# Patient Record
Sex: MALE | Race: BLACK OR AFRICAN AMERICAN | Employment: OTHER | URBAN - METROPOLITAN AREA
[De-identification: names, ages, dates, MRNs, and addresses within clinical notes are randomized per-mention and may not be internally consistent; named-entity substitution may affect disease eponyms.]

---

## 2018-04-04 ENCOUNTER — APPOINTMENT (OUTPATIENT)
Dept: CT IMAGING | Age: 63
End: 2018-04-04
Attending: EMERGENCY MEDICINE
Payer: COMMERCIAL

## 2018-04-04 ENCOUNTER — HOSPITAL ENCOUNTER (EMERGENCY)
Age: 63
Discharge: HOME | End: 2018-04-04
Attending: EMERGENCY MEDICINE
Payer: COMMERCIAL

## 2018-04-04 ENCOUNTER — APPOINTMENT (OUTPATIENT)
Dept: ULTRASOUND IMAGING | Age: 63
End: 2018-04-04
Attending: EMERGENCY MEDICINE
Payer: COMMERCIAL

## 2018-04-04 VITALS
WEIGHT: 280 LBS | RESPIRATION RATE: 18 BRPM | HEART RATE: 86 BPM | DIASTOLIC BLOOD PRESSURE: 92 MMHG | OXYGEN SATURATION: 97 % | BODY MASS INDEX: 34.1 KG/M2 | HEIGHT: 76 IN | SYSTOLIC BLOOD PRESSURE: 184 MMHG | TEMPERATURE: 99 F

## 2018-04-04 DIAGNOSIS — R10.11 ABDOMINAL PAIN, RIGHT UPPER QUADRANT: Primary | ICD-10-CM

## 2018-04-04 LAB
ALBUMIN SERPL-MCNC: 3.2 G/DL (ref 3.4–5)
ALBUMIN/GLOB SERPL: 0.8 {RATIO} (ref 0.8–1.7)
ALP SERPL-CCNC: 47 U/L (ref 45–117)
ALT SERPL-CCNC: 35 U/L (ref 16–61)
ANION GAP SERPL CALC-SCNC: 8 MMOL/L (ref 3–18)
AST SERPL-CCNC: 22 U/L (ref 15–37)
ATRIAL RATE: 84 BPM
BASOPHILS # BLD: 0 K/UL (ref 0–0.1)
BASOPHILS NFR BLD: 0 % (ref 0–2)
BILIRUB SERPL-MCNC: 0.3 MG/DL (ref 0.2–1)
BUN SERPL-MCNC: 10 MG/DL (ref 7–18)
BUN/CREAT SERPL: 10 (ref 12–20)
CALCIUM SERPL-MCNC: 9.5 MG/DL (ref 8.5–10.1)
CALCULATED P AXIS, ECG09: 47 DEGREES
CALCULATED R AXIS, ECG10: 26 DEGREES
CALCULATED T AXIS, ECG11: 70 DEGREES
CHLORIDE SERPL-SCNC: 101 MMOL/L (ref 100–108)
CK MB CFR SERPL CALC: 1.2 % (ref 0–4)
CK MB SERPL-MCNC: 4.7 NG/ML (ref 5–25)
CK SERPL-CCNC: 382 U/L (ref 39–308)
CO2 SERPL-SCNC: 29 MMOL/L (ref 21–32)
CREAT SERPL-MCNC: 0.97 MG/DL (ref 0.6–1.3)
DIAGNOSIS, 93000: NORMAL
DIFFERENTIAL METHOD BLD: ABNORMAL
EOSINOPHIL # BLD: 0.1 K/UL (ref 0–0.4)
EOSINOPHIL NFR BLD: 2 % (ref 0–5)
ERYTHROCYTE [DISTWIDTH] IN BLOOD BY AUTOMATED COUNT: 12.6 % (ref 11.6–14.5)
GLOBULIN SER CALC-MCNC: 3.8 G/DL (ref 2–4)
GLUCOSE SERPL-MCNC: 134 MG/DL (ref 74–99)
HCT VFR BLD AUTO: 35.1 % (ref 36–48)
HGB BLD-MCNC: 11.3 G/DL (ref 13–16)
LACTATE BLD-SCNC: 1.6 MMOL/L (ref 0.4–2)
LIPASE SERPL-CCNC: 388 U/L (ref 73–393)
LYMPHOCYTES # BLD: 1.8 K/UL (ref 0.9–3.6)
LYMPHOCYTES NFR BLD: 24 % (ref 21–52)
MCH RBC QN AUTO: 24.2 PG (ref 24–34)
MCHC RBC AUTO-ENTMCNC: 32.2 G/DL (ref 31–37)
MCV RBC AUTO: 75.2 FL (ref 74–97)
MONOCYTES # BLD: 0.5 K/UL (ref 0.05–1.2)
MONOCYTES NFR BLD: 6 % (ref 3–10)
NEUTS SEG # BLD: 5.3 K/UL (ref 1.8–8)
NEUTS SEG NFR BLD: 68 % (ref 40–73)
P-R INTERVAL, ECG05: 164 MS
PLATELET # BLD AUTO: 265 K/UL (ref 135–420)
PMV BLD AUTO: 11.2 FL (ref 9.2–11.8)
POTASSIUM SERPL-SCNC: 3.6 MMOL/L (ref 3.5–5.5)
PROT SERPL-MCNC: 7 G/DL (ref 6.4–8.2)
Q-T INTERVAL, ECG07: 390 MS
QRS DURATION, ECG06: 92 MS
QTC CALCULATION (BEZET), ECG08: 460 MS
RBC # BLD AUTO: 4.67 M/UL (ref 4.7–5.5)
SODIUM SERPL-SCNC: 138 MMOL/L (ref 136–145)
TROPONIN I SERPL-MCNC: <0.02 NG/ML (ref 0–0.04)
VENTRICULAR RATE, ECG03: 84 BPM
WBC # BLD AUTO: 7.7 K/UL (ref 4.6–13.2)

## 2018-04-04 PROCEDURE — 83605 ASSAY OF LACTIC ACID: CPT

## 2018-04-04 PROCEDURE — 99283 EMERGENCY DEPT VISIT LOW MDM: CPT

## 2018-04-04 PROCEDURE — 93005 ELECTROCARDIOGRAM TRACING: CPT

## 2018-04-04 PROCEDURE — 83690 ASSAY OF LIPASE: CPT | Performed by: EMERGENCY MEDICINE

## 2018-04-04 PROCEDURE — 74177 CT ABD & PELVIS W/CONTRAST: CPT

## 2018-04-04 PROCEDURE — 96374 THER/PROPH/DIAG INJ IV PUSH: CPT

## 2018-04-04 PROCEDURE — 74011250636 HC RX REV CODE- 250/636: Performed by: EMERGENCY MEDICINE

## 2018-04-04 PROCEDURE — 85025 COMPLETE CBC W/AUTO DIFF WBC: CPT | Performed by: EMERGENCY MEDICINE

## 2018-04-04 PROCEDURE — 82550 ASSAY OF CK (CPK): CPT | Performed by: EMERGENCY MEDICINE

## 2018-04-04 PROCEDURE — 76705 ECHO EXAM OF ABDOMEN: CPT

## 2018-04-04 PROCEDURE — 74011636320 HC RX REV CODE- 636/320: Performed by: EMERGENCY MEDICINE

## 2018-04-04 PROCEDURE — 80053 COMPREHEN METABOLIC PANEL: CPT | Performed by: EMERGENCY MEDICINE

## 2018-04-04 RX ORDER — FENTANYL CITRATE 50 UG/ML
50 INJECTION, SOLUTION INTRAMUSCULAR; INTRAVENOUS
Status: COMPLETED | OUTPATIENT
Start: 2018-04-04 | End: 2018-04-04

## 2018-04-04 RX ORDER — OXYCODONE AND ACETAMINOPHEN 5; 325 MG/1; MG/1
1 TABLET ORAL
Qty: 10 TAB | Refills: 0 | Status: SHIPPED | OUTPATIENT
Start: 2018-04-04 | End: 2018-05-11 | Stop reason: ALTCHOICE

## 2018-04-04 RX ADMIN — FENTANYL CITRATE 50 MCG: 50 INJECTION INTRAMUSCULAR; INTRAVENOUS at 14:54

## 2018-04-04 RX ADMIN — IOPAMIDOL 97 ML: 612 INJECTION, SOLUTION INTRAVENOUS at 16:46

## 2018-04-04 NOTE — ED NOTES
Patient given d/c papers. He had no questions @ this car. He and wife ambulated out of room to lobby.

## 2018-04-04 NOTE — ED PROVIDER NOTES
EMERGENCY DEPARTMENT HISTORY AND PHYSICAL EXAM    2:37 PM      Date: 4/4/2018  Patient Name: Elizabeth Orlando History of Presenting Illness     No chief complaint on file. History Provided By: Patient    Additional History (Context): Elizabeth Orlando is a 65 y.o. male with diabetes, hypertension and obesity who presents to the ED with a chief complaint of acute abdominal pain for the past 12 hours with no other associated symptoms. Patient rates pain 9/10. Patient states he has a history of pancreatitis and has a slight concern for a pancreatitis flare up at this time. Patient reports his abdominal pain is more localized to the RUQ. Patient also reports his abdominal pain is precipitated with intake of food. Patient does not state any other modifying factors. Patient denies fever or any other symptoms or complaitns. PCP: PROVIDER UNKNOWN    Chief Complaint: Abdominal pain  Duration: 12 Hours  Timing:  Acute  Location: Abdomen   Quality: N/A  Severity: 9 out of 10  Modifying Factors: Abdominal pain precipitated with intake of food. Associated Symptoms: denies any other associated signs or symptoms      Current Facility-Administered Medications   Medication Dose Route Frequency Provider Last Rate Last Dose    fentaNYL citrate (PF) injection 50 mcg  50 mcg IntraVENous NOW Reilly Tanner MD         Current Outpatient Prescriptions   Medication Sig Dispense Refill    HYDROcodone-acetaminophen (NORCO) 5-325 mg per tablet Take 1 Tab by mouth every six (6) hours as needed for Pain for up to 12 doses. Max Daily Amount: 4 Tabs. 12 Tab 0    insulin glargine (LANTUS SOLOSTAR) 100 unit/mL (3 mL) pen 42 units BID SQ 3 Package 3    metFORMIN (GLUCOPHAGE) 1,000 mg tablet TAKE ONE TABLET BY MOUTH TWICE DAILY WITH MEALS 180 Tab 3    valsartan (DIOVAN) 160 mg tablet Take 1 Tab by mouth daily. 90 Tab 3    LEVITRA 20 mg tablet TAKE ONE TABLET BY MOUTH AS NEEDED 10 Tab 4    aspirin 81 mg Tab Take  by mouth. Past History     Past Medical History:  Past Medical History:   Diagnosis Date    DM (diabetes mellitus) ([facility] [address] 3/15/2010    Elevated cholesterol with high triglycerides 3/15/2010    Elevated PSA 3/15/2010    HTN (hypertension), benign 3/15/2010    Obesity 3/15/2010    Prostate cancer ([facility] 5/3/2011       Past Surgical History:  No past surgical history on file. Family History:  No family history on file. Social History:  Social History   Substance Use Topics    Smoking status: Former Smoker     Quit date: 1/1/1995    Smokeless tobacco: Never Used    Alcohol use No       Allergies: Allergies   Allergen Reactions    Byetta [Exenatide] Other (comments)     Pancreatits         Review of Systems     Review of Systems   Constitutional: Negative for fever. Gastrointestinal: Positive for abdominal pain. All other systems reviewed and are negative. Physical Exam     Visit Vitals    BP (!) 184/92 (BP 1 Location: Left arm, BP Patient Position: Sitting)    Pulse 86    Temp 99 °F (37.2 °C)    Resp 18    Ht 6' 4\" (1.93 m)    Wt 127 kg (280 lb)    SpO2 97%    BMI 34.08 kg/m2       Physical Exam   Constitutional: He is oriented to person, place, and time. He appears well-developed. HENT:   Head: Normocephalic and atraumatic. Eyes: EOM are normal. Pupils are equal, round, and reactive to light. Neck: Normal range of motion. Neck supple. Cardiovascular: Normal rate, regular rhythm and normal heart sounds. Exam reveals no friction rub. No murmur heard. Pulmonary/Chest: Effort normal and breath sounds normal. No respiratory distress. He has no wheezes. Abdominal: Soft. He exhibits no distension. There is tenderness. There is no rebound and no guarding. TTP ruq     Musculoskeletal: Normal range of motion. Neurological: He is alert and oriented to person, place, and time. Skin: Skin is warm and dry. Psychiatric: He has a normal mood and affect.  His behavior is normal. Thought content normal.       Diagnostic Study Results   ekg nsr at 84. Nl axis. Nl int. No lisseth/d. No hypertrophy ; no prior. Medical Decision Making     1. One day RUQ tenderness. Similar x 1 that was pancreatitis.  + nasuea no vomiting, is worsened by food; check labs; RUQ US ? Stone if lipase up. Labs wnl; ct noted; d/w pt will tx sx. Diagnosis     No diagnosis found. _______________________________    Attestations:  Scribe Attestation     Juliana Loza acting as a scribe for and in the presence of Adrian Ross MD      April 04, 2018 at 2:39 PM       Provider Attestation:      I personally performed the services described in the documentation, reviewed the documentation, as recorded by the scribe in my presence, and it accurately and completely records my words and actions.  April 04, 2018 at 2:39 PM - Adrian Ross MD    _______________________________

## 2018-04-04 NOTE — DISCHARGE INSTRUCTIONS
Abdominal Pain: Care Instructions  Your Care Instructions    Abdominal pain has many possible causes. Some aren't serious and get better on their own in a few days. Others need more testing and treatment. If your pain continues or gets worse, you need to be rechecked and may need more tests to find out what is wrong. You may need surgery to correct the problem. Don't ignore new symptoms, such as fever, nausea and vomiting, urination problems, pain that gets worse, and dizziness. These may be signs of a more serious problem. Your doctor may have recommended a follow-up visit in the next 8 to 12 hours. If you are not getting better, you may need more tests or treatment. The doctor has checked you carefully, but problems can develop later. If you notice any problems or new symptoms, get medical treatment right away. Follow-up care is a key part of your treatment and safety. Be sure to make and go to all appointments, and call your doctor if you are having problems. It's also a good idea to know your test results and keep a list of the medicines you take. How can you care for yourself at home? · Rest until you feel better. · To prevent dehydration, drink plenty of fluids, enough so that your urine is light yellow or clear like water. Choose water and other caffeine-free clear liquids until you feel better. If you have kidney, heart, or liver disease and have to limit fluids, talk with your doctor before you increase the amount of fluids you drink. · If your stomach is upset, eat mild foods, such as rice, dry toast or crackers, bananas, and applesauce. Try eating several small meals instead of two or three large ones. · Wait until 48 hours after all symptoms have gone away before you have spicy foods, alcohol, and drinks that contain caffeine. · Do not eat foods that are high in fat. · Avoid anti-inflammatory medicines such as aspirin, ibuprofen (Advil, Motrin), and naproxen (Aleve).  These can cause stomach upset. Talk to your doctor if you take daily aspirin for another health problem. When should you call for help? Call 911 anytime you think you may need emergency care. For example, call if:  ? · You passed out (lost consciousness). ? · You pass maroon or very bloody stools. ? · You vomit blood or what looks like coffee grounds. ? · You have new, severe belly pain. ?Call your doctor now or seek immediate medical care if:  ? · Your pain gets worse, especially if it becomes focused in one area of your belly. ? · You have a new or higher fever. ? · Your stools are black and look like tar, or they have streaks of blood. ? · You have unexpected vaginal bleeding. ? · You have symptoms of a urinary tract infection. These may include:  ¨ Pain when you urinate. ¨ Urinating more often than usual.  ¨ Blood in your urine. ? · You are dizzy or lightheaded, or you feel like you may faint. ? Watch closely for changes in your health, and be sure to contact your doctor if:  ? · You are not getting better after 1 day (24 hours). Where can you learn more? Go to http://hunter-martita.info/. Enter D433 in the search box to learn more about \"Abdominal Pain: Care Instructions. \"  Current as of: March 20, 2017  Content Version: 11.4  © 9661-7041 [employer]. Care instructions adapted under license by [employer] (which disclaims liability or warranty for this information). If you have questions about a medical condition or this instruction, always ask your healthcare professional. [place] [address] any warranty or liability for your use of this information.

## 2018-04-04 NOTE — ED TRIAGE NOTES
Pt. States \"I'm having severe stomach pains\" reports symptoms started \"3 o'clock this morning\". Denies any N/V/D.

## 2018-04-27 ENCOUNTER — OFFICE VISIT (OUTPATIENT)
Dept: FAMILY MEDICINE CLINIC | Age: 63
End: 2018-04-27

## 2018-04-27 VITALS
BODY MASS INDEX: 35.39 KG/M2 | HEIGHT: 76 IN | DIASTOLIC BLOOD PRESSURE: 88 MMHG | RESPIRATION RATE: 12 BRPM | OXYGEN SATURATION: 98 % | HEART RATE: 85 BPM | SYSTOLIC BLOOD PRESSURE: 148 MMHG | TEMPERATURE: 97.3 F | WEIGHT: 290.6 LBS

## 2018-04-27 DIAGNOSIS — Z53.21 PATIENT LEFT WITHOUT BEING SEEN: Primary | ICD-10-CM

## 2018-04-27 RX ORDER — INSULIN ASPART 100 [IU]/ML
5 INJECTION, SOLUTION INTRAVENOUS; SUBCUTANEOUS 2 TIMES DAILY
COMMUNITY
End: 2018-05-24 | Stop reason: ALTCHOICE

## 2018-04-27 RX ORDER — SILDENAFIL 50 MG/1
50 TABLET, FILM COATED ORAL
COMMUNITY
End: 2022-08-23 | Stop reason: ALTCHOICE

## 2018-04-27 RX ORDER — MELATONIN
2000
COMMUNITY
End: 2018-05-24 | Stop reason: ALTCHOICE

## 2018-04-27 RX ORDER — TRAMADOL HYDROCHLORIDE 50 MG/1
100 TABLET ORAL
COMMUNITY
End: 2018-10-29

## 2018-04-27 NOTE — PROGRESS NOTES
Patient came into the office today to establish care. Patient stated he has been a [facility] patient updated care team and verified pharmacy. Records release signed for the [facility]. 1. Have you been to the ER, urgent care clinic since your last visit? Hospitalized since your last visit? Yes When: 04/04/18 and 04/10/18 and 04/15/18 Where: [facility] [employer]  Reason for visit: Pancreatitis, abdominal pain, Flu virus    2. Have you seen or consulted any other health care providers outside of the [facility] since your last visit? Include any pap smears or colon screening.  No

## 2018-05-11 ENCOUNTER — OFFICE VISIT (OUTPATIENT)
Dept: FAMILY MEDICINE CLINIC | Age: 63
End: 2018-05-11

## 2018-05-11 VITALS
OXYGEN SATURATION: 98 % | DIASTOLIC BLOOD PRESSURE: 86 MMHG | RESPIRATION RATE: 12 BRPM | HEART RATE: 82 BPM | BODY MASS INDEX: 35.22 KG/M2 | HEIGHT: 76 IN | TEMPERATURE: 97.8 F | SYSTOLIC BLOOD PRESSURE: 148 MMHG | WEIGHT: 289.2 LBS

## 2018-05-11 DIAGNOSIS — Z79.4 TYPE 2 DIABETES MELLITUS WITH DIABETIC POLYNEUROPATHY, WITH LONG-TERM CURRENT USE OF INSULIN (HCC): Primary | ICD-10-CM

## 2018-05-11 DIAGNOSIS — E66.9 OBESITY WITH SERIOUS COMORBIDITY, UNSPECIFIED CLASSIFICATION, UNSPECIFIED OBESITY TYPE: ICD-10-CM

## 2018-05-11 DIAGNOSIS — E11.42 TYPE 2 DIABETES MELLITUS WITH DIABETIC POLYNEUROPATHY, WITH LONG-TERM CURRENT USE OF INSULIN (HCC): Primary | ICD-10-CM

## 2018-05-11 DIAGNOSIS — Z51.81 ENCOUNTER FOR MEDICATION MONITORING: ICD-10-CM

## 2018-05-11 DIAGNOSIS — Z87.19 HX OF PANCREATITIS: ICD-10-CM

## 2018-05-11 DIAGNOSIS — I10 ESSENTIAL HYPERTENSION: ICD-10-CM

## 2018-05-11 DIAGNOSIS — E55.9 VITAMIN D DEFICIENCY: ICD-10-CM

## 2018-05-11 PROBLEM — Z99.89 OSA ON CPAP: Status: ACTIVE | Noted: 2018-05-11

## 2018-05-11 PROBLEM — G47.33 OSA ON CPAP: Status: ACTIVE | Noted: 2018-05-11

## 2018-05-11 RX ORDER — VALSARTAN 80 MG/1
80 TABLET ORAL DAILY
Qty: 90 TAB | Refills: 4 | Status: SHIPPED | OUTPATIENT
Start: 2018-05-11 | End: 2018-11-16

## 2018-05-11 RX ORDER — VALSARTAN 160 MG/1
40 TABLET ORAL 2 TIMES DAILY
Status: CANCELLED | OUTPATIENT
Start: 2018-05-11

## 2018-05-11 NOTE — PROGRESS NOTES
Chelsey Kumar is a 65 y.o. male  New to me, fractured care in recent years, largely through the [place]    Admitted [facility] x5 days for pancreatitis. Discharged 4/10/2018. No clear etiology. Feels well    Pancreatitis 3 years ago attributed to Byetta, which had recently been initiated. DM2: with neuropathy  No retinopathy  Foot exam 1 month ago at the [facility] for some sort of hemorrhage post discharge not related to DM2. Short acting insulin had been 10 units twice daily before hospitalization  Lantus had been 48 units daily      Home glucoses:  Fastin-157  PP lunch: 200-210, only checks when sluggish/tired - once q few weeks  PP dinner: NA  Hypoglycemic episodes: perhaps 1.5 months ago with higher short acting insulin    Hypertension: Valsartan halved during hospitalization to 80 mg daily, ran out last week      Had been on atorvastatin at one point \"I didn't like the way it made my body feel. \"    history of vit d def used to be on high dose replacement    Prostate cancer  with seed implant. Lost to follow up. Patient Care Team:  Wali Jefferson MD as PCP - General (Family Practice)  Krystal Vigil NP as Physician (Nurse Practitioner)  Allergies   Allergen Reactions    Byetta [Exenatide] Other (comments)     Pancreatits     Outpatient Prescriptions Marked as Taking for the 18 encounter (Office Visit) with Wali Jefferson MD   Medication Sig Dispense Refill    cholecalciferol (VITAMIN D3) 1,000 unit tablet 2,000 Units.  insulin aspart U-100 (NOVOLOG) 100 unit/mL injection 5 Units by SubCUTAneous route two (2) times a day.  sildenafil citrate (VIAGRA) 50 mg tablet 50 mg.      insulin glargine (LANTUS SOLOSTAR) 100 unit/mL (3 mL) pen 42 units BID SQ (Patient taking differently: 22 Units.  20 units BID SQ) 3 Package 3    metFORMIN (GLUCOPHAGE) 1,000 mg tablet TAKE ONE TABLET BY MOUTH TWICE DAILY WITH MEALS 180 Tab 3    valsartan (DIOVAN) 160 mg tablet Take 1 Tab by mouth daily. (Patient taking differently: Take 40 mg by mouth two (2) times a day.) 90 Tab 3    LEVITRA 20 mg tablet TAKE ONE TABLET BY MOUTH AS NEEDED 10 Tab 4    aspirin 81 mg Tab Take  by mouth. Patient Active Problem List    Diagnosis    Microalbuminuria    Prostate cancer ([facility] [address]    DM (diabetes mellitus) ([facility] [address]    Essential hypertension    Elevated cholesterol with high triglycerides    Obesity    Elevated PSA     Past Medical History:   Diagnosis Date    DM (diabetes mellitus) ([facility] [address] 3/15/2010    Elevated cholesterol with high triglycerides 3/15/2010    Elevated PSA 3/15/2010    HTN (hypertension), benign 3/15/2010    Obesity 3/15/2010    Prostate cancer ([facility] [address] 5/3/2011     No past surgical history on file. No family history on file. Social History     Social History    Marital status:      Spouse name: N/A    Number of children: N/A    Years of education: N/A     Occupational History    [address]     law enforcement [employer]     Social History Main Topics    Smoking status: Former Smoker     Quit date: 1/1/1995    Smokeless tobacco: Never Used    Alcohol use No    Drug use: No    Sexual activity: Yes     Partners: Female     Other Topics Concern    Not on file     Social History Narrative            Review of Systems   Constitutional: Negative for fever and malaise/fatigue. Respiratory: Negative for cough, hemoptysis and shortness of breath. Cardiovascular: Negative for chest pain and leg swelling. Gastrointestinal: Negative for abdominal pain, blood in stool, melena, nausea and vomiting. Genitourinary: Negative for hematuria. Neurological: Negative for sensory change, speech change, focal weakness and headaches.        Visit Vitals    /86 (BP 1 Location: Left arm, BP Patient Position: Sitting)    Pulse 82    Temp 97.8 °F (36.6 °C) (Oral)    Resp 12    Ht 6' 4\" (1.93 m)    Wt 289 lb 3.2 oz (131.2 kg)    SpO2 98%    BMI 35.2 kg/m2      Physical Exam   Constitutional: He is oriented to person, place, and time. He appears well-developed and well-nourished. No distress. Pleasant obese  Male   HENT:   Head: Normocephalic and atraumatic. Right Ear: External ear normal.   Left Ear: External ear normal.   Mouth/Throat: Oropharynx is clear and moist.   Eyes: Conjunctivae are normal. No scleral icterus. Neck: Normal range of motion. Neck supple. No thyromegaly present. Cardiovascular: Normal rate, regular rhythm, normal heart sounds and intact distal pulses. Exam reveals no gallop and no friction rub. No murmur heard. Pulmonary/Chest: Effort normal and breath sounds normal. No respiratory distress. He has no wheezes. He has no rales. Abdominal: Soft. Bowel sounds are normal. He exhibits no distension and no mass. There is no hepatosplenomegaly. There is no tenderness. Musculoskeletal: Normal range of motion. He exhibits no edema. Lymphadenopathy:     He has no cervical adenopathy. Neurological: He is alert and oriented to person, place, and time. Skin: Skin is warm and dry. Psychiatric: He has a normal mood and affect. His behavior is normal. Judgment and thought content normal.   Nursing note and vitals reviewed. Lab Results   Component Value Date/Time    Sodium 138 04/04/2018 02:56 PM    Potassium 3.6 04/04/2018 02:56 PM    Chloride 101 04/04/2018 02:56 PM    CO2 29 04/04/2018 02:56 PM    Anion gap 8 04/04/2018 02:56 PM    Glucose 134 (H) 04/04/2018 02:56 PM    BUN 10 04/04/2018 02:56 PM    Creatinine 0.97 04/04/2018 02:56 PM    BUN/Creatinine ratio 10 (L) 04/04/2018 02:56 PM    GFR est AA >60 04/04/2018 02:56 PM    GFR est non-AA >60 04/04/2018 02:56 PM    Calcium 9.5 04/04/2018 02:56 PM    Bilirubin, total 0.3 04/04/2018 02:56 PM    AST (SGOT) 22 04/04/2018 02:56 PM    Alk.  phosphatase 47 04/04/2018 02:56 PM    Protein, total 7.0 04/04/2018 02:56 PM    Albumin 3.2 (L) 04/04/2018 02:56 PM    Globulin 3.8 04/04/2018 02:56 PM    A-G Ratio 0.8 04/04/2018 02:56 PM    ALT (SGPT) 35 04/04/2018 02:56 PM       Lab Results   Component Value Date/Time    Hemoglobin A1c 11.0 (H) 04/26/2012 11:01 AM    Hemoglobin A1c 7.0 (H) 09/14/2011 08:22 AM    Hemoglobin A1c 8.6 (H) 05/03/2011 03:24 PM    Hemoglobin A1c 11.1 (H) 02/17/2011 08:46 AM    Glucose 134 (H) 04/04/2018 02:56 PM    Glucose (POC) 293 (H) 09/07/2010 08:32 AM    Glucose,  (H) 03/04/2010 10:23 AM    Microalb/Creat ratio (ug/mg creat.) 356.9 (H) 07/02/2013 09:30 AM    LDL, calculated Comment 01/02/2014 09:02 AM    Creatinine 0.97 04/04/2018 02:56 PM           ICD-10-CM ICD-9-CM    1. Type 2 diabetes mellitus with diabetic polyneuropathy, with long-term current use of insulin (HCC) E11.42 250.60 HEMOGLOBIN A1C WITH EAG    Z79.4 357.2 LIPID PANEL W/ REFLX DIRECT LDL     V58.67 MICROALBUMIN, UR, RAND W/ MICROALB/CREAT RATIO      MICROALBUMIN, UR, RAND W/ MICROALB/CREAT RATIO      LIPID PANEL W/ REFLX DIRECT LDL      HEMOGLOBIN A1C WITH EAG   2. Obesity with serious comorbidity, unspecified classification, unspecified obesity type E66.9 278.00    3. Encounter for medication monitoring Z51.81 V58.83 MAGNESIUM      VITAMIN B12 & FOLATE      VITAMIN B12 & FOLATE      MAGNESIUM   4. Hx of pancreatitis Z87.19 V12.79    5. Essential hypertension I10 401.9 valsartan (DIOVAN) 80 mg tablet   6. Vitamin D deficiency E55.9 268.9 VITAMIN D, 25 HYDROXY      VITAMIN D, 25 HYDROXY     DM 2: Obtaining records. Labs today. Continue current medications unchanged  History of pancreatitis x 2: Clinically resolved. Obtaining records from discharge summary specifically looking for GI consultation notes/plan    Reports prior statin intolerance. Anticipating finding of uncontrolled vitamin D deficiency. We will replace and then retrial statin    Hypertension: Uncontrolled with labs medication this past week. Resuming lower dose valsartan.     Unless otherwise stated, conditions above are stable or improved and well controlled, with a plan to continue present management. The patient understands our medical plan. Alternatives have been explained and offered. The risks, benefits and significant side effects of all medications have been reviewed. Anticipated time course and progression of condition reviewed. All questions have been addressed. He is encouraged to employ the information provided in the after visit summary, which was reviewed. Follow-up Disposition:  Return in about 2 weeks (around 5/25/2018) for diabetes follow up, discharge summary review, (30). Dragon medical dictation software was used for portions of this report. Unintended voice recognition errors may occur.

## 2018-05-11 NOTE — PROGRESS NOTES
Chief Complaint   Patient presents with    Establish Care       Pt preferred language for health care discussion is english. Is someone accompanying this pt? no    Is the patient using any DME equipment during OV? no    Depression Screening:  PHQ over the last two weeks 5/11/2018 4/27/2018   Little interest or pleasure in doing things Not at all Not at all   Feeling down, depressed or hopeless Not at all Not at all   Total Score PHQ 2 0 0       Learning Assessment:  Learning Assessment 5/11/2018 1/2/2014   PRIMARY LEARNER Patient Patient   HIGHEST LEVEL OF EDUCATION - PRIMARY LEARNER  > 4 YEARS OF COLLEGE 4 YEARS OF COLLEGE   BARRIERS PRIMARY LEARNER NONE NONE   CO-LEARNER CAREGIVER - No   PRIMARY LANGUAGE ENGLISH ENGLISH   LEARNER PREFERENCE PRIMARY LISTENING OTHER (COMMENT)   ANSWERED BY patient  patient   RELATIONSHIP SELF SELF         Health Maintenance reviewed and discussed per provider. Yes    Pt is due for   Health Maintenance Due   Topic Date Due    Hepatitis C Screening  1955    FOOT EXAM Q1  11/25/1966    Pneumococcal 19-64 Highest Risk (1 of 3 - PCV13) 11/25/1975    DTaP/Tdap/Td series (1 - Tdap) 11/25/1977    FOBT Q 1 YEAR AGE 50-75  11/25/2006    EYE EXAM RETINAL OR DILATED Q1  12/02/2013    HEMOGLOBIN A1C Q6M  07/02/2014    MICROALBUMIN Q1  07/02/2014    LIPID PANEL Q1  01/02/2015    ZOSTER VACCINE AGE 60>  09/25/2016   . Please order/place referral if appropriate. Advance Directive:  1. Do you have an advance directive in place? Patient Reply:no    2. If not, would you like material regarding how to put one in place? Patient Reply: no        Coordination of Care:  1. Have you been to the ER, urgent care clinic since your last visit? Hospitalized since your last visit? Hospitalized Yes When: 04/04/18 and 04/10/18 and 04/15/18 Where: [facility]  Reason for visit: Pancreatitis, abdominal pain, Flu virus    2.  Have you seen or consulted any other health care providers outside of the [address] [place] [address] since your last visit? Include any pap smears or colon screening. no    Pharmacy verified  Care Team verified and updated. Please see Red banners under Allergies, Med rec, Immunizations to remove outside inquires. All correct information has been verified with patient and added to chart.

## 2018-05-11 NOTE — MR AVS SNAPSHOT
[facility] [address] [address] [place] [address] 
[phone] Patient: Jai Garay. MRN: [id number] :1955 Visit Information Date & Time Provider Department Dept. Phone Encounter #  
 2018  2:00 PM Alyson Lee MD [facility] [id number] Follow-up Instructions Return in about 2 weeks (around 2018) for diabetes follow up, discharge summary review, (30). Upcoming Health Maintenance Date Due Hepatitis C Screening 1955 FOOT EXAM Q1 1966 Pneumococcal 19-64 Highest Risk (1 of 3 - PCV13) 1975 DTaP/Tdap/Td series (1 - Tdap) 1977 FOBT Q 1 YEAR AGE 50-75 2006 EYE EXAM RETINAL OR DILATED Q1 2013 HEMOGLOBIN A1C Q6M 2014 MICROALBUMIN Q1 2014 LIPID PANEL Q1 2015 ZOSTER VACCINE AGE 60> 2016 Influenza Age 5 to Adult 2018 Allergies as of 2018  Review Complete On: 2018 By: Alyson Lee MD  
  
 Severity Noted Reaction Type Reactions Byetta [Exenatide] High 2014    Other (comments) Pancreatits Current Immunizations  Never Reviewed No immunizations on file. Not reviewed this visit You Were Diagnosed With   
  
 Codes Comments Type 2 diabetes mellitus with diabetic polyneuropathy, with long-term current use of insulin (HCC)    -  Primary ICD-10-CM: E11.42, Z79.4 ICD-9-CM: 250.60, 357.2, V58.67 Obesity with serious comorbidity, unspecified classification, unspecified obesity type     ICD-10-CM: E66.9 ICD-9-CM: 278.00 Encounter for medication monitoring     ICD-10-CM: Z51.81 
ICD-9-CM: V58.83 Hx of pancreatitis     ICD-10-CM: Z87.19 ICD-9-CM: V12.79 Essential hypertension     ICD-10-CM: I10 
ICD-9-CM: 401.9 Vitamin D deficiency     ICD-10-CM: E55.9 ICD-9-CM: 268.9 Vitals BP Pulse Temp Resp Height(growth percentile) Weight(growth percentile) 148/86 (BP 1 Location: Left arm, BP Patient Position: Sitting) 82 97.8 °F (36.6 °C) (Oral) 12 6' 4\" (1.93 m) 289 lb 3.2 oz (131.2 kg) SpO2 BMI Smoking Status 98% 35.2 kg/m2 Former Smoker BMI and BSA Data Body Mass Index Body Surface Area  
 35.2 kg/m 2 2.65 m 2 Preferred Pharmacy Pharmacy Name Phone [address] [place] [address] [phone] Your Updated Medication List  
  
   
This list is accurate as of 5/11/18  2:58 PM.  Always use your most recent med list.  
  
  
  
  
 aspirin 81 mg tablet Take  by mouth. cholecalciferol 1,000 unit tablet Commonly known as:  VITAMIN D3  
2,000 Units. insulin aspart U-100 100 unit/mL injection Commonly known as:  NOVOLOG  
5 Units by SubCUTAneous route two (2) times a day. insulin glargine 100 unit/mL (3 mL) Inpn Commonly known as:  LANTUS SOLOSTAR U-100 INSULIN  
42 units BID SQ  
  
 LEVITRA 20 mg tablet Generic drug:  vardenafil TAKE ONE TABLET BY MOUTH AS NEEDED  
  
 metFORMIN 1,000 mg tablet Commonly known as:  GLUCOPHAGE  
TAKE ONE TABLET BY MOUTH TWICE DAILY WITH MEALS  
  
 sildenafil citrate 50 mg tablet Commonly known as:  VIAGRA 50 mg.  
  
 traMADol 50 mg tablet Commonly known as:  ULTRAM  
100 mg.  
  
 valsartan 80 mg tablet Commonly known as:  DIOVAN Take 1 Tab by mouth daily. Prescriptions Sent to Pharmacy Refills  
 valsartan (DIOVAN) 80 mg tablet 4 Sig: Take 1 Tab by mouth daily. Class: Normal  
 Pharmacy: [address] [place] [address] Ph #: [phone] Route: Oral  
  
Follow-up Instructions Return in about 2 weeks (around 5/25/2018) for diabetes follow up, discharge summary review, (30). To-Do List   
 05/11/2018 Lab:  HEMOGLOBIN A1C WITH EAG   
  
 05/11/2018 Lab: LIPID PANEL W/ REFLX DIRECT LDL   
  
 05/11/2018 Lab:  MAGNESIUM   
  
 05/11/2018 Lab:  MICROALBUMIN, UR, RAND W/ MICROALB/CREAT RATIO Around 05/11/2018 Lab:  VITAMIN B12 & FOLATE   
  
 05/11/2018 Lab:  VITAMIN D, 25 HYDROXY Patient Instructions Please call the clinic if you haven't received a call with the results of your tests or with an appointment plan for any referrals/studies within one week. No news is not good news; it's no news. Please review the list of your current medications (name, formulation, strength and dosing instructions) and allergies and call us if there is an error. It is good practice to bring all of your prescriptions, over the counter medications and herbal supplements to each visit. Learn what conditions your medications are treating. Know which doctor is responsible for managing which condition. If you are needing of more medication, contact the office of the doctor managing that condition. It is my practice to ensure that any prescription generated has a large enough supply with enough refills to last you through and beyond the time I am next expecting to see you. If your medications are running low, contact your pharmacy for a refill. If there are no refills remaining, that means it is time for you to see me to reassess the status of your condition and whether the medication is still appropriate. Introducing [facility] & HEALTH SERVICES! [facility] introduces [employer] patient portal. Now you can access parts of your medical record, email your doctor's office, and request medication refills online. 1. In your internet browser, go to [email or web address] 2. Click on the First Time User? Click Here link in the Sign In box. You will see the New Member Sign Up page. 3. Enter your [employer] Access Code exactly as it appears below.  You will not need to use this code after youve completed the sign-up process. If you do not sign up before the expiration date, you must request a new code. · [employer] Access Code: [email or web address] Expires: 7/3/2018  1:51 PM 
 
4. Enter the last four digits of your Social Security Number (xxxx) and Date of Birth (mm/dd/yyyy) as indicated and click Submit. You will be taken to the next sign-up page. 5. Create a [employer] ID. This will be your [employer] login ID and cannot be changed, so think of one that is secure and easy to remember. 6. Create a [employer] password. You can change your password at any time. 7. Enter your Password Reset Question and Answer. This can be used at a later time if you forget your password. 8. Enter your e-mail address. You will receive e-mail notification when new information is available in [address] 9. Click Sign Up. You can now view and download portions of your medical record. 10. Click the Download Summary menu link to download a portable copy of your medical information. If you have questions, please visit the Frequently Asked Questions section of the [employer] website. Remember, [employer] is NOT to be used for urgent needs. For medical emergencies, dial 911. Now available from your iPhone and Android! Please provide this summary of care documentation to your next provider. Your primary care clinician is listed as Cait Giles. If you have any questions after today's visit, please call [phone].

## 2018-05-11 NOTE — Clinical Note
Raul Bauer recommend setting a tickler to request [facility] again in a week if you haven't seen them at that point.

## 2018-05-12 LAB
25(OH)D3 SERPL-MCNC: 19.6 NG/ML (ref 32–100)
AVG GLU, 10930: 177 MG/DL (ref 91–123)
CHOLEST SERPL-MCNC: 229 MG/DL (ref 110–200)
CREATININE, URINE: 85 MG/DL
FOLATE,FOL: 7.45 NG/ML
HBA1C MFR BLD HPLC: 7.8 % (ref 4.8–5.9)
HDLC SERPL-MCNC: 26 MG/DL (ref 40–59)
HDLC SERPL-MCNC: 8.8 MG/DL (ref 0–5)
LDLC SERPL CALC-MCNC: 125 MG/DL (ref 50–99)
MAGNESIUM SERPL-MCNC: 1.3 MG/DL (ref 1.6–2.5)
MICROALB/CREAT RATIO, 140286: 1193.2 MCG/MG OF CREATININE (ref 0–30)
MICROALBUMIN,URINE RANDOM 140054: 1014.2 UG/ML (ref 0.1–17)
TRIGL SERPL-MCNC: 388 MG/DL (ref 40–149)
VIT B12 SERPL-MCNC: 502 PG/ML (ref 211–911)
VLDLC SERPL CALC-MCNC: 78 MG/DL (ref 8–30)

## 2018-05-24 ENCOUNTER — OFFICE VISIT (OUTPATIENT)
Dept: FAMILY MEDICINE CLINIC | Age: 63
End: 2018-05-24

## 2018-05-24 VITALS
DIASTOLIC BLOOD PRESSURE: 90 MMHG | HEIGHT: 76 IN | WEIGHT: 288.8 LBS | HEART RATE: 90 BPM | RESPIRATION RATE: 16 BRPM | SYSTOLIC BLOOD PRESSURE: 176 MMHG | BODY MASS INDEX: 35.17 KG/M2 | OXYGEN SATURATION: 94 % | TEMPERATURE: 98.6 F

## 2018-05-24 DIAGNOSIS — E66.01 SEVERE OBESITY (BMI 35.0-39.9) WITH COMORBIDITY (HCC): ICD-10-CM

## 2018-05-24 DIAGNOSIS — E55.9 VITAMIN D DEFICIENCY: ICD-10-CM

## 2018-05-24 DIAGNOSIS — Z79.4 TYPE 2 DIABETES MELLITUS WITH DIABETIC POLYNEUROPATHY, WITH LONG-TERM CURRENT USE OF INSULIN (HCC): Primary | ICD-10-CM

## 2018-05-24 DIAGNOSIS — K76.0 NAFLD (NONALCOHOLIC FATTY LIVER DISEASE): ICD-10-CM

## 2018-05-24 DIAGNOSIS — G47.33 OSA ON CPAP: ICD-10-CM

## 2018-05-24 DIAGNOSIS — E11.21 TYPE 2 DIABETES WITH NEPHROPATHY (HCC): ICD-10-CM

## 2018-05-24 DIAGNOSIS — E11.42 TYPE 2 DIABETES MELLITUS WITH DIABETIC POLYNEUROPATHY, WITH LONG-TERM CURRENT USE OF INSULIN (HCC): Primary | ICD-10-CM

## 2018-05-24 DIAGNOSIS — Z87.19 HISTORY OF PANCREATITIS: ICD-10-CM

## 2018-05-24 DIAGNOSIS — Z99.89 OSA ON CPAP: ICD-10-CM

## 2018-05-24 DIAGNOSIS — D64.9 ANEMIA, UNSPECIFIED TYPE: ICD-10-CM

## 2018-05-24 DIAGNOSIS — I10 ESSENTIAL HYPERTENSION: ICD-10-CM

## 2018-05-24 RX ORDER — AMLODIPINE BESYLATE 2.5 MG/1
2.5 TABLET ORAL DAILY
Qty: 30 TAB | Refills: 2 | Status: SHIPPED | OUTPATIENT
Start: 2018-05-24 | End: 2018-09-28 | Stop reason: SDUPTHER

## 2018-05-24 RX ORDER — INSULIN GLARGINE 100 [IU]/ML
15 INJECTION, SOLUTION SUBCUTANEOUS DAILY
Qty: 5 PEN | Refills: 0
Start: 2018-05-24 | End: 2018-11-16

## 2018-05-24 RX ORDER — ERGOCALCIFEROL 1.25 MG/1
50000 CAPSULE ORAL
Qty: 20 CAP | Refills: 1 | Status: SHIPPED | OUTPATIENT
Start: 2018-05-24 | End: 2018-10-09 | Stop reason: SDUPTHER

## 2018-05-24 NOTE — MR AVS SNAPSHOT
[facility] [address] [place] [address] 
[phone] Patient: Ildefonso Amador MRN: [id number] :1955 Visit Information Date & Time Provider Department Dept. Phone Encounter #  
 2018  2:00 PM Donnell Pérez MD [facility] [phone] [id number] Follow-up Instructions Return in about 1 month (around 2018) for blood pressure follow up, no labs prior. Upcoming Health Maintenance Date Due Hepatitis C Screening 1955 FOOT EXAM Q1 1966 Pneumococcal 19-64 Highest Risk (1 of 3 - PCV13) 1975 DTaP/Tdap/Td series (1 - Tdap) 1977 FOBT Q 1 YEAR AGE 50-75 2006 EYE EXAM RETINAL OR DILATED Q1 2013 ZOSTER VACCINE AGE 60> 2016 Influenza Age 5 to Adult 2018 HEMOGLOBIN A1C Q6M 2018 MICROALBUMIN Q1 2019 LIPID PANEL Q1 2019 Allergies as of 2018  Review Complete On: 2018 By: Donnell Pérez MD  
  
 Severity Noted Reaction Type Reactions Byetta [Exenatide] High 2014    Other (comments) Pancreatits Current Immunizations  Never Reviewed No immunizations on file. Not reviewed this visit You Were Diagnosed With   
  
 Codes Comments NAFLD (nonalcoholic fatty liver disease)    -  Primary ICD-10-CM: K76.0 ICD-9-CM: 571.8 CHELE on CPAP     ICD-10-CM: G47.33, Z99.89 ICD-9-CM: 327.23, V46.8 Essential hypertension     ICD-10-CM: I10 
ICD-9-CM: 401.9 History of pancreatitis     ICD-10-CM: Z87.19 ICD-9-CM: V12.79 Type 2 diabetes mellitus with diabetic polyneuropathy, with long-term current use of insulin (HCC)     ICD-10-CM: E11.42, Z79.4 ICD-9-CM: 250.60, 357.2, V58.67 Type 2 diabetes with nephropathy (HCC)     ICD-10-CM: E11.21 
ICD-9-CM: 250.40, 583.81 Vitamin D deficiency     ICD-10-CM: E55.9 ICD-9-CM: 268.9 Anemia, unspecified type     ICD-10-CM: D64.9 ICD-9-CM: 349. 9 Vitals BP Pulse Temp Resp Height(growth percentile) Weight(growth percentile) 176/90 90 98.6 °F (37 °C) (Oral) 16 6' 4\" (1.93 m) 288 lb 12.8 oz (131 kg) SpO2 BMI Smoking Status 94% 35.15 kg/m2 Former Smoker BMI and BSA Data Body Mass Index Body Surface Area  
 35.15 kg/m 2 2.65 m 2 Preferred Pharmacy Pharmacy Name Phone [address] [place] [address] [phone] Your Updated Medication List  
  
   
This list is accurate as of 18  3:27 PM.  Always use your most recent med list. amLODIPine 2.5 mg tablet Commonly known as:  Oumou Hadley Take 1 Tab by mouth daily. aspirin 81 mg tablet Take  by mouth. dulaglutide 0.75 mg/0.5 mL sub-q pen Commonly known as:  TRULICITY  
0.5 mL by SubCUTAneous route every seven (7) days. ergocalciferol 50,000 unit capsule Commonly known as:  VITAMIN D2 Take 1 Cap by mouth every seven (7) days. Start with 1 cap daily for 3 days  
  
 insulin glargine 100 unit/mL (3 mL) Inpn Commonly known as:  LANTUS SOLOSTAR U-100 INSULIN  
15 Units by SubCUTAneous route daily. metFORMIN 1,000 mg tablet Commonly known as:  GLUCOPHAGE  
TAKE ONE TABLET BY MOUTH TWICE DAILY WITH MEALS  
  
 sildenafil citrate 50 mg tablet Commonly known as:  VIAGRA 50 mg.  
  
 traMADol 50 mg tablet Commonly known as:  ULTRAM  
100 mg.  
  
 valsartan 80 mg tablet Commonly known as:  DIOVAN Take 1 Tab by mouth daily. Prescriptions Sent to Pharmacy Refills  
 dulaglutide (TRULICITY) 4.50 YQ/1.7 mL sub-q pen 6 Si.5 mL by SubCUTAneous route every seven (7) days. Class: Normal  
 Pharmacy: [address] [place] [address] Ph #: [phone] Route: SubCUTAneous  
 ergocalciferol (VITAMIN D2) 50,000 unit capsule 1 Sig: Take 1 Cap by mouth every seven (7) days. Start with 1 cap daily for 3 days Class: Normal  
 Pharmacy: [facility] [address] [place] [address] Ph #: [phone] Route: Oral  
 amLODIPine (NORVASC) 2.5 mg tablet 2 Sig: Take 1 Tab by mouth daily. Class: Normal  
 Pharmacy: [facility] [address] [place] [address] Ph #: [phone] Route: Oral  
  
Follow-up Instructions Return in about 1 month (around 6/24/2018) for blood pressure follow up, no labs prior. To-Do List   
 08/22/2018 Lab:  IRON PROFILE   
  
 08/23/2018 Lab:  FERRITIN   
  
 08/23/2018 Lab:  MICROALBUMIN, UR, RAND W/ MICROALB/CREAT RATIO   
  
 08/23/2018 Lab:  VITAMIN D, 25 HYDROXY   
  
 08/24/2018 Lab:  AMYLASE ISOENZYMES   
  
 08/24/2018 Lab:  CBC WITH AUTOMATED DIFF   
  
 08/24/2018 Lab:  HEMOGLOBIN A1C WITH EAG   
  
 08/24/2018 Lab:  LIPASE Around 08/24/2018 Lab:  METABOLIC PANEL, COMPREHENSIVE Patient Instructions Expect your fasting sugars to increase temporarily as we start the Trulicity. Introducing [facility]! [employer] introduces [employer] patient portal. Now you can access parts of your medical record, email your doctor's office, and request medication refills online. 1. In your internet browser, go to [email or web address] 2. Click on the First Time User? Click Here link in the Sign In box. You will see the New Member Sign Up page. 3. Enter your [employer] Access Code exactly as it appears below. You will not need to use this code after youve completed the sign-up process. If you do not sign up before the expiration date, you must request a new code. · [employer] Access Code: [email or web address] Expires: 7/3/2018  1:51 PM 
 
4. Enter the last four digits of your Social Security Number (xxxx) and Date of Birth (mm/dd/yyyy) as indicated and click Submit.  You will be taken to the next sign-up page. 5. Create a [employer] ID. This will be your [employer] login ID and cannot be changed, so think of one that is secure and easy to remember. 6. Create a [employer] password. You can change your password at any time. 7. Enter your Password Reset Question and Answer. This can be used at a later time if you forget your password. 8. Enter your e-mail address. You will receive e-mail notification when new information is available in [address] 9. Click Sign Up. You can now view and download portions of your medical record. 10. Click the Download Summary menu link to download a portable copy of your medical information. If you have questions, please visit the Frequently Asked Questions section of the [employer] website. Remember, [employer] is NOT to be used for urgent needs. For medical emergencies, dial 911. Now available from your iPhone and Android! Please provide this summary of care documentation to your next provider. Your primary care clinician is listed as Samanta Holloway. If you have any questions after today's visit, please call [phone].

## 2018-05-24 NOTE — PROGRESS NOTES
Vance Dumas. is a 65 y.o. male who was seen in clinic today (5/24/2018). Assessment & Plan:       ICD-10-CM ICD-9-CM    1. Type 2 diabetes mellitus with diabetic polyneuropathy, with long-term current use of insulin (HCC) O77.37 638.11 METABOLIC PANEL, COMPREHENSIVE    Z79.4 357.2 dulaglutide (TRULICITY) 6.82 CO/2.0 mL sub-q pen     V58.67 insulin glargine (LANTUS SOLOSTAR U-100 INSULIN) 100 unit/mL (3 mL) inpn      HEMOGLOBIN A1C WITH EAG   2. Type 2 diabetes with nephropathy ([facility]) D24.17 014.99 METABOLIC PANEL, COMPREHENSIVE     583.81 MICROALBUMIN, UR, RAND W/ MICROALB/CREAT RATIO   3. NAFLD (nonalcoholic fatty liver disease) K76.0 571.8 CBC WITH AUTOMATED DIFF      METABOLIC PANEL, COMPREHENSIVE   4. Essential hypertension P56 394.0 METABOLIC PANEL, COMPREHENSIVE      amLODIPine (NORVASC) 2.5 mg tablet   5. CHELE on CPAP G47.33 327.23     Z99.89 V46.8    6. History of pancreatitis C32.40 M98.36 METABOLIC PANEL, COMPREHENSIVE      LIPASE      AMYLASE ISOENZYMES   7. Vitamin D deficiency W95.9 788.1 METABOLIC PANEL, COMPREHENSIVE      ergocalciferol (VITAMIN D2) 50,000 unit capsule      VITAMIN D, 25 HYDROXY   8. Anemia, unspecified type D64.9 285.9 CBC WITH AUTOMATED DIFF      FERRITIN      IRON PROFILE   9. Severe obesity (BMI 35.0-39. 9) with comorbidity ([facility] E66.01 278.01        DM2: not controlled to target A1c <7. Continue metformin  Add GLP1 agonist. Reviewed prior assumption Byetta had triggered pancreatitis in 2012 - couldn't be full story given recent hx. Emphasized need to seek care should symptoms return, which is possible but not likely. Stop mealtime insulin. Decrease basal insulin to 15 units daily. hypertension uncontrolled. Continue ARB. \"side effect\" of dizziness with CCB actually was efficacy. Take it easy in early days. Use CPAP consistently    Morbid obesity: GLP1 agonist will help comply with heathy diet recommendations. Vit d deficiency: replace.  Retry statin when normal.    NAFLD: Risk calculator suggests high likelihood of fibrosis. There is a possibility that some of the lab data could have been altered by concurrent acute pancreatitis. After discussing the potentially dangerous nature of liver scarring progression toward liver failure, given options of referring to hepatologist today vs reassessing calculation in a couple of months, he chose to wait, which is reasonable. Anemia: labs could reflect acute pancreatitis. Reassess next set of labs. Refer GI if still iron deficient. Follow-up Disposition:  Return in about 1 month (around 2018) for blood pressure follow up, no labs prior. Labs ordered are for 3 month follow up    Subjective:   Yamile Garcia was seen today for Diabetes    FU DM2 with nephropathy/neuropathy on:  Metformin  Basal insulin 22 units  Short acting insulin: 5 units twice daily    Home glucoses:  Fastin-150  PP lunch: NA  PP dinner: NA  Hypoglycemic episodes: none    Struggles to control appetite      Hypertension:   ARB and lasix (none of the latter in the past few days - located in the other room, sick of meds)  Amlodipine - \"dizziness\" in the past    Inconsistent compliance with CPAP. Confirms better BP on days after uses it. FU Recent pancreatitis: reports reviewed and received - no trigger. Remains asymptomatic. Hospital records also noteworthy for:    1. US showed NAFLD:    Risk calculator:  Age (years) 65  BMI (kg/m2) 35.1  IGF/diabetes yes  AST 22  ALT 35  Platelets (C060/A) 142  Albumin (g/l) 3.2  Score   1.982   < -1.455: predictor of absence of significant fibrosis (F0-F2 fibrosis)  ? -1.455 to ? 0.675: indeterminate score  > 0.675: predictor of presence of significant fibrosis (F3-F4 fibrosis)   BMI: body mass index  IGF: impaired fasting glucose       Microcytic Anemia:  Notes previously told iron deficiency. Had a colon polyp removed several years ago. Recalls 10 year recommendation  Lost to follow up.  Not on iron.      Brief Labs:     Lab Results   Component Value Date/Time    Sodium 138 04/04/2018 02:56 PM    Potassium 3.6 04/04/2018 02:56 PM    Creatinine 0.97 04/04/2018 02:56 PM    Cholesterol, total 229 05/11/2018 02:59 PM    HDL Cholesterol 26 05/11/2018 02:59 PM    LDL, calculated 125 05/11/2018 02:59 PM    Triglyceride 388 05/11/2018 02:59 PM    Hemoglobin A1c 7.8 05/11/2018 02:59 PM      Results for orders placed or performed in visit on 05/11/18   VITAMIN B12 & FOLATE   Result Value Ref Range    Vitamin B12 502 211 - 911 pg/mL    Folate 7.45 >=3.10 ng/mL   MAGNESIUM   Result Value Ref Range    Magnesium 1.3 (L) 1.6 - 2.5 mg/dL   MICROALBUMIN, UR, RAND W/ MICROALB/CREAT RATIO   Result Value Ref Range    Creatinine, urine 85 mg/dL    Microalbumin, urine 1014.2 (H) 0.1 - 17.0 ug/mL    Microalb/Creat ratio (ug/mg creat.) 1193.2 (H) 0.0 - 30.0 mcg/mg of Creatinine   VITAMIN D, 25 HYDROXY   Result Value Ref Range    VITAMIN D, 25-HYDROXY 19.6 (L) 32.0 - 100.0 ng/mL   LIPID PANEL   Result Value Ref Range    Triglyceride 388 (H) 40 - 149 mg/dL    HDL Cholesterol 26 (L) 40 - 59 mg/dL    Cholesterol, total 229 (H) 110 - 200 mg/dL    CHOLESTEROL/HDL 8.8 0.0 - 5.0    LDL, calculated 125 (H) 50 - 99 mg/dL    VLDL, calculated 78 (H) 8 - 30 mg/dL   HEMOGLOBIN A1C W/O EAG   Result Value Ref Range    Hemoglobin A1c 7.8 (H) 4.8 - 5.9 %    AVG  (H) 91 - 123 mg/dL     Lab Results   Component Value Date/Time    ALT (SGPT) 35 04/04/2018 02:56 PM    AST (SGOT) 22 04/04/2018 02:56 PM    Alk.  phosphatase 47 04/04/2018 02:56 PM    Bilirubin, total 0.3 04/04/2018 02:56 PM     Lab Results   Component Value Date/Time    WBC 7.7 04/04/2018 02:56 PM    Hemoglobin, POC 14.6 03/04/2010 10:23 AM    HGB 11.3 (L) 04/04/2018 02:56 PM    Hematocrit, POC 43 03/04/2010 10:23 AM    HCT 35.1 (L) 04/04/2018 02:56 PM    PLATELET 778 54/53/5460 02:56 PM    MCV 75.2 04/04/2018 02:56 PM       Lab Results   Component Value Date/Time    Sodium 138 04/04/2018 02:56 PM    Potassium 3.6 04/04/2018 02:56 PM    Chloride 101 04/04/2018 02:56 PM    CO2 29 04/04/2018 02:56 PM    Anion gap 8 04/04/2018 02:56 PM    Glucose 134 (H) 04/04/2018 02:56 PM    BUN 10 04/04/2018 02:56 PM    Creatinine 0.97 04/04/2018 02:56 PM    BUN/Creatinine ratio 10 (L) 04/04/2018 02:56 PM    GFR est AA >60 04/04/2018 02:56 PM    GFR est non-AA >60 04/04/2018 02:56 PM    Calcium 9.5 04/04/2018 02:56 PM    Bilirubin, total 0.3 04/04/2018 02:56 PM    AST (SGOT) 22 04/04/2018 02:56 PM    Alk. phosphatase 47 04/04/2018 02:56 PM    Protein, total 7.0 04/04/2018 02:56 PM    Albumin 3.2 (L) 04/04/2018 02:56 PM    Globulin 3.8 04/04/2018 02:56 PM    A-G Ratio 0.8 04/04/2018 02:56 PM    ALT (SGPT) 35 04/04/2018 02:56 PM         Prior to Admission medications    Medication Sig Start Date End Date Taking? Authorizing Provider   valsartan (DIOVAN) 80 mg tablet Take 1 Tab by mouth daily. 5/11/18  Yes Charis Barrett MD   cholecalciferol (VITAMIN D3) 1,000 unit tablet 2,000 Units. Yes Historical Provider   insulin aspart U-100 (NOVOLOG) 100 unit/mL injection 5 Units by SubCUTAneous route two (2) times a day. Yes Historical Provider   sildenafil citrate (VIAGRA) 50 mg tablet 50 mg. Yes Historical Provider   traMADol (ULTRAM) 50 mg tablet 100 mg. Yes Historical Provider   insulin glargine (LANTUS SOLOSTAR) 100 unit/mL (3 mL) pen 42 units BID SQ  Patient taking differently: 22 Units. 20 units BID SQ 6/16/14  Yes Wayne Odell MD   metFORMIN (GLUCOPHAGE) 1,000 mg tablet TAKE ONE TABLET BY MOUTH TWICE DAILY WITH MEALS 6/16/14  Yes Wayne Odell MD   aspirin 81 mg Tab Take  by mouth. Yes Historical Provider          Allergies   Allergen Reactions    Byetta [Exenatide] Other (comments)     Pancreatits           Review of Systems   Constitutional: Positive for malaise/fatigue (when skips CPAP). Respiratory: Negative for shortness of breath.     Cardiovascular: Negative for chest pain, orthopnea, leg swelling and PND. Gastrointestinal: Negative for abdominal pain, blood in stool, nausea and vomiting. Neurological: Negative for sensory change, speech change, focal weakness and headaches. Objective:   Physical Exam   Constitutional: He is oriented to person, place, and time. He appears well-developed and well-nourished. No distress. HENT:   Head: Normocephalic and atraumatic. Right Ear: External ear normal.   Left Ear: External ear normal.   Mouth/Throat: Oropharynx is clear and moist.   Eyes: Conjunctivae are normal. No scleral icterus. Neck: Neck supple. No thyromegaly present. Cardiovascular: Normal rate, regular rhythm, normal heart sounds and intact distal pulses. Exam reveals no gallop and no friction rub. No murmur heard. Pulmonary/Chest: Effort normal and breath sounds normal. No respiratory distress. He has no wheezes. He has no rales. Musculoskeletal: Normal range of motion. He exhibits no edema. Lymphadenopathy:     He has no cervical adenopathy. Neurological: He is alert and oriented to person, place, and time. Skin: Skin is warm and dry. Psychiatric: He has a normal mood and affect. His behavior is normal. Judgment and thought content normal.   Nursing note and vitals reviewed. Visit Vitals    /90    Pulse 90    Temp 98.6 °F (37 °C) (Oral)    Resp 16    Ht 6' 4\" (1.93 m)    Wt 288 lb 12.8 oz (131 kg)    SpO2 94%    BMI 35.15 kg/m2         Disclaimer:      He expressed understanding with the diagnosis and plan. Alternatives have been explained and offered. Medication risks/benefits/costs/interactions/alternatives discussed with patient. Anticipated time course and progression of condition reviewed. All questions have been addressed. He is encouraged to employ the information provided in the after visit summary, which was reviewed.       He is instructed to call the clinic if he has not been notified either by phone or through [address] with the results of his tests or with an appointment plan for any referrals within 1 week(s). No news is not good news; it's no news. The patient  is to call if his condition worsens or fails to improve or if significant side effects are experienced. Aspects of this note may have been generated using voice recognition software. Despite editing, there may be unrecognized errors. Over 50% of the 64 minutes face to face with Nida Brown. consisted of counseling and/or discussing treatment plans.          Mleanie Veloz MD

## 2018-05-24 NOTE — PROGRESS NOTES
Patient here for f/u on his lab results and DM. No concerns. Medication reconciliation has been completed with patient. Care team discussed/updated as well as pharmacy. Care every where has been ran. 1. Have you been to the ER, urgent care clinic since your last visit? Hospitalized since your last visit? No  2. Have you seen or consulted any other health care providers outside of the [address] [place] [address] since your last visit? Include any pap smears or colon screening. Yes When: 5/24/18 Where: [facility] Reason for visit: routine exam  Health Maintenance reviewed - Pending record receipt.

## 2018-05-25 PROBLEM — E11.311 DIABETIC MACULAR EDEMA (HCC): Status: ACTIVE | Noted: 2018-05-25

## 2018-08-22 DIAGNOSIS — D64.9 ANEMIA, UNSPECIFIED TYPE: ICD-10-CM

## 2018-08-23 DIAGNOSIS — E55.9 VITAMIN D DEFICIENCY: ICD-10-CM

## 2018-08-23 DIAGNOSIS — E11.21 TYPE 2 DIABETES WITH NEPHROPATHY (HCC): ICD-10-CM

## 2018-08-23 DIAGNOSIS — D64.9 ANEMIA, UNSPECIFIED TYPE: ICD-10-CM

## 2018-08-24 DIAGNOSIS — Z79.4 TYPE 2 DIABETES MELLITUS WITH DIABETIC POLYNEUROPATHY, WITH LONG-TERM CURRENT USE OF INSULIN (HCC): ICD-10-CM

## 2018-08-24 DIAGNOSIS — E55.9 VITAMIN D DEFICIENCY: ICD-10-CM

## 2018-08-24 DIAGNOSIS — Z87.19 HISTORY OF PANCREATITIS: ICD-10-CM

## 2018-08-24 DIAGNOSIS — K76.0 NAFLD (NONALCOHOLIC FATTY LIVER DISEASE): ICD-10-CM

## 2018-08-24 DIAGNOSIS — D64.9 ANEMIA, UNSPECIFIED TYPE: ICD-10-CM

## 2018-08-24 DIAGNOSIS — E11.42 TYPE 2 DIABETES MELLITUS WITH DIABETIC POLYNEUROPATHY, WITH LONG-TERM CURRENT USE OF INSULIN (HCC): ICD-10-CM

## 2018-08-24 DIAGNOSIS — E11.21 TYPE 2 DIABETES WITH NEPHROPATHY (HCC): ICD-10-CM

## 2018-08-24 DIAGNOSIS — I10 ESSENTIAL HYPERTENSION: ICD-10-CM

## 2018-09-28 DIAGNOSIS — I10 ESSENTIAL HYPERTENSION: ICD-10-CM

## 2018-09-28 RX ORDER — AMLODIPINE BESYLATE 2.5 MG/1
TABLET ORAL
Qty: 30 TAB | Refills: 0 | Status: SHIPPED | OUTPATIENT
Start: 2018-09-28 | End: 2018-10-09 | Stop reason: ALTCHOICE

## 2018-09-28 NOTE — TELEPHONE ENCOUNTER
Patient has been scheduled to come in on 10/2/2018 for lab draw and has a f/u appointment on 10/9/18. He states he does need his medication sent in to his pharmacy will run completely out over the weekend.

## 2018-09-28 NOTE — TELEPHONE ENCOUNTER
Automated refill request. Last seen in March with a recommendation to follow up 1 month later for his uncontrolled DM2. Does he wish to continue seeing me? If so, please schedule an appointment for reevaluation of hypertension, DM2 with labs prior (27). Interval supply of needed meds will be authorized.  ANA LILIA

## 2018-10-04 LAB
25(OH)D3 SERPL-MCNC: 23.9 NG/ML (ref 32–100)
A-G RATIO,AGRAT: 1.5 RATIO (ref 1.1–2.6)
ABSOLUTE LYMPHOCYTE COUNT, 10803: 1.6 K/UL (ref 1–4.8)
ALBUMIN SERPL-MCNC: 3.9 G/DL (ref 3.5–5)
ALP SERPL-CCNC: 50 U/L (ref 40–125)
ALT SERPL-CCNC: 23 U/L (ref 5–40)
ANION GAP SERPL CALC-SCNC: 15 MMOL/L
AST SERPL W P-5'-P-CCNC: 19 U/L (ref 10–37)
AVG GLU, 10930: 187 MG/DL (ref 91–123)
BASOPHILS # BLD: 0 K/UL (ref 0–0.2)
BASOPHILS NFR BLD: 1 % (ref 0–2)
BILIRUB SERPL-MCNC: 0.3 MG/DL (ref 0.2–1.2)
BUN SERPL-MCNC: 12 MG/DL (ref 6–22)
CALCIUM SERPL-MCNC: 9.3 MG/DL (ref 8.4–10.4)
CHLORIDE SERPL-SCNC: 98 MMOL/L (ref 98–110)
CO2 SERPL-SCNC: 26 MMOL/L (ref 20–32)
CREAT SERPL-MCNC: 0.8 MG/DL (ref 0.8–1.6)
CREATININE, URINE: 146 MG/DL
EOSINOPHIL # BLD: 0.2 K/UL (ref 0–0.5)
EOSINOPHIL NFR BLD: 2 % (ref 0–6)
ERYTHROCYTE [DISTWIDTH] IN BLOOD BY AUTOMATED COUNT: 13.8 % (ref 10–15.5)
FE % SATURATION,PSAT: 27 % (ref 20–50)
FERRITIN SERPL-MCNC: 239 NG/ML (ref 22–322)
GFRAA, 66117: >60
GFRNA, 66118: >60
GLOBULIN,GLOB: 2.6 G/DL (ref 2–4)
GLUCOSE SERPL-MCNC: 188 MG/DL (ref 70–99)
GRANULOCYTES,GRANS: 67 % (ref 40–75)
HBA1C MFR BLD HPLC: 8.2 % (ref 4.8–5.9)
HCT VFR BLD AUTO: 38.4 % (ref 39.3–51.6)
HGB BLD-MCNC: 11.2 G/DL (ref 13.1–17.2)
IRON,IRN: 77 MCG/DL (ref 45–160)
LIPASE SERPL-CCNC: 32 U/L (ref 7–60)
LYMPHOCYTES, LYMLT: 23 % (ref 20–45)
Lab: 28 U/L
Lab: 30 U/L
Lab: 58 U/L
MCH RBC QN AUTO: 24 PG (ref 26–34)
MCHC RBC AUTO-ENTMCNC: 29 G/DL (ref 31–36)
MCV RBC AUTO: 82 FL (ref 80–95)
MICROALB/CREAT RATIO, 140286: 1414.9 MCG/MG OF CREATININE (ref 0–30)
MICROALBUMIN,URINE RANDOM 140054: 2065.7 MCG/ML (ref 0.1–17)
MONOCYTES # BLD: 0.5 K/UL (ref 0.1–1)
MONOCYTES NFR BLD: 7 % (ref 3–12)
NEUTROPHILS # BLD AUTO: 4.5 K/UL (ref 1.8–7.7)
PLATELET # BLD AUTO: 257 K/UL (ref 140–440)
PMV BLD AUTO: 11.2 FL (ref 9–13)
POTASSIUM SERPL-SCNC: 4.5 MMOL/L (ref 3.5–5.5)
PROT SERPL-MCNC: 6.5 G/DL (ref 6.2–8.1)
RBC # BLD AUTO: 4.69 M/UL (ref 3.8–5.8)
SODIUM SERPL-SCNC: 139 MMOL/L (ref 133–145)
TIBC,TIBC: 284 MCG/DL (ref 228–428)
UIBC SERPL-MCNC: 207 MCG/DL (ref 110–370)
WBC # BLD AUTO: 6.8 K/UL (ref 4–11)

## 2018-10-09 ENCOUNTER — OFFICE VISIT (OUTPATIENT)
Dept: FAMILY MEDICINE CLINIC | Age: 63
End: 2018-10-09

## 2018-10-09 VITALS
WEIGHT: 293 LBS | DIASTOLIC BLOOD PRESSURE: 74 MMHG | OXYGEN SATURATION: 98 % | BODY MASS INDEX: 35.68 KG/M2 | HEIGHT: 76 IN | SYSTOLIC BLOOD PRESSURE: 126 MMHG | HEART RATE: 100 BPM | TEMPERATURE: 98 F | RESPIRATION RATE: 14 BRPM

## 2018-10-09 DIAGNOSIS — Z79.4 TYPE 2 DIABETES MELLITUS WITH BOTH EYES AFFECTED BY PROLIFERATIVE RETINOPATHY AND MACULAR EDEMA, WITH LONG-TERM CURRENT USE OF INSULIN (HCC): ICD-10-CM

## 2018-10-09 DIAGNOSIS — E11.42 TYPE 2 DIABETES MELLITUS WITH DIABETIC POLYNEUROPATHY, WITH LONG-TERM CURRENT USE OF INSULIN (HCC): Primary | ICD-10-CM

## 2018-10-09 DIAGNOSIS — E11.21 TYPE 2 DIABETES WITH NEPHROPATHY (HCC): ICD-10-CM

## 2018-10-09 DIAGNOSIS — E11.3513 TYPE 2 DIABETES MELLITUS WITH BOTH EYES AFFECTED BY PROLIFERATIVE RETINOPATHY AND MACULAR EDEMA, WITH LONG-TERM CURRENT USE OF INSULIN (HCC): ICD-10-CM

## 2018-10-09 DIAGNOSIS — Z79.4 TYPE 2 DIABETES MELLITUS WITH DIABETIC POLYNEUROPATHY, WITH LONG-TERM CURRENT USE OF INSULIN (HCC): Primary | ICD-10-CM

## 2018-10-09 DIAGNOSIS — E66.01 SEVERE OBESITY (BMI 35.0-35.9 WITH COMORBIDITY) (HCC): ICD-10-CM

## 2018-10-09 DIAGNOSIS — E55.9 VITAMIN D DEFICIENCY: ICD-10-CM

## 2018-10-09 RX ORDER — ERGOCALCIFEROL 1.25 MG/1
50000 CAPSULE ORAL 2 TIMES WEEKLY
Qty: 40 CAP | Refills: 1 | Status: SHIPPED | OUTPATIENT
Start: 2018-10-12 | End: 2018-11-16 | Stop reason: SDUPTHER

## 2018-10-09 NOTE — PROGRESS NOTES
Savannah Pelaez. is a 65 y.o. male who was seen in clinic today (10/9/2018). Assessment & Plan:       ICD-10-CM ICD-9-CM    1. Type 2 diabetes mellitus with diabetic polyneuropathy, with long-term current use of insulin ([facility]) E11.42 250.60 dulaglutide (TRULICITY) 1.5 DN/8.9 mL sub-q pen    Z79.4 357.2 REFERRAL TO BARIATRIC SURGERY     V58.67    2. Type 2 diabetes mellitus with both eyes affected by proliferative retinopathy and macular edema, with long-term current use of insulin ([facility]) E11.3513 250.50     Z79.4 362.02      V58.67      362.07    3. Type 2 diabetes with nephropathy ([facility]) E11.21 250.40      583.81    4. Severe obesity (BMI 35.0-35.9 with comorbidity) ([facility]) E66.01 278.01 REFERRAL TO BARIATRIC SURGERY    Z68.35 V85.35    5. Vitamin D deficiency E55.9 268.9 ergocalciferol (VITAMIN D2) 50,000 unit capsule       Sounds like symptomatic hypertension without focal neuro changes. Resolved. Admonished against adjusting meds without physician oversight  Admonished for not seeking care in context of symptoms he himself thought might be a stroke. Deferring further discussion of anemia/liver disease while we focus on DM control to avoid further miscommunication. Hypertension: Well controlled, continue present management, ie ARB alone  DM2 with neuropathy and retinopathy: uncontrolled and worsening  Continue metformin unchanged  Double GLP-1 agonist dose  Regain consistency with insulin  We had a jori conversation that he needs to take his condition seriously which will entail full attention toward an overhaul of his diet and exercise to address his severe obesity and that medications alone cannot overcome poor maintenance. Influenza vaccination was recommended in accordance with CDC guidelines. He declined. I encouraged him to reconsider and return should he change his mind.   Also declined pneumovax    Follow-up Disposition:  Return in about 1 month (around 11/9/2018) for diabetes follow up, no labs prior, (30). Subjective:   Tom Delgado was seen today for Hypertension (pt in office for f/u and lab results) and Diabetes    FU uncontrolled DM2: on metformin, added GLP1 agonist last visit. Stopped mealtime insulin. Decreased basal insulin to 15 units daily. Metformin through the [place]    Soc: lots of stress - wife with open heart surgery, interfering with compliance (insulin)    Home glucoses:  Fastings: 170-200      FU uncontrolled hypertension. Continued ARB. Tried CCB again without side effects. Did well until 2 weeks ago:  Sudden onset severe right sided headache associated with photosensitivity x 35-40 minutes resolved with aspirin, followed 4-5 days later by tingling on the left side of his face x 10-15 seconds    Had misunderstood and STOPPED the ARB. Also then missed the CCB the day before this incident  He then stopped the CCB and resumed the ARB instead.       FU Morbid obesity: interval gain 5 lb  Vit d deficiency: initiated replacement to facilitate retrial statin     FU NAFLD: Risk calculator suggests high likelihood of fibrosis. After discussing the potentially dangerous nature of liver scarring progression toward liver failure, given options of referring to hepatologist today vs reassessing calculation in a couple of months, he chose to wait       FU Anemia: labs could reflect acute pancreatitis. Reassess next set of labs.  Refer GI if still iron deficient.       Lab Results   Component Value Date/Time    Hemoglobin A1c 8.2 (H) 10/02/2018 12:00 PM    Hemoglobin A1c 7.8 (H) 05/11/2018 02:59 PM    Hemoglobin A1c 11.0 (H) 04/26/2012 11:01 AM    Hemoglobin A1c 7.0 (H) 09/14/2011 08:22 AM    Glucose 188 (H) 10/02/2018 12:00 PM    Glucose (POC) 293 (H) 09/07/2010 08:32 AM    Glucose,  (H) 03/04/2010 10:23 AM    Microalb/Creat ratio (ug/mg creat.) 1414.9 (H) 10/02/2018 12:00 PM    LDL, calculated 125 (H) 05/11/2018 02:59 PM    Creatinine 0.8 10/02/2018 12:00 PM       Results for orders placed or performed in visit on 64/87/60   METABOLIC PANEL, COMPREHENSIVE   Result Value Ref Range    Glucose 188 (H) 70 - 99 mg/dL    BUN 12 6 - 22 mg/dL    Creatinine 0.8 0.8 - 1.6 mg/dL    Sodium 139 133 - 145 mmol/L    Potassium 4.5 3.5 - 5.5 mmol/L    Chloride 98 98 - 110 mmol/L    CO2 26 20 - 32 mmol/L    AST (SGOT) 19 10 - 37 U/L    ALT (SGPT) 23 5 - 40 U/L    Alk. phosphatase 50 40 - 125 U/L    Bilirubin, total 0.3 0.2 - 1.2 mg/dL    Calcium 9.3 8.4 - 10.4 mg/dL    Protein, total 6.5 6.2 - 8.1 g/dL    Albumin 3.9 3.5 - 5.0 g/dL    A-G Ratio 1.5 1.1 - 2.6 ratio    Globulin 2.6 2.0 - 4.0 g/dL    Anion gap 15.0 mmol/L    GFRAA >60.0 >60.0    GFRNA >60.0 >60.0   FERRITIN   Result Value Ref Range    Ferritin 239 22 - 322 ng/mL   IRON PROFILE   Result Value Ref Range    Iron 77 45 - 160 mcg/dL    UIBC 207 110 - 370 mcg/dL    TIBC 284 228 - 428 mcg/dL    Iron % saturation 27 20 - 50 %   LIPASE   Result Value Ref Range    Lipase 32 7 - 60 U/L   VITAMIN D, 25 HYDROXY   Result Value Ref Range    VITAMIN D, 25-HYDROXY 23.9 (L) 32.0 - 100.0 ng/mL   AMYLASE ISOENZYMES   Result Value Ref Range    AMYLASE PANCREATIC FRACTION 28 10 - 53 U/L    AMYLASE SALIVARY 30 11 - 83 U/L    AMYLASE TOTAL ISOENZYME 58 31 - 124 U/L   CBC WITH AUTOMATED DIFF   Result Value Ref Range    WBC 6.8 4.0 - 11.0 K/uL    RBC 4.69 3.80 - 5.80 M/uL    HGB 11.2 (L) 13.1 - 17.2 g/dL    HCT 38.4 (L) 39.3 - 51.6 %    MCV 82 80 - 95 fL    MCH 24 (L) 26 - 34 pg    MCHC 29 (L) 31 - 36 g/dL    RDW 13.8 10.0 - 15.5 %    PLATELET 740 414 - 603 K/uL    MPV 11.2 9.0 - 13.0 fL    NEUTROPHILS 67 40 - 75 %    Lymphocytes 23 20 - 45 %    MONOCYTES 7 3 - 12 %    EOSINOPHILS 2 0 - 6 %    BASOPHILS 1 0 - 2 %    ABS. NEUTROPHILS 4.5 1.8 - 7.7 K/uL    ABSOLUTE LYMPHOCYTE COUNT 1.6 1.0 - 4.8 K/uL    ABS. MONOCYTES 0.5 0.1 - 1.0 K/uL    ABS. EOSINOPHILS 0.2 0.0 - 0.5 K/uL    ABS.  BASOPHILS 0.0 0.0 - 0.2 K/uL   HEMOGLOBIN A1C W/O EAG   Result Value Ref Range Hemoglobin A1c 8.2 (H) 4.8 - 5.9 %    AVG  (H) 91 - 123 mg/dL   MICROALBUMIN, UR, RAND   Result Value Ref Range    Creatinine, urine 146 mg/dL    Microalbumin, urine 2065.7 (H) 0.1 - 17.0 mcg/mL    Microalb/Creat ratio (ug/mg creat.) 1414.9 (H) 0.0 - 30.0 mcg/mg of Creatinine        Outpatient Prescriptions Marked as Taking for the 10/9/18 encounter (Office Visit) with Savannah Miner MD   Medication Sig Dispense Refill    dulaglutide (TRULICITY) 1.87 YJ/2.8 mL sub-q pen 0.5 mL by SubCUTAneous route every seven (7) days. 4 Pen 6    insulin glargine (LANTUS SOLOSTAR U-100 INSULIN) 100 unit/mL (3 mL) inpn 15 Units by SubCUTAneous route daily. 5 Pen 0    ergocalciferol (VITAMIN D2) 50,000 unit capsule Take 1 Cap by mouth every seven (7) days. Start with 1 cap daily for 3 days 20 Cap 1    sildenafil citrate (VIAGRA) 50 mg tablet 50 mg.      traMADol (ULTRAM) 50 mg tablet 100 mg.      metFORMIN (GLUCOPHAGE) 1,000 mg tablet TAKE ONE TABLET BY MOUTH TWICE DAILY WITH MEALS 180 Tab 3    aspirin 81 mg Tab Take  by mouth. Patient Active Problem List    Diagnosis    Diabetic macular edema ([address]     OD 4/19/2018 Joy Jamil MD      NAFLD (nonalcoholic fatty liver disease)      4/2018 [facility]. Mean liver stiffness value 1.31 (normal to mild stiffness)      History of pancreatitis     2012, 4/2018: no clear trigger. Lipase 682 on admission. biliary sludge without stones      Type 2 diabetes with nephropathy (HCC)    Severe obesity (BMI 35.0-39. 9) with comorbidity ([address]    CHELE on CPAP    Microalbuminuria    Prostate cancer ([address]    Type 2 diabetes mellitus with both eyes affected by proliferative retinopathy and macular edema, with long-term current use of insulin ([address]     4/19/2018 Joy Jamil MD      Essential hypertension    Elevated cholesterol with high triglycerides    Obesity    Elevated PSA         Allergies   Allergen Reactions    Byetta [Exenatide] Other (comments)     Pancreatits         Patient Care Team:  Denisha Vences MD as PCP - General (Family Practice)  Salma Cole NP as Physician (Nurse Practitioner)  Zackery Jones MD (Ophthalmology)    The following sections were reviewed & updated as appropriate: PMH, PSH, FH, and SH. Review of Systems   Constitutional: Negative for malaise/fatigue. Respiratory: Negative for shortness of breath. Cardiovascular: Negative for chest pain. Neurological: Negative for speech change and focal weakness. Objective:     Visit Vitals    /74    Pulse 100    Temp 98 °F (36.7 °C)    Resp 14    Ht 6' 4\" (1.93 m)    Wt 293 lb (132.9 kg)    SpO2 98%    BMI 35.67 kg/m2      Physical Exam   Constitutional: He is oriented to person, place, and time. He appears well-developed. No distress. Pleasant severely obese black male   HENT:   Head: Normocephalic and atraumatic. Pulmonary/Chest: Effort normal.   Neurological: He is alert and oriented to person, place, and time. Skin: Skin is warm and dry. Psychiatric: He has a normal mood and affect. His behavior is normal. Judgment and thought content normal.   Diabetic foot exam:     Left Foot:   Visual Exam: Flaking in moccasin distribution, nails thickened yellowed and crumbly   Pulse DP: 1+ (weak)   Filament test: reduced sensation          Right Foot:   Visual Exam: Flaking in moccasin distribution, nails thickened yellowed and crumbly   Pulse DP: 1+ (weak)   Filament test: reduced sensation              Disclaimer: The patient understands our medical plan. Alternatives have been explained and offered. The risks, benefits and significant side effects of all medications have been reviewed. Anticipated time course and progression of condition reviewed. All questions have been addressed. He is encouraged to employ the information provided in the after visit summary, which was reviewed.       Where appropriate, he is instructed to call the clinic if he has not been notified either by phone or through [address] with the results of his tests or with an appointment plan for any referrals within 1 week(s). No news is not good news; it's no news. The patient  is to call if his condition worsens or fails to improve or if significant side effects are experienced. Aspects of this note may have been generated using voice recognition software. Despite editing, there may be unrecognized errors.        Denisha Vences MD

## 2018-10-09 NOTE — PROGRESS NOTES
Chief Complaint   Patient presents with    Hypertension     pt in office for f/u and lab results    Diabetes     Pt states that he has stopped the amlodipine, because it was causing 'headaches and dizziness'. He is just taking the Diovan. 1. Have you been to the ER, urgent care clinic since your last visit? Hospitalized since your last visit? No    2. Have you seen or consulted any other health care providers outside of the [address] [place] [address] since your last visit? Include any pap smears or colon screening.  No

## 2018-10-09 NOTE — MR AVS SNAPSHOT
[facility] [address] [place] [address] 
[phone] Patient: Taylor Triplett. MRN: [id number] :1955 Visit Information Date & Time Provider Department Dept. Phone Encounter #  
 10/9/2018  2:00 PM Denisha Vences MD [facility] [phone] [id number] Follow-up Instructions Return in about 1 month (around 2018) for diabetes follow up, no labs prior, (30). Upcoming Health Maintenance Date Due Hepatitis C Screening 1955 FOOT EXAM Q1 1966 Pneumococcal 19-64 Highest Risk (1 of 3 - PCV13) 1975 DTaP/Tdap/Td series (1 - Tdap) 1977 Shingrix Vaccine Age 50> (1 of 2) 2006 FOBT Q 1 YEAR AGE 50-75 2006 Influenza Age 5 to Adult 2018 HEMOGLOBIN A1C Q6M 2019 EYE EXAM RETINAL OR DILATED Q1 2019 LIPID PANEL Q1 2019 MICROALBUMIN Q1 10/2/2019 Allergies as of 10/9/2018  Review Complete On: 10/9/2018 By: Denisha Vences MD  
  
 Severity Noted Reaction Type Reactions Byetta [Exenatide] High 2014    Other (comments) Pancreatits Current Immunizations  Never Reviewed No immunizations on file. Not reviewed this visit You Were Diagnosed With   
  
 Codes Comments Severe obesity (BMI 35.0-35.9 with comorbidity) ([facility]    -  Primary ICD-10-CM: E66.01, Z68.35 ICD-9-CM: 278.01, V85.35 Type 2 diabetes mellitus with diabetic polyneuropathy, with long-term current use of insulin (HCC)     ICD-10-CM: E11.42, Z79.4 ICD-9-CM: 250.60, 357.2, V58.67 Vitamin D deficiency     ICD-10-CM: E55.9 ICD-9-CM: 268.9 Vitals BP Pulse Temp Resp Height(growth percentile) Weight(growth percentile) 126/74 100 98 °F (36.7 °C) 14 6' 4\" (1.93 m) 293 lb (132.9 kg) SpO2 BMI Smoking Status 98% 35.67 kg/m2 Former Smoker BMI and BSA Data Body Mass Index Body Surface Area  
 35.67 kg/m 2 2.67 m 2 Preferred Pharmacy Pharmacy Name Phone [address] [place] [address] [phone] Your Updated Medication List  
  
   
This list is accurate as of 10/9/18  2:40 PM.  Always use your most recent med list.  
  
  
  
  
 aspirin 81 mg tablet Take  by mouth. dulaglutide 1.5 mg/0.5 mL sub-q pen Commonly known as:  TRULICITY  
0.5 mL by SubCUTAneous route every seven (7) days. ergocalciferol 50,000 unit capsule Commonly known as:  VITAMIN D2 Take 1 Cap by mouth two (2) times a week. Start with 1 cap daily for 3 days Start taking on:  10/12/2018  
  
 insulin glargine 100 unit/mL (3 mL) Inpn Commonly known as:  LANTUS SOLOSTAR U-100 INSULIN  
15 Units by SubCUTAneous route daily. metFORMIN 1,000 mg tablet Commonly known as:  GLUCOPHAGE  
TAKE ONE TABLET BY MOUTH TWICE DAILY WITH MEALS  
  
 sildenafil citrate 50 mg tablet Commonly known as:  VIAGRA 50 mg.  
  
 traMADol 50 mg tablet Commonly known as:  ULTRAM  
100 mg.  
  
 valsartan 80 mg tablet Commonly known as:  DIOVAN Take 1 Tab by mouth daily. Prescriptions Sent to Pharmacy Refills  
 dulaglutide (TRULICITY) 1.5 GF/0.8 mL sub-q pen 0 Si.5 mL by SubCUTAneous route every seven (7) days. Class: Normal  
 Pharmacy: [facility] DR LISA BARNHART [address] [place] [address] Ph #: [phone] Route: SubCUTAneous  
 ergocalciferol (VITAMIN D2) 50,000 unit capsule 1 Starting on: 10/12/2018 Sig: Take 1 Cap by mouth two (2) times a week. Start with 1 cap daily for 3 days Class: Normal  
 Pharmacy: [facility] DR LISA BARNHART [address] [place] [address] Ph #: [phone] Route: Oral  
  
We Performed the Following REFERRAL TO BARIATRIC SURGERY [[id number] Custom]  Comments:  
 Sheree Alcala is a 65 y.o. male with severe obesity with uncontrolled DM2. Please evaluate and treat as indicated. Thank you. Follow-up Instructions Return in about 1 month (around 11/9/2018) for diabetes follow up, no labs prior, (30). Referral Information Referral ID Referred By Referred To  
  
 [id number] Cristo CRANE MD   
   [address] [address] [facility] Phone: [phone] Fax: [phone] Visits Status Start Date End Date 1 New Request 10/9/18 10/9/19 If your referral has a status of pending review or denied, additional information will be sent to support the outcome of this decision. Patient Instructions Pneumococcal Polysaccharide Vaccine: What You Need to Know Why get vaccinated? Vaccination can protect older adults (and some children and younger adults) from pneumococcal disease. Pneumococcal disease is caused by bacteria that can spread from person to person through close contact. It can cause ear infections, and it can a Influenza (Flu) Vaccine: Care Instructions Your Care Instructions Influenza (flu) is an infection in the lungs and breathing passages. It is caused by the influenza virus. There are different strains, or types, of the flu virus every year. The flu comes on quickly. It can cause a cough, stuffy nose, fever, chills, tiredness, and aches and pains. These symptoms may last up to 10 days. The flu can make you feel very sick, but most of the time it doesn't lead to other problems. But it can cause serious problems in people who are older or who have a long-term illness, such as heart disease or diabetes. You can help prevent the flu by getting a flu vaccine every year, as soon as it is available. You cannot get the flu from the vaccine. The vaccine prevents most cases of the flu.  But even when the vaccine doesn't prevent the flu, it can make symptoms less severe and reduce the chance of problems from the flu. Sometimes, young children and people who have an immune system problem may have a slight fever or muscle aches or pains 6 to 12 hours after getting the shot. These symptoms usually last 1 or 2 days. Follow-up care is a key part of your treatment and safety. Be sure to make and go to all appointments, and call your doctor if you are having problems. It's also a good idea to know your test results and keep a list of the medicines you take. Who should get the flu vaccine? Everyone age 7 months or older should get a flu vaccine each year. It lowers the chance of getting and spreading the flu. The vaccine is very important for people who are at high risk for getting other health problems from the flu. This includes: · Anyone 48years of age or older. · People who live in a long-term care center, such as a nursing home. · All children 6 months through 25years of age. · Adults and children 6 months and older who have long-term heart or lung problems, such as asthma. · Adults and children 6 months and older who needed medical care or were in a hospital during the past year because of diabetes, chronic kidney disease, or a weak immune system (including HIV or AIDS). · Women who will be pregnant during the flu season. · People who have any condition that can make it hard to breathe or swallow (such as a brain injury or muscle disorders). · People who can give the flu to others who are at high risk for problems from the flu. This includes all health care workers and close contacts of people age 72 or older. Who should not get the flu vaccine? The person who gives the vaccine may tell you not to get it if you: 
· Have a severe allergy to eggs or any part of the vaccine. · Have had a severe reaction to a flu vaccine in the past. 
· Have had Guillain-Barré syndrome (GBS). · Are sick with a fever. (Get the vaccine when symptoms are gone.) How can you care for yourself at home? · If you or your child has a sore arm or a slight fever after the shot, take an over-the-counter pain medicine, such as acetaminophen (Tylenol) or ibuprofen (Advil, Motrin). Read and follow all instructions on the label. Do not give aspirin to anyone younger than 20. It has been linked to Reye syndrome, a serious illness. · Do not take two or more pain medicines at the same time unless the doctor told you to. Many pain medicines have acetaminophen, which is Tylenol. Too much acetaminophen (Tylenol) can be harmful. When should you call for help? Call 911 anytime you think you may need emergency care. For example, call if after getting the flu vaccine: 
  · You have symptoms of a severe reaction to the flu vaccine. Symptoms of a severe reaction may include: ¨ Severe difficulty breathing. ¨ Sudden raised, red areas (hives) all over your body. ¨ Severe lightheadedness.  
 Call your doctor now or seek immediate medical care if after getting the flu vaccine: 
  · You think you are having a reaction to the flu vaccine, such as a new fever.  
 Watch closely for changes in your health, and be sure to contact your doctor if you have any problems. Where can you learn more? Go to http://hunter-martita.info/. Enter W125 in the search box to learn more about \"Influenza (Flu) Vaccine: Care Instructions. \" Current as of: June 18, 2018 Content Version: 11.8 © 1957-7783 [employer]. Care instructions adapted under license by [employer] (which disclaims liability or warranty for this information). If you have questions about a medical condition or this instruction, always ask your healthcare professional. [place] [address] any warranty or liability for your use of this information. lso lead to more serious infections of the: 
· Lungs (pneumonia), · Blood (bacteremia), and 
· Covering of the brain and spinal cord (meningitis).  Meningitis can cause deafness and brain damage, and it can be fatal. 
Anyone can get pneumococcal disease, but children under 3years of age, people with certain medical conditions, adults over 72years of age, and cigarette smokers are at the highest risk. About 18,000 older adults die each year from pneumococcal disease in the [place]. Treatment of pneumococcal infections with penicillin and other drugs used to be more effective. But some strains of the disease have become resistant to these drugs. This makes prevention of the disease, through vaccination, even more important. Pneumococcal polysaccharide vaccine (PPSV23) Pneumococcal polysaccharide vaccine (PPSV23) protects against 23 types of pneumococcal bacteria. It will not prevent all pneumococcal disease. PPSV23 is recommended for: · All adults 72years of age and older, · Anyone 2 through 59years of age with certain long-term health problems, 
· Anyone 2 through 59years of age with a weakened immune system, 
· Adults 23 through 59years of age who smoke cigarettes or have asthma. Most people need only one dose of PPSV. A second dose is recommended for certain high-risk groups. People 72 and older should get a dose even if they have gotten one or more doses of the vaccine before they turned 65. Your healthcare provider can give you more information about these recommendations. Most healthy adults develop protection within 2 to 3 weeks of getting the shot. Some people should not get this vaccine · Anyone who has had a life-threatening allergic reaction to PPSV should not get another dose. · Anyone who has a severe allergy to any component of PPSV should not receive it. Tell your provider if you have any severe allergies. · Anyone who is moderately or severely ill when the shot is scheduled may be asked to wait until they recover before getting the vaccine. Someone with a mild illness can usually be vaccinated. · Children less than 3years of age should not receive this vaccine. · There is no evidence that PPSV is harmful to either a pregnant woman or to her fetus. However, as a precaution, women who need the vaccine should be vaccinated before becoming pregnant, if possible. Risks of a vaccine reaction With any medicine, including vaccines, there is a chance of side effects. These are usually mild and go away on their own, but serious reactions are also possible. About half of people who get PPSV have mild side effects, such as redness or pain where the shot is given, which go away within about two days. Less than 1 out of 100 people develop a fever, muscle aches, or more severe local reactions. Problems that could happen after any vaccine: · People sometimes faint after a medical procedure, including vaccination. Sitting or lying down for about 15 minutes can help prevent fainting, and injuries caused by a fall. Tell your doctor if you feel dizzy, or have vision changes or ringing in the ears. · Some people get severe pain in the shoulder and have difficulty moving the arm where a shot was given. This happens very rarely. · Any medication can cause a severe allergic reaction. Such reactions from a vaccine are very rare, estimated at about 1 in a million doses, and would happen within a few minutes to a few hours after the vaccination. As with any medicine, there is a very remote chance of a vaccine causing a serious injury or death. The safety of vaccines is always being monitored. For more information, visit: www.cdc.gov/vaccinesafety/ What if there is a serious reaction? What should I look for? Look for anything that concerns you, such as signs of a severe allergic reaction, very high fever, or unusual behavior.  
Signs of a severe allergic reaction can include hives, swelling of the face and throat, difficulty breathing, a fast heartbeat, dizziness, and weakness. These would usually start a few minutes to a few hours after the vaccination. What should I do? If you think it is a severe allergic reaction or other emergency that can't wait, call 9-1-1 or get to the nearest hospital. Otherwise, call your doctor. Afterward, the reaction should be reported to the Vaccine Adverse Event Reporting System (VAERS). Your doctor might file this report, or you can do it yourself through the VAERS web site at www.vaers. [facility].gov, or by calling 8-835.357.4120. VAERS does not give medical advice. How can I learn more? · Ask your doctor. He or she can give you the vaccine package insert or suggest other sources of information. · Call your local or state health department. · Contact the Centers for Disease Control and Prevention (CDC): 
¨ Call 0-177.304.3378 (1-800-CDC-INFO) or ¨ Visit CDC's website at www.cdc.gov/vaccines Vaccine Information Statement PPSV Vaccine 
(04/24/2015) Department of Health and [place] Centers for Disease Control and Prevention Many Vaccine Information Statements are available in Frisian and other languages. See www.immunize.org/vis. Hojas de información Sobre Vacunas están disponibles en español y en muchos otros idiomas. Visite Sage.si. Care instructions adapted under license by [employer] (which disclaims liability or warranty for this information). If you have questions about a medical condition or this instruction, always ask your healthcare professional. [place] [address] any warranty or liability for your use of this information. Introducing [facility]! Dear Isis Love: Thank you for requesting a [employer] account. Our records indicate that you already have an active [employer] account. You can access your account anytime at [email or web address] Did you know that you can access your hospital and ER discharge instructions at any time in Smart Eye? You can also review all of your test results from your hospital stay or ER visit. Additional Information If you have questions, please visit the Frequently Asked Questions section of the Smart Eye website at [email or web address] Remember, Smart Eye is NOT to be used for urgent needs. For medical emergencies, dial 911. Now available from your iPhone and Android! Please provide this summary of care documentation to your next provider. Your primary care clinician is listed as Suzan Jamil. If you have any questions after today's visit, please call [phone].

## 2018-10-09 NOTE — PATIENT INSTRUCTIONS
Pneumococcal Polysaccharide Vaccine: What You Need to Know  Why get vaccinated? Vaccination can protect older adults (and some children and younger adults) from pneumococcal disease. Pneumococcal disease is caused by bacteria that can spread from person to person through close contact. It can cause ear infections, and it can a     Influenza (Flu) Vaccine: Care Instructions  Your Care Instructions    Influenza (flu) is an infection in the lungs and breathing passages. It is caused by the influenza virus. There are different strains, or types, of the flu virus every year. The flu comes on quickly. It can cause a cough, stuffy nose, fever, chills, tiredness, and aches and pains. These symptoms may last up to 10 days. The flu can make you feel very sick, but most of the time it doesn't lead to other problems. But it can cause serious problems in people who are older or who have a long-term illness, such as heart disease or diabetes. You can help prevent the flu by getting a flu vaccine every year, as soon as it is available. You cannot get the flu from the vaccine. The vaccine prevents most cases of the flu. But even when the vaccine doesn't prevent the flu, it can make symptoms less severe and reduce the chance of problems from the flu. Sometimes, young children and people who have an immune system problem may have a slight fever or muscle aches or pains 6 to 12 hours after getting the shot. These symptoms usually last 1 or 2 days. Follow-up care is a key part of your treatment and safety. Be sure to make and go to all appointments, and call your doctor if you are having problems. It's also a good idea to know your test results and keep a list of the medicines you take. Who should get the flu vaccine? Everyone age 7 months or older should get a flu vaccine each year. It lowers the chance of getting and spreading the flu.  The vaccine is very important for people who are at high risk for getting other health problems from the flu. This includes:  · Anyone 48years of age or older. · People who live in a long-term care center, such as a nursing home. · All children 6 months through 25years of age. · Adults and children 6 months and older who have long-term heart or lung problems, such as asthma. · Adults and children 6 months and older who needed medical care or were in a hospital during the past year because of diabetes, chronic kidney disease, or a weak immune system (including HIV or AIDS). · Women who will be pregnant during the flu season. · People who have any condition that can make it hard to breathe or swallow (such as a brain injury or muscle disorders). · People who can give the flu to others who are at high risk for problems from the flu. This includes all health care workers and close contacts of people age 72 or older. Who should not get the flu vaccine? The person who gives the vaccine may tell you not to get it if you:  · Have a severe allergy to eggs or any part of the vaccine. · Have had a severe reaction to a flu vaccine in the past.  · Have had Guillain-Barré syndrome (GBS). · Are sick with a fever. (Get the vaccine when symptoms are gone.)  How can you care for yourself at home? · If you or your child has a sore arm or a slight fever after the shot, take an over-the-counter pain medicine, such as acetaminophen (Tylenol) or ibuprofen (Advil, Motrin). Read and follow all instructions on the label. Do not give aspirin to anyone younger than 20. It has been linked to Reye syndrome, a serious illness. · Do not take two or more pain medicines at the same time unless the doctor told you to. Many pain medicines have acetaminophen, which is Tylenol. Too much acetaminophen (Tylenol) can be harmful. When should you call for help? Call 911 anytime you think you may need emergency care.  For example, call if after getting the flu vaccine:    · You have symptoms of a severe reaction to the flu vaccine. Symptoms of a severe reaction may include:  ¨ Severe difficulty breathing. ¨ Sudden raised, red areas (hives) all over your body. ¨ Severe lightheadedness.    Call your doctor now or seek immediate medical care if after getting the flu vaccine:    · You think you are having a reaction to the flu vaccine, such as a new fever.    Watch closely for changes in your health, and be sure to contact your doctor if you have any problems. Where can you learn more? Go to http://hunter-martita.info/. Enter Y034 in the search box to learn more about \"Influenza (Flu) Vaccine: Care Instructions. \"  Current as of: June 18, 2018  Content Version: 11.8  © 4210-7520 [employer]. Care instructions adapted under license by [employer] (which disclaims liability or warranty for this information). If you have questions about a medical condition or this instruction, always ask your healthcare professional. [place] [address] any warranty or liability for your use of this information. lso lead to more serious infections of the:  · Lungs (pneumonia),  · Blood (bacteremia), and  · Covering of the brain and spinal cord (meningitis). Meningitis can cause deafness and brain damage, and it can be fatal.  Anyone can get pneumococcal disease, but children under 3years of age, people with certain medical conditions, adults over 72years of age, and cigarette smokers are at the highest risk. About 18,000 older adults die each year from pneumococcal disease in the [place]. Treatment of pneumococcal infections with penicillin and other drugs used to be more effective. But some strains of the disease have become resistant to these drugs. This makes prevention of the disease, through vaccination, even more important. Pneumococcal polysaccharide vaccine (PPSV23)  Pneumococcal polysaccharide vaccine (PPSV23) protects against 23 types of pneumococcal bacteria.  It will not prevent all pneumococcal disease. PPSV23 is recommended for:  · All adults 72years of age and older,  · Anyone 2 through 59years of age with certain long-term health problems,  · Anyone 2 through 59years of age with a weakened immune system,  · Adults 23 through 59years of age who smoke cigarettes or have asthma. Most people need only one dose of PPSV. A second dose is recommended for certain high-risk groups. People 72 and older should get a dose even if they have gotten one or more doses of the vaccine before they turned 65. Your healthcare provider can give you more information about these recommendations. Most healthy adults develop protection within 2 to 3 weeks of getting the shot. Some people should not get this vaccine  · Anyone who has had a life-threatening allergic reaction to PPSV should not get another dose. · Anyone who has a severe allergy to any component of PPSV should not receive it. Tell your provider if you have any severe allergies. · Anyone who is moderately or severely ill when the shot is scheduled may be asked to wait until they recover before getting the vaccine. Someone with a mild illness can usually be vaccinated. · Children less than 3years of age should not receive this vaccine. · There is no evidence that PPSV is harmful to either a pregnant woman or to her fetus. However, as a precaution, women who need the vaccine should be vaccinated before becoming pregnant, if possible. Risks of a vaccine reaction  With any medicine, including vaccines, there is a chance of side effects. These are usually mild and go away on their own, but serious reactions are also possible. About half of people who get PPSV have mild side effects, such as redness or pain where the shot is given, which go away within about two days. Less than 1 out of 100 people develop a fever, muscle aches, or more severe local reactions.   Problems that could happen after any vaccine:  · People sometimes faint after a medical procedure, including vaccination. Sitting or lying down for about 15 minutes can help prevent fainting, and injuries caused by a fall. Tell your doctor if you feel dizzy, or have vision changes or ringing in the ears. · Some people get severe pain in the shoulder and have difficulty moving the arm where a shot was given. This happens very rarely. · Any medication can cause a severe allergic reaction. Such reactions from a vaccine are very rare, estimated at about 1 in a million doses, and would happen within a few minutes to a few hours after the vaccination. As with any medicine, there is a very remote chance of a vaccine causing a serious injury or death. The safety of vaccines is always being monitored. For more information, visit: www.cdc.gov/vaccinesafety/  What if there is a serious reaction? What should I look for? Look for anything that concerns you, such as signs of a severe allergic reaction, very high fever, or unusual behavior. Signs of a severe allergic reaction can include hives, swelling of the face and throat, difficulty breathing, a fast heartbeat, dizziness, and weakness. These would usually start a few minutes to a few hours after the vaccination. What should I do? If you think it is a severe allergic reaction or other emergency that can't wait, call 9-1-1 or get to the nearest hospital. Otherwise, call your doctor. Afterward, the reaction should be reported to the Vaccine Adverse Event Reporting System (VAERS). Your doctor might file this report, or you can do it yourself through the VAERS web site at www.vaers. hhs.gov, or by calling 3-725.825.5616. VAERS does not give medical advice. How can I learn more? · Ask your doctor. He or she can give you the vaccine package insert or suggest other sources of information. · Call your local or state health department.   · Contact the Centers for Disease Control and Prevention (CDC):  ¨ Call 4-785.358.9427 (6-424-BAF-INFO) or  ¨ Visit CDC's website at www.cdc.gov/vaccines  Vaccine Information Statement  PPSV Vaccine  (04/24/2015)  Department of Health and Human Services  Centers for Disease Control and Prevention  Many Vaccine Information Statements are available in Frisian and other languages. See www.immunize.org/vis. Hojas de información Sobre Vacunas están disponibles en español y en muchos otros idiomas. Visite Sage.si. Care instructions adapted under license by [employer] (which disclaims liability or warranty for this information). If you have questions about a medical condition or this instruction, always ask your healthcare professional. [address] any warranty or liability for your use of this information.

## 2018-10-15 ENCOUNTER — TELEPHONE (OUTPATIENT)
Dept: FAMILY MEDICINE CLINIC | Age: 63
End: 2018-10-15

## 2018-10-17 ENCOUNTER — DOCUMENTATION ONLY (OUTPATIENT)
Dept: SURGERY | Age: 63
End: 2018-10-17

## 2018-10-17 NOTE — PROGRESS NOTES
Per Aure TADEO at [facility] does not have the rider. Reference # member I.D and today's date. Gave patient self pay rate but patient decided to cancel appt.

## 2018-10-19 NOTE — TELEPHONE ENCOUNTER
Called patient had verify his  asked if there was another procedure he may be interested in he states his insurance does not cover any bariatric surgeries at all but if he could get into a clinic that will set up a payment plan where he can pay out of pocket her would still consider told I will call around and let him know what I find out.

## 2018-10-22 NOTE — TELEPHONE ENCOUNTER
Thoughts:   1) sometimes insurance will pay for bariatric surgery when there are serious comorbid conditions, which he has. He should verify that with his insurance. 2) He should consider the non surgical weight loss clinic. Insurances have historically paid for the labs and office visits. While insurance does not pay for the meal replacements, my patients have told me that they end up not really paying anything more out of pocket since they then are not buying meals/groceries during the time they are on the meal replacements.     ANA LILIA

## 2018-10-25 ENCOUNTER — DOCUMENTATION ONLY (OUTPATIENT)
Dept: FAMILY MEDICINE CLINIC | Age: 63
End: 2018-10-25

## 2018-10-25 NOTE — PROGRESS NOTES
Pt was on schedule to see Dr. Demond Lewis today. Came into office with his wife. When he came into the office, he said \"I think I had a stroke\". He said that he had been seen in [facility] ER last night, but was given \"valium and atarax\". He reports having left sided weakness and dizziness. After consulting Dr. Demond Lewis, pt was advised to go to ER. He did not want to go back to [facility]. Advised to go to [facility]. Pt expressed clear understanding and had no further questions. Jewels Sanderson and spoke to [employer] to give report. They took down pt's info and reason for coming in.

## 2018-10-29 ENCOUNTER — OFFICE VISIT (OUTPATIENT)
Dept: FAMILY MEDICINE CLINIC | Age: 63
End: 2018-10-29

## 2018-10-29 ENCOUNTER — TELEPHONE (OUTPATIENT)
Dept: FAMILY MEDICINE CLINIC | Age: 63
End: 2018-10-29

## 2018-10-29 VITALS
RESPIRATION RATE: 18 BRPM | HEART RATE: 90 BPM | BODY MASS INDEX: 35.92 KG/M2 | DIASTOLIC BLOOD PRESSURE: 78 MMHG | WEIGHT: 295 LBS | TEMPERATURE: 97.9 F | OXYGEN SATURATION: 94 % | SYSTOLIC BLOOD PRESSURE: 150 MMHG | HEIGHT: 76 IN

## 2018-10-29 DIAGNOSIS — I10 ESSENTIAL HYPERTENSION: ICD-10-CM

## 2018-10-29 DIAGNOSIS — G81.94 LEFT HEMIPARESIS (HCC): ICD-10-CM

## 2018-10-29 DIAGNOSIS — I63.9 CEREBROVASCULAR ACCIDENT (CVA) INVOLVING RIGHT CEREBRAL HEMISPHERE (HCC): ICD-10-CM

## 2018-10-29 DIAGNOSIS — Z09 HOSPITAL DISCHARGE FOLLOW-UP: Primary | ICD-10-CM

## 2018-10-29 RX ORDER — ASPIRIN 325 MG
325 TABLET, DELAYED RELEASE (ENTERIC COATED) ORAL
COMMUNITY
Start: 2018-10-27 | End: 2018-11-16 | Stop reason: ALTCHOICE

## 2018-10-29 RX ORDER — ATORVASTATIN CALCIUM 40 MG/1
40 TABLET, FILM COATED ORAL DAILY
COMMUNITY
Start: 2018-10-27 | End: 2019-03-21 | Stop reason: SDUPTHER

## 2018-10-29 RX ORDER — AMLODIPINE BESYLATE 2.5 MG/1
2.5 TABLET ORAL DAILY
COMMUNITY
Start: 2018-10-27 | End: 2019-02-20 | Stop reason: SDUPTHER

## 2018-10-29 NOTE — PROGRESS NOTES
Tom Self. 65 y.o. male being seen for   Chief Complaint   Patient presents with   [address] Cerebrovascular Accident     f/u   [facility] Follow Up     the patient states that he feels unstable when he walks and stands feels maybe light headed  Tiredness more then normal   No straighth in left leg     1. Have you been to the ER, urgent care clinic since your last visit? Hospitalized since your last visit? Yes When: 10/25/18 Where: [facility]  Reason for visit: CVA . 2. Have you seen or consulted any other health care providers outside of the [facility] since your last visit? Include any pap smears or colon screening.  no    Pharmacy has been verified  Care team has been verified/updated

## 2018-10-29 NOTE — PROGRESS NOTES
Nneka Fowler is a 65 y.o. male who was seen in clinic today (10/29/2018). Assessment & Plan:       ICD-10-CM ICD-9-CM    1. Hospital discharge follow-up Z09 V67.59    2. Cerebrovascular accident (CVA) involving right cerebral hemisphere ([address] I63.9 434.91    3. Left hemiparesis (HCC) G81.94 342.90    4. Essential hypertension I10 401.9      Clinically stable. Continue ASA, statin  Asking my staff to follow up with [facility] for details on neuro group involved with his care to see in follow up. Given numbers to schedule PT already ordered. Large delta in BP vs hospitalization. Keep meds unchanged today. Plan intensify next visit. Congratulated on proceeding with pneumovax, asked to provide documentation. Agreed to flu vaccine this season, but not today. Follow-up Disposition:  Return in about 1 week (around 11/5/2018) for stroke follow up, (30), no labs prior. Subjective:   Nneka Fowler was seen today for Cerebrovascular Accident (f/u) and Hospital Follow Up      Transition Care Management:    Admission: 10/25/2018  Discharge: 10/27/2018  Location: [facility]  Diagnosis: CVA  Nurse Navigator note:NA    We reviewed the discharge summary. He presented with left-sided weakness, originally seen in an [facility] ER 10/24/2018 with dizziness, ataxia, nausea and mild headache,  Discharged assessed as peripheral vertigo based on the negative CT scan, and then sent by me the following day, when he presented to my office with left sided weakness, to [facility] ER for stroke evaluation. Left-sided weakness confirmed, worse in the lower extremity than the upper extremity with no cranial nerve deficit. Initial CT scan was suggestive of possible right MCA stenosis. Subsequent MRI of the brain confirmed an acute infarct in the right side of the anterior choroidal artery distribution. Echocardiogram was unremarkable.   Discharged with recommendations to follow up with me and neurology with outpatient PT    Meds added:  Atorvastatin 40 mg   Amlodipine 2.5 mg    Meds changed:  Aspirin 325 mg     He is taking his medications as directed & without side effects. Neither neuro appointment nor PT have been scheduled. SBP 190s during hospitalization  Discharge /77     Still with left arm and leg weakness    \"Feeling not my normal self. \" Clarifies not lightheaded, but easily fatigued. Reports had pneumovax at the [place] recently. Outpatient Medications Marked as Taking for the 10/29/18 encounter (Office Visit) with Gt Glaser MD   Medication Sig Dispense Refill    aspirin delayed-release 325 mg tablet Take 325 mg by mouth.  amLODIPine (NORVASC) 2.5 mg tablet Take 2.5 mg by mouth daily.  atorvastatin (LIPITOR) 40 mg tablet Take 40 mg by mouth daily.  dulaglutide (TRULICITY) 1.5 ER/2.9 mL sub-q pen 0.5 mL by SubCUTAneous route every seven (7) days. 12 Pen 0    ergocalciferol (VITAMIN D2) 50,000 unit capsule Take 1 Cap by mouth two (2) times a week. Start with 1 cap daily for 3 days 40 Cap 1    insulin glargine (LANTUS SOLOSTAR U-100 INSULIN) 100 unit/mL (3 mL) inpn 15 Units by SubCUTAneous route daily. 5 Pen 0    valsartan (DIOVAN) 80 mg tablet Take 1 Tab by mouth daily.  90 Tab 4    metFORMIN (GLUCOPHAGE) 1,000 mg tablet TAKE ONE TABLET BY MOUTH TWICE DAILY WITH MEALS 180 Tab 3           Patient Active Problem List    Diagnosis    Cerebrovascular accident (CVA) involving right cerebral hemisphere [facility])     [facility] 10/25/2018: right anterior choroidal artery, LLE>LUE weakness, no cranial nerve involvement      Left hemiparesis ([address]    Diabetic macular edema ([address]     OD 4/19/2018 Lucas Hebert MD      NAFLD (nonalcoholic fatty liver disease)      4/2018 [facility]. Mean liver stiffness value 1.31 (normal to mild stiffness)      History of pancreatitis     2012, 4/2018: no clear trigger. Lipase 682 on admission. biliary sludge without stones      Type 2 diabetes with nephropathy (HCC)    Severe obesity (BMI 35.0-39. 9) with comorbidity ([address]    CHELE on CPAP    Microalbuminuria    Prostate cancer [facility]    Type 2 diabetes mellitus with both eyes affected by proliferative retinopathy and macular edema, with long-term current use of insulin ([facility]     4/19/2018 Adriana Conteh MD      Essential hypertension    Elevated cholesterol with high triglycerides    Obesity    Elevated PSA         Allergies   Allergen Reactions    Byetta [Exenatide] Other (comments)     Pancreatits    Lisinopril Cough         Patient Care Team:  Thelma Tai MD as PCP - General (Family Practice)  Rudy Thornton NP as Physician (Nurse Practitioner)  Edenilson Brown MD (Ophthalmology)    The following sections were reviewed & updated as appropriate: PMH, PSH, FH, and SH. Review of Systems   Musculoskeletal: Negative for myalgias. Neurological: Negative for dizziness, sensory change, speech change and headaches. Objective:     Visit Vitals  /78   Pulse 90   Temp 97.9 °F (36.6 °C) (Oral)   Resp 18   Ht 6' 4\" (1.93 m)   Wt 295 lb (133.8 kg)   SpO2 94%   BMI 35.91 kg/m²      Physical Exam   Constitutional: He is oriented to person, place, and time. He appears well-developed and well-nourished. No distress. Pleasant obese black male   HENT:   Head: Normocephalic and atraumatic. Neck: Neck supple. Cardiovascular: Normal rate, regular rhythm and normal heart sounds. Exam reveals no gallop and no friction rub. No murmur heard. Pulmonary/Chest: Effort normal and breath sounds normal. No respiratory distress. He has no wheezes. He has no rhonchi. He has no rales. Musculoskeletal: He exhibits no edema. Right hand: Normal strength noted. He exhibits no finger abduction, no thumb/finger opposition and no wrist extension trouble. Left hand: Decreased strength noted.  He exhibits finger abduction and wrist extension trouble. He exhibits no thumb/finger opposition. Neurological: He is alert and oriented to person, place, and time. No cranial nerve deficit. Decreased strength LLE manifested as mild limp. No foot drop with short distance ambulation. Skin: Skin is warm and dry. Psychiatric: He has a normal mood and affect. His behavior is normal. Judgment and thought content normal.         Disclaimer: The patient understands our medical plan. Alternatives have been explained and offered. The risks, benefits and significant side effects of all medications have been reviewed. Anticipated time course and progression of condition reviewed. All questions have been addressed. He is encouraged to employ the information provided in the after visit summary, which was reviewed. Where appropriate, he is instructed to call the clinic if he has not been notified either by phone or through [address] with the results of his tests or with an appointment plan for any referrals within 1 week(s). No news is not good news; it's no news. The patient  is to call if his condition worsens or fails to improve or if significant side effects are experienced. Aspects of this note may have been generated using voice recognition software. Despite editing, there may be unrecognized errors.        David Moran MD

## 2018-10-29 NOTE — PATIENT INSTRUCTIONS
Pending Review   Physical Therapy Diagnoses    Cerebrovascular accident (CVA) due to occlusion of right middle cerebral artery (CMS/HCC)     Claribel Gonzalez MD    [address], [address]   Phone: [phone]    Fax: [phone]

## 2018-10-29 NOTE — Clinical Note
Told to follow up with neurologist following CVA post discharge. I don't see appointment data. This was news to them. Please call [place] to find out which neuro group consulted on him/cared for him and has access to the studies done.  ANA LILIA

## 2018-10-29 NOTE — TELEPHONE ENCOUNTER
Late entry from this morning:    Patient's wife called the office stating patient has been discharged form [facility] but she feels like he was sent home to soon, she also states she feels patient was not given proper care due to lack his insurance preference, she states patient seem to be getting worse, she states he was holding on to walls more with walking. Asked what had patient been admitted for she states it was found that he had had a mild stroke. She was placed on hold while Dr. Loletta Felty was consulted she was then told he needs to be seen back at the ER if his symptoms are getting worse or are not improving. Mrs. Randi Cisneros states she did not want to take him back to ER she felt as though he had been failed by [facility]zym[facility]ki  as well as [facility], told she should take him to an ER even if it is neither [facility] or [place]. Mrs. Randi Cisneros then went on to say she really did not want to take him to an ER she was told this was advised by Dr. Loletta Felty but I will get a nurse on the phone who can further advise.

## 2018-11-01 ENCOUNTER — TELEPHONE (OUTPATIENT)
Dept: FAMILY MEDICINE CLINIC | Age: 63
End: 2018-11-01

## 2018-11-01 NOTE — TELEPHONE ENCOUNTER
Patients states he was referred to a neurologist, has not heard from anyone yet. Please contact patient at [phone].

## 2018-11-02 NOTE — TELEPHONE ENCOUNTER
Patient call back today 11/02/18 stating that he is not having any luck finding a Neurologist, and would like for the nurse to call him back ASAP. Please contact patient at [phone].

## 2018-11-16 ENCOUNTER — OFFICE VISIT (OUTPATIENT)
Dept: FAMILY MEDICINE CLINIC | Age: 63
End: 2018-11-16

## 2018-11-16 VITALS
SYSTOLIC BLOOD PRESSURE: 142 MMHG | RESPIRATION RATE: 14 BRPM | HEIGHT: 76 IN | OXYGEN SATURATION: 99 % | WEIGHT: 294 LBS | BODY MASS INDEX: 35.8 KG/M2 | HEART RATE: 71 BPM | DIASTOLIC BLOOD PRESSURE: 80 MMHG

## 2018-11-16 DIAGNOSIS — E11.21 DIABETIC NEPHROPATHY ASSOCIATED WITH TYPE 2 DIABETES MELLITUS (HCC): ICD-10-CM

## 2018-11-16 DIAGNOSIS — E55.9 VITAMIN D DEFICIENCY: ICD-10-CM

## 2018-11-16 DIAGNOSIS — I10 ESSENTIAL HYPERTENSION: ICD-10-CM

## 2018-11-16 DIAGNOSIS — E66.01 SEVERE OBESITY (BMI 35.0-39.9) WITH COMORBIDITY (HCC): ICD-10-CM

## 2018-11-16 DIAGNOSIS — Z79.4 TYPE 2 DIABETES MELLITUS WITH BOTH EYES AFFECTED BY PROLIFERATIVE RETINOPATHY AND MACULAR EDEMA, WITH LONG-TERM CURRENT USE OF INSULIN (HCC): Primary | ICD-10-CM

## 2018-11-16 DIAGNOSIS — E11.3513 TYPE 2 DIABETES MELLITUS WITH BOTH EYES AFFECTED BY PROLIFERATIVE RETINOPATHY AND MACULAR EDEMA, WITH LONG-TERM CURRENT USE OF INSULIN (HCC): Primary | ICD-10-CM

## 2018-11-16 DIAGNOSIS — E11.21 TYPE 2 DIABETES WITH NEPHROPATHY (HCC): ICD-10-CM

## 2018-11-16 DIAGNOSIS — I63.9 CEREBROVASCULAR ACCIDENT (CVA) INVOLVING RIGHT CEREBRAL HEMISPHERE (HCC): ICD-10-CM

## 2018-11-16 DIAGNOSIS — N52.9 VASCULOGENIC ERECTILE DYSFUNCTION, UNSPECIFIED VASCULOGENIC ERECTILE DYSFUNCTION TYPE: ICD-10-CM

## 2018-11-16 RX ORDER — FUROSEMIDE 20 MG/1
20 TABLET ORAL
COMMUNITY
End: 2019-09-12 | Stop reason: DRUGHIGH

## 2018-11-16 RX ORDER — CLOPIDOGREL BISULFATE 75 MG/1
75 TABLET ORAL
COMMUNITY
Start: 2018-11-05 | End: 2019-07-11 | Stop reason: SDUPTHER

## 2018-11-16 RX ORDER — VALSARTAN 80 MG/1
80 TABLET ORAL 2 TIMES DAILY
Qty: 30 TAB | Refills: 0
Start: 2018-11-16 | End: 2018-12-10

## 2018-11-16 RX ORDER — INSULIN GLARGINE 100 [IU]/ML
35 INJECTION, SOLUTION SUBCUTANEOUS DAILY
Qty: 5 PEN | Refills: 0
Start: 2018-11-16

## 2018-11-16 RX ORDER — INSULIN ASPART 100 [IU]/ML
12 INJECTION, SOLUTION INTRAVENOUS; SUBCUTANEOUS
COMMUNITY

## 2018-11-16 RX ORDER — ERGOCALCIFEROL 1.25 MG/1
50000 CAPSULE ORAL 2 TIMES WEEKLY
Qty: 40 CAP | Refills: 1 | Status: SHIPPED | OUTPATIENT
Start: 2018-11-16

## 2018-11-16 NOTE — PROGRESS NOTES
Chief Complaint   Patient presents with    Diabetes     1. Follow up diabetes  2. Pt states that he is out of his Vitamin D.     1. Have you been to the ER, urgent care clinic since your last visit? Hospitalized since your last visit? No    2. Have you seen or consulted any other health care providers outside of the [address] [place] [address] since your last visit? Include any pap smears or colon screening.  No

## 2018-11-16 NOTE — PROGRESS NOTES
Katherine Salazar. is a 65 y.o. male who was seen in clinic today (11/16/2018). Assessment & Plan:       ICD-10-CM ICD-9-CM    1. Type 2 diabetes mellitus with both eyes affected by proliferative retinopathy and macular edema, with long-term current use of insulin ([facility] E11.3513 250.50     Z79.4 362.02      V58.67      362.07    2. Diabetic nephropathy associated with type 2 diabetes mellitus (HCC) E11.21 250.40 valsartan (DIOVAN) 80 mg tablet     759.81 METABOLIC PANEL, BASIC      URINALYSIS W/ RFLX MICROSCOPIC      MICROALBUMIN, UR, RAND W/ MICROALB/CREAT RATIO   3. Type 2 diabetes with nephropathy (HCC) E11.21 250.40 insulin aspart U-100 (NOVOLOG FLEXPEN U-100 INSULIN) 100 unit/mL inpn     583.81 insulin glargine (LANTUS SOLOSTAR U-100 INSULIN) 100 unit/mL (3 mL) inpn   4. Essential hypertension I10 401.9 valsartan (DIOVAN) 80 mg tablet      METABOLIC PANEL, BASIC      URINALYSIS W/ RFLX MICROSCOPIC      MICROALBUMIN, UR, RAND W/ MICROALB/CREAT RATIO   5. Vitamin D deficiency E55.9 268.9 ergocalciferol (VITAMIN D2) 50,000 unit capsule   6. Cerebrovascular accident (CVA) involving right cerebral hemisphere (HCC) I63.9 434.91 clopidogrel (PLAVIX) 75 mg tab   7. Severe obesity (BMI 35.0-39. 9) with comorbidity ([facility] E66.01 278.01    8. Vasculogenic erectile dysfunction, unspecified vasculogenic erectile dysfunction type N52.9 607.84      DM2: glucose control improving. Continue meds unchanged. No change in weight yet. Take serious look at diet. Discussed strategies for Thanksgiving. BP uncontrolled. Continue CCB unchanged for now. Double ARB in deference to DM nephropathy. Has plenty. Short interval follow up. Expect temporary orthostatic symptoms. Slowly recovering from his CVA. Wake up call to vascular disease affecting other end organs as well: eyes, kidneys. ..  ED presumed vasculogenic.  Address underlying factors (DM2, hypertension, lipids)      Follow-up Disposition:  Return in about 3 weeks (around 2018) for blood pressure and kidney follow up, non fasting labs 1 week prior, (30). Subjective:   Taylor Triplett. was seen today for Diabetes      Reviewed office notes dated 2018 Rosita Esqueda MD, neurologist with [facility]. Right MCA territory stroke suspected due to large artery intracranial atherosclerosis. Plavix initiated. Aspirin discontinued. Mediterranean diet. Recommended to follow-up 1 month. PT in place. Slowly regaining his strength. Follow up DM2 with retinopathy and nephropathy: On metformin, dulaglutide and insulin. No trouble with doubling GLP1 last visit    Home glucoses:  Fastin-130s  PP lunch: 140  PP dinner: NA  Hypoglycemic episodes: none    Nutrition through [place] 1 year ago    Follow-up vitamin D deficiency on replacement    Notes that his Viagra is less effective for ED. Options? Lab Results   Component Value Date/Time    Hemoglobin A1c 8.2 (H) 10/02/2018 12:00 PM    Hemoglobin A1c 7.8 (H) 2018 02:59 PM    Hemoglobin A1c 11.0 (H) 2012 11:01 AM    Hemoglobin A1c 7.0 (H) 2011 08:22 AM    Glucose 188 (H) 10/02/2018 12:00 PM    Glucose (POC) 293 (H) 2010 08:32 AM    Glucose,  (H) 2010 10:23 AM    Microalb/Creat ratio (ug/mg creat.) 1,414.9 (H) 10/02/2018 12:00 PM    LDL, calculated 125 (H) 2018 02:59 PM    Creatinine 0.8 10/02/2018 12:00 PM     Lab Results   Component Value Date/Time    VITAMIN D, 25-HYDROXY 23.9 (L) 10/02/2018 12:00 PM           Results for orders placed or performed in visit on /60   METABOLIC PANEL, COMPREHENSIVE   Result Value Ref Range    Glucose 188 (H) 70 - 99 mg/dL    BUN 12 6 - 22 mg/dL    Creatinine 0.8 0.8 - 1.6 mg/dL    Sodium 139 133 - 145 mmol/L    Potassium 4.5 3.5 - 5.5 mmol/L    Chloride 98 98 - 110 mmol/L    CO2 26 20 - 32 mmol/L    AST (SGOT) 19 10 - 37 U/L    ALT (SGPT) 23 5 - 40 U/L    Alk.  phosphatase 50 40 - 125 U/L    Bilirubin, total 0.3 0.2 - 1.2 mg/dL    Calcium 9.3 8.4 - 10.4 mg/dL    Protein, total 6.5 6.2 - 8.1 g/dL    Albumin 3.9 3.5 - 5.0 g/dL    A-G Ratio 1.5 1.1 - 2.6 ratio    Globulin 2.6 2.0 - 4.0 g/dL    Anion gap 15.0 mmol/L    GFRAA >60.0 >60.0    GFRNA >60.0 >60.0   FERRITIN   Result Value Ref Range    Ferritin 239 22 - 322 ng/mL   IRON PROFILE   Result Value Ref Range    Iron 77 45 - 160 mcg/dL    UIBC 207 110 - 370 mcg/dL    TIBC 284 228 - 428 mcg/dL    Iron % saturation 27 20 - 50 %   LIPASE   Result Value Ref Range    Lipase 32 7 - 60 U/L   VITAMIN D, 25 HYDROXY   Result Value Ref Range    VITAMIN D, 25-HYDROXY 23.9 (L) 32.0 - 100.0 ng/mL   AMYLASE ISOENZYMES   Result Value Ref Range    AMYLASE PANCREATIC FRACTION 28 10 - 53 U/L    AMYLASE SALIVARY 30 11 - 83 U/L    AMYLASE TOTAL ISOENZYME 58 31 - 124 U/L   CBC WITH AUTOMATED DIFF   Result Value Ref Range    WBC 6.8 4.0 - 11.0 K/uL    RBC 4.69 3.80 - 5.80 M/uL    HGB 11.2 (L) 13.1 - 17.2 g/dL    HCT 38.4 (L) 39.3 - 51.6 %    MCV 82 80 - 95 fL    MCH 24 (L) 26 - 34 pg    MCHC 29 (L) 31 - 36 g/dL    RDW 13.8 10.0 - 15.5 %    PLATELET 983 006 - 624 K/uL    MPV 11.2 9.0 - 13.0 fL    NEUTROPHILS 67 40 - 75 %    Lymphocytes 23 20 - 45 %    MONOCYTES 7 3 - 12 %    EOSINOPHILS 2 0 - 6 %    BASOPHILS 1 0 - 2 %    ABS. NEUTROPHILS 4.5 1.8 - 7.7 K/uL    ABSOLUTE LYMPHOCYTE COUNT 1.6 1.0 - 4.8 K/uL    ABS. MONOCYTES 0.5 0.1 - 1.0 K/uL    ABS. EOSINOPHILS 0.2 0.0 - 0.5 K/uL    ABS.  BASOPHILS 0.0 0.0 - 0.2 K/uL   HEMOGLOBIN A1C W/O EAG   Result Value Ref Range    Hemoglobin A1c 8.2 (H) 4.8 - 5.9 %    AVG  (H) 91 - 123 mg/dL   MICROALBUMIN, UR, RAND   Result Value Ref Range    Creatinine, urine 146 mg/dL    Microalbumin, urine 2,065.7 (H) 0.1 - 17.0 mcg/mL    Microalb/Creat ratio (ug/mg creat.) 1,414.9 (H) 0.0 - 30.0 mcg/mg of Creatinine        Outpatient Medications Marked as Taking for the 11/16/18 encounter (Office Visit) with Rj Jones MD   Medication Sig Dispense Refill    clopidogrel (PLAVIX) 75 mg tab 75 mg.  furosemide (LASIX) 20 mg tablet Take 20 mg by mouth.  insulin aspart U-100 (NOVOLOG FLEXPEN U-100 INSULIN) 100 unit/mL inpn 10 Units by SubCUTAneous route.  amLODIPine (NORVASC) 2.5 mg tablet Take 2.5 mg by mouth daily.  atorvastatin (LIPITOR) 40 mg tablet Take 40 mg by mouth daily.  dulaglutide (TRULICITY) 1.5 GP/6.6 mL sub-q pen 0.5 mL by SubCUTAneous route every seven (7) days. 12 Pen 0    ergocalciferol (VITAMIN D2) 50,000 unit capsule Take 1 Cap by mouth two (2) times a week. Start with 1 cap daily for 3 days 40 Cap 1    insulin glargine (LANTUS SOLOSTAR U-100 INSULIN) 100 unit/mL (3 mL) inpn 15 Units by SubCUTAneous route daily. 5 Pen 0    valsartan (DIOVAN) 80 mg tablet Take 1 Tab by mouth daily. 90 Tab 4    sildenafil citrate (VIAGRA) 50 mg tablet 50 mg.      metFORMIN (GLUCOPHAGE) 1,000 mg tablet TAKE ONE TABLET BY MOUTH TWICE DAILY WITH MEALS 180 Tab 3       Patient Active Problem List    Diagnosis    Vitamin D deficiency    Diabetic nephropathy associated with type 2 diabetes mellitus ([address]    Cerebrovascular accident (CVA) involving right cerebral hemisphere [facility] 10/25/2018: right MCA territory, right anterior choroidal artery, LLE>LUE weakness, no cranial nerve involvement. 11/5/2018: Kleber Zhu MD: stop ASA, add plavix      Left hemiparesis ([address]    Diabetic macular edema ([address]     OD 4/19/2018 Ashlie Reyes MD      NAFLD (nonalcoholic fatty liver disease)     US 4/2018 [facility]. Mean liver stiffness value 1.31 (normal to mild stiffness)      History of pancreatitis     2012, 4/2018: no clear trigger. Lipase 682 on admission. biliary sludge without stones      Type 2 diabetes with nephropathy (HCC)    Severe obesity (BMI 35.0-39. 9) with comorbidity ([address]    CHELE on CPAP    Microalbuminuria    Prostate cancer (HCC)    Type 2 diabetes mellitus with both eyes affected by proliferative retinopathy and macular edema, with long-term current use of insulin ([facility]     4/19/2018 Charles Ballesteros MD      Essential hypertension    Elevated cholesterol with high triglycerides    Obesity    Elevated PSA         Allergies   Allergen Reactions    Byetta [Exenatide] Other (comments)     Pancreatits    Lisinopril Cough         Patient Care Team:  Wendy Salinas MD as PCP - General (Family Practice)  Dayna Puga NP as Physician (Nurse Practitioner)  João Granda MD (Ophthalmology)    The following sections were reviewed & updated as appropriate: PMH, PSH, FH, and SH. Review of Systems   Respiratory: Negative for shortness of breath. Cardiovascular: Negative for chest pain. Gastrointestinal: Negative for abdominal pain, nausea and vomiting. Neurological: Negative for dizziness and headaches. Objective:     Visit Vitals  /80   Pulse 71   Resp 14   Ht 6' 4\" (1.93 m)   Wt 294 lb (133.4 kg)   SpO2 99%   BMI 35.79 kg/m²      Physical Exam   Constitutional: He is oriented to person, place, and time. He appears well-developed. No distress. Pleasant obese black male   HENT:   Head: Normocephalic and atraumatic. Pulmonary/Chest: Effort normal.   Neurological: He is alert and oriented to person, place, and time. Psychiatric: He has a normal mood and affect. His behavior is normal. Judgment and thought content normal.         Disclaimer: The patient understands our medical plan. Alternatives have been explained and offered. The risks, benefits and significant side effects of all medications have been reviewed. Anticipated time course and progression of condition reviewed. All questions have been addressed. He is encouraged to employ the information provided in the after visit summary, which was reviewed.       Where appropriate, he is instructed to call the clinic if he has not been notified either by phone or through [address] with the results of his tests or with an appointment plan for any referrals within 1 week(s). No news is not good news; it's no news. The patient  is to call if his condition worsens or fails to improve or if significant side effects are experienced. Aspects of this note may have been generated using voice recognition software. Despite editing, there may be unrecognized errors.        Mark Delatorre MD

## 2018-12-04 ENCOUNTER — LAB ONLY (OUTPATIENT)
Dept: FAMILY MEDICINE CLINIC | Age: 63
End: 2018-12-04

## 2018-12-04 DIAGNOSIS — I10 ESSENTIAL HYPERTENSION: Primary | ICD-10-CM

## 2018-12-04 DIAGNOSIS — E11.21 TYPE 2 DIABETES WITH NEPHROPATHY (HCC): ICD-10-CM

## 2018-12-04 NOTE — PROGRESS NOTES
Susy Perkins. 59 y.o. male had blood work done in right arm   Questions / concerns answered   No reaction noted, tolerated well

## 2018-12-05 LAB
ANION GAP SERPL CALC-SCNC: 14 MMOL/L
BILIRUB UR QL: NEGATIVE
BUN SERPL-MCNC: 12 MG/DL (ref 6–22)
CALCIUM SERPL-MCNC: 9.1 MG/DL (ref 8.4–10.4)
CHLORIDE SERPL-SCNC: 101 MMOL/L (ref 98–110)
CO2 SERPL-SCNC: 25 MMOL/L (ref 20–32)
CREAT SERPL-MCNC: 0.9 MG/DL (ref 0.8–1.6)
CREATININE, URINE: 241 MG/DL
GFRAA, 66117: >60
GFRNA, 66118: >60
GLUCOSE SERPL-MCNC: 184 MG/DL (ref 70–99)
GLUCOSE UR QL: ABNORMAL MG/DL
HGB UR QL STRIP: NEGATIVE
HYLINE CAST, 6014: ABNORMAL /LPF
KETONES UR QL STRIP.AUTO: NEGATIVE MG/DL
LEUKOCYTE ESTERASE: NEGATIVE
MICROALB/CREAT RATIO, 140286: 1438.7 MCG/MG OF CREATININE (ref 0–30)
MICROALBUMIN,URINE RANDOM 140054: 3467.3 MCG/ML (ref 0.1–17)
NITRITE UR QL STRIP.AUTO: NEGATIVE
PH UR STRIP: 5 PH (ref 5–8)
POTASSIUM SERPL-SCNC: 4.2 MMOL/L (ref 3.5–5.5)
PROT UR QL STRIP: ABNORMAL MG/DL
RBC #/AREA URNS HPF: ABNORMAL /HPF
SODIUM SERPL-SCNC: 140 MMOL/L (ref 133–145)
SP GR UR: 1.02 (ref 1–1.03)
UROBILINOGEN UR STRIP-MCNC: <2 MG/DL
WBC URNS QL MICRO: ABNORMAL /HPF (ref 0–2)

## 2018-12-10 ENCOUNTER — OFFICE VISIT (OUTPATIENT)
Dept: FAMILY MEDICINE CLINIC | Age: 63
End: 2018-12-10

## 2018-12-10 ENCOUNTER — TELEPHONE (OUTPATIENT)
Dept: FAMILY MEDICINE CLINIC | Age: 63
End: 2018-12-10

## 2018-12-10 VITALS
OXYGEN SATURATION: 96 % | TEMPERATURE: 97.8 F | WEIGHT: 297 LBS | RESPIRATION RATE: 14 BRPM | DIASTOLIC BLOOD PRESSURE: 90 MMHG | BODY MASS INDEX: 36.17 KG/M2 | HEIGHT: 76 IN | SYSTOLIC BLOOD PRESSURE: 150 MMHG | HEART RATE: 78 BPM

## 2018-12-10 DIAGNOSIS — E11.21 DIABETIC NEPHROPATHY ASSOCIATED WITH TYPE 2 DIABETES MELLITUS (HCC): Primary | ICD-10-CM

## 2018-12-10 DIAGNOSIS — I10 ESSENTIAL HYPERTENSION: ICD-10-CM

## 2018-12-10 DIAGNOSIS — Z79.4 TYPE 2 DIABETES MELLITUS WITH BOTH EYES AFFECTED BY PROLIFERATIVE RETINOPATHY AND MACULAR EDEMA, WITH LONG-TERM CURRENT USE OF INSULIN (HCC): ICD-10-CM

## 2018-12-10 DIAGNOSIS — C61 PROSTATE CANCER (HCC): ICD-10-CM

## 2018-12-10 DIAGNOSIS — E11.3513 TYPE 2 DIABETES MELLITUS WITH BOTH EYES AFFECTED BY PROLIFERATIVE RETINOPATHY AND MACULAR EDEMA, WITH LONG-TERM CURRENT USE OF INSULIN (HCC): ICD-10-CM

## 2018-12-10 RX ORDER — VALSARTAN 160 MG/1
160 TABLET ORAL 2 TIMES DAILY
Qty: 60 TAB | Refills: 1 | Status: SHIPPED | OUTPATIENT
Start: 2018-12-10 | End: 2018-12-10 | Stop reason: RX

## 2018-12-10 RX ORDER — LOSARTAN POTASSIUM 100 MG/1
100 TABLET ORAL DAILY
Qty: 30 TAB | Refills: 1 | Status: SHIPPED | OUTPATIENT
Start: 2018-12-10 | End: 2019-03-21 | Stop reason: SDUPTHER

## 2018-12-10 NOTE — TELEPHONE ENCOUNTER
Losartan 100 mg ONCE daily (stressed as he has been taking the other ARB twice daily). Thanks Esther Tolliver.  ANA LILIA

## 2018-12-10 NOTE — PROGRESS NOTES
Pablo Bynum. 59 y.o. male being seen for   Chief Complaint   Patient presents with    Hypertension     F/u      No concerns     1. Have you been to the ER, urgent care clinic since your last visit? Hospitalized since your last visit? No    2. Have you seen or consulted any other health care providers outside of the [address] [place] [address] since your last visit? Include any pap smears or colon screening.  No    Pharmacy has been verified  Care team has been verified/updated

## 2018-12-10 NOTE — PROGRESS NOTES
Live Santoyo is a 59 y.o. male who was seen in clinic today (12/10/2018). Assessment & Plan:       ICD-10-CM ICD-9-CM    1. Diabetic nephropathy associated with type 2 diabetes mellitus (HCC) E11.21 250.40 REFERRAL TO NEPHROLOGY     583.81 DISCONTINUED: valsartan (DIOVAN) 160 mg tablet   2. Essential hypertension K99 728.2 METABOLIC PANEL, BASIC      DISCONTINUED: valsartan (DIOVAN) 160 mg tablet   3. Type 2 diabetes mellitus with both eyes affected by proliferative retinopathy and macular edema, with long-term current use of insulin ([facility] E11.3513 250.50 HEMOGLOBIN A1C WITH EAG    Y65.7 096.96 METABOLIC PANEL, BASIC     V58.67      362.07    4. Prostate cancer ([facility].) C61 185      Gross proteinuria: Likely will be attributable to his DM 2 and hypertension. Referring for consideration for other possible etiologies and pertinent management thereof. Increasing ARB. Stressed need to achieve control of hypertension and DM2 to mitigate sequelae    Hypertension: Uncontrolled. Continue furosemide and amlodipine unchanged at present. Switched from valsartan to losartan after patient left office due to shortage. Increased to max dose. Anticipating other agents will also likely need to be adjusted. We will continue visits approximately every 2-3 weeks until control is achieved    Requesting colo report, hep c report    Appears to have been lost to follow up for prostate cancer. Will revisit next appointment. Follow-up Disposition:  Return in about 3 weeks (around 12/31/2018) for blood pressure follow up, non fasting labs 1 week prior. Subjective:   Live Santoyo was seen today for Hypertension (F/u )    Follow-up hypertension post recent right MCA territory CVA 10/24/2018: Previously uncontrolled. Continued CCB unchanged. Doubled ARB in deference to DM nephropathy. Also on furosemide Tolerated without side effects. Office notes from Delia Beach MD (neurology) reviewed.     Pertinent plan: Continue Plavix daily  Continue Lipitor  Goal BP less than 130/85    Follow-up DM2 with nephropathy.  Tolerating insulin, GLP-1 agonist and metformin without side effects      Lab Results   Component Value Date/Time    Hemoglobin A1c 8.2 (H) 10/02/2018 12:00 PM    Hemoglobin A1c 7.8 (H) 05/11/2018 02:59 PM    Hemoglobin A1c 11.0 (H) 04/26/2012 11:01 AM    Hemoglobin A1c 7.0 (H) 09/14/2011 08:22 AM    Glucose 184 (H) 12/04/2018 08:31 AM    Glucose (POC) 293 (H) 09/07/2010 08:32 AM    Glucose,  (H) 03/04/2010 10:23 AM    Microalb/Creat ratio (ug/mg creat.) 1,438.7 (H) 12/04/2018 08:31 AM    LDL, calculated 125 (H) 05/11/2018 02:59 PM    Creatinine 0.9 12/04/2018 08:31 AM         Results for orders placed or performed in visit on 62/39/96   METABOLIC PANEL, BASIC   Result Value Ref Range    Glucose 184 (H) 70 - 99 mg/dL    BUN 12 6 - 22 mg/dL    Creatinine 0.9 0.8 - 1.6 mg/dL    Sodium 140 133 - 145 mmol/L    Potassium 4.2 3.5 - 5.5 mmol/L    Chloride 101 98 - 110 mmol/L    CO2 25 20 - 32 mmol/L    Calcium 9.1 8.4 - 10.4 mg/dL    Anion gap 14.0 mmol/L    GFRAA >60.0 >60.0    GFRNA >60.0 >60.0   URINALYSIS W/ RFLX MICROSCOPIC   Result Value Ref Range    Specific Gravity 1.025 1.005 - 1.03    pH (UA) 5.0 5.0 - 8.0 pH    Protein >=500* (A) Negative, mg/dL    Glucose 50* (A) Negative mg/dL    Ketone Negative Negative mg/dL    Bilirubin Negative Negative    Blood Negative Negative    Nitrites Negative Negative    Leukocyte Esterase Negative Negative    Urobilinogen <2.0 <2.0 mg/dL    WBC 0-2 0 - 2 /hpf    RBC 0-2 Negative, /hpf    HYLINE CAST 0-2 (A) Negative /lpf   MICROALBUMIN, UR, RAND W/ MICROALB/CREAT RATIO   Result Value Ref Range    Creatinine, urine 241 mg/dL    Microalbumin, urine 3,467.3 (H) 0.1 - 17.0 mcg/mL    Microalb/Creat ratio (ug/mg creat.) 1,438.7 (H) 0.0 - 30.0 mcg/mg of Creatinine        Outpatient Medications Marked as Taking for the 12/10/18 encounter (Office Visit) with Fidelina Narayan MD Medication Sig Dispense Refill    clopidogrel (PLAVIX) 75 mg tab 75 mg.  furosemide (LASIX) 20 mg tablet Take 20 mg by mouth.  insulin aspart U-100 (NOVOLOG FLEXPEN U-100 INSULIN) 100 unit/mL inpn 10 Units by SubCUTAneous route.  insulin glargine (LANTUS SOLOSTAR U-100 INSULIN) 100 unit/mL (3 mL) inpn 35 Units by SubCUTAneous route daily. 5 Pen 0    valsartan (DIOVAN) 80 mg tablet Take 1 Tab by mouth two (2) times a day. 30 Tab 0    ergocalciferol (VITAMIN D2) 50,000 unit capsule Take 1 Cap by mouth two (2) times a week. 40 Cap 1    amLODIPine (NORVASC) 2.5 mg tablet Take 2.5 mg by mouth daily.  atorvastatin (LIPITOR) 40 mg tablet Take 40 mg by mouth daily.  dulaglutide (TRULICITY) 1.5 KT/6.5 mL sub-q pen 0.5 mL by SubCUTAneous route every seven (7) days. 12 Pen 0    sildenafil citrate (VIAGRA) 50 mg tablet 50 mg.      metFORMIN (GLUCOPHAGE) 1,000 mg tablet TAKE ONE TABLET BY MOUTH TWICE DAILY WITH MEALS 180 Tab 3       Patient Active Problem List    Diagnosis    Vitamin D deficiency    Diabetic nephropathy associated with type 2 diabetes mellitus ([address]    Vasculogenic erectile dysfunction    Cerebrovascular accident (CVA) involving right cerebral hemisphere [facility] 10/25/2018: right MCA territory, right anterior choroidal artery, LLE>LUE weakness, no cranial nerve involvement. 11/5/2018: Suzan Boyd MD: stop ASA, add plavix      Left hemiparesis ([address]    Diabetic macular edema ([address]     OD 4/19/2018 Keisha Patel MD      NAFLD (nonalcoholic fatty liver disease)     US 4/2018 [facility]. Mean liver stiffness value 1.31 (normal to mild stiffness)      History of pancreatitis     2012, 4/2018: no clear trigger. Lipase 682 on admission. biliary sludge without stones      Type 2 diabetes with nephropathy (HCC)    Severe obesity (BMI 35.0-39. 9) with comorbidity ([address]    CEHLE on CPAP    Microalbuminuria    Prostate cancer [facility])     Chart review: treated approx 2010 by Dr. Chasidy Dean, PSI in place. 8/15/2014 Mark Gorman MD \"Remains JASON s/p PSI. Patient will return to the office in one year for KELSEA with PSA 1-2 weeks prior if PSA today WNL. \"      Type 2 diabetes mellitus with both eyes affected by proliferative retinopathy and macular edema, with long-term current use of insulin ([address]     4/19/2018 Esther Kohler MD      Essential hypertension    Elevated cholesterol with high triglycerides    Obesity    Elevated PSA         Allergies   Allergen Reactions    Byetta [Exenatide] Other (comments)     Pancreatits    Lisinopril Cough         Patient Care Team:  Fidelina Narayan MD as PCP - General (Family Practice)  Rosi Gray NP as Physician (Nurse Practitioner)  Redd Knight MD (Ophthalmology)  Lesly Moran MD (Neurology)    The following sections were reviewed & updated as appropriate: PMH, PSH, FH, and SH. Review of Systems   Constitutional: Negative for malaise/fatigue. Respiratory: Negative for cough and shortness of breath. Cardiovascular: Negative for chest pain. Neurological: Negative for sensory change, speech change, focal weakness and headaches. Objective:     Visit Vitals  /90   Pulse 78   Temp 97.8 °F (36.6 °C)   Resp 14   Ht 6' 4\" (1.93 m)   Wt 297 lb (134.7 kg)   SpO2 96%   BMI 36.15 kg/m²      Physical Exam   Constitutional: He is oriented to person, place, and time. He appears well-developed. No distress. Pleasant obese black male   HENT:   Head: Normocephalic and atraumatic. Pulmonary/Chest: Effort normal.   Neurological: He is alert and oriented to person, place, and time. Psychiatric: He has a normal mood and affect. His behavior is normal. Judgment and thought content normal.         Disclaimer: The patient understands our medical plan. Alternatives have been explained and offered.   The risks, benefits and significant side effects of all medications have been reviewed. Anticipated time course and progression of condition reviewed. All questions have been addressed. He is encouraged to employ the information provided in the after visit summary, which was reviewed. Where appropriate, he is instructed to call the clinic if he has not been notified either by phone or through [address] with the results of his tests or with an appointment plan for any referrals within 1 week(s). No news is not good news; it's no news. The patient  is to call if his condition worsens or fails to improve or if significant side effects are experienced. Aspects of this note may have been generated using voice recognition software. Despite editing, there may be unrecognized errors.        Alma Rosa Escobar MD

## 2018-12-10 NOTE — TELEPHONE ENCOUNTER
Amado from [address] called stating that the Valsartan 160 mg is on 'long term back order' d/t the recall. They do not have any Valsartan in the pharmacy. He would like to know if something else can be called in? Please advise.

## 2018-12-10 NOTE — TELEPHONE ENCOUNTER
Called to speak with patient about med change. Left message for patient at home number requesting return call.

## 2018-12-12 NOTE — TELEPHONE ENCOUNTER
Pt was made aware of the medication switch. He will not longer take Valsartan 160 mg d/t it being on backorder. Losartan 100 mg was sent into the pharmacy. This will only be taken ONCE day. Pt repeated this back to me and expressed understanding.

## 2018-12-31 ENCOUNTER — LAB ONLY (OUTPATIENT)
Dept: FAMILY MEDICINE CLINIC | Age: 63
End: 2018-12-31

## 2018-12-31 DIAGNOSIS — E11.21 TYPE 2 DIABETES WITH NEPHROPATHY (HCC): Primary | ICD-10-CM

## 2018-12-31 NOTE — PROGRESS NOTES
Pt came in to have blood drawn. Name/ verified. Venipuncture performed on left arm. Pt tolerated it well.      Kale Rey LPN

## 2019-01-01 LAB
ANION GAP SERPL CALC-SCNC: 16 MMOL/L
AVG GLU, 10930: 142 MG/DL (ref 91–123)
BUN SERPL-MCNC: 12 MG/DL (ref 6–22)
CALCIUM SERPL-MCNC: 9.2 MG/DL (ref 8.4–10.4)
CHLORIDE SERPL-SCNC: 101 MMOL/L (ref 98–110)
CO2 SERPL-SCNC: 21 MMOL/L (ref 20–32)
CREAT SERPL-MCNC: 0.8 MG/DL (ref 0.8–1.6)
GFRAA, 66117: >60
GFRNA, 66118: >60
GLUCOSE SERPL-MCNC: 238 MG/DL (ref 70–99)
HBA1C MFR BLD HPLC: 6.6 % (ref 4.8–5.9)
POTASSIUM SERPL-SCNC: 4.5 MMOL/L (ref 3.5–5.5)
SODIUM SERPL-SCNC: 138 MMOL/L (ref 133–145)

## 2019-01-03 ENCOUNTER — TELEPHONE (OUTPATIENT)
Dept: FAMILY MEDICINE CLINIC | Age: 64
End: 2019-01-03

## 2019-01-03 NOTE — TELEPHONE ENCOUNTER
Amrita called Stating Patient needs Referral.Please look this Patient up not sure what I did with the Phone #

## 2019-01-10 DIAGNOSIS — I10 ESSENTIAL HYPERTENSION: ICD-10-CM

## 2019-01-10 DIAGNOSIS — E11.3513 TYPE 2 DIABETES MELLITUS WITH BOTH EYES AFFECTED BY PROLIFERATIVE RETINOPATHY AND MACULAR EDEMA, WITH LONG-TERM CURRENT USE OF INSULIN (HCC): ICD-10-CM

## 2019-01-10 DIAGNOSIS — Z79.4 TYPE 2 DIABETES MELLITUS WITH BOTH EYES AFFECTED BY PROLIFERATIVE RETINOPATHY AND MACULAR EDEMA, WITH LONG-TERM CURRENT USE OF INSULIN (HCC): ICD-10-CM

## 2019-02-17 DIAGNOSIS — E11.42 TYPE 2 DIABETES MELLITUS WITH DIABETIC POLYNEUROPATHY, WITH LONG-TERM CURRENT USE OF INSULIN (HCC): ICD-10-CM

## 2019-02-17 DIAGNOSIS — Z79.4 TYPE 2 DIABETES MELLITUS WITH DIABETIC POLYNEUROPATHY, WITH LONG-TERM CURRENT USE OF INSULIN (HCC): ICD-10-CM

## 2019-02-18 RX ORDER — DULAGLUTIDE 1.5 MG/.5ML
INJECTION, SOLUTION SUBCUTANEOUS
Refills: 0 | OUTPATIENT
Start: 2019-02-18

## 2019-02-18 NOTE — TELEPHONE ENCOUNTER
Automated refill request. Please schedule an appointment for reevaluation of DM2 (30). Interval supply will be authorized.  ANA LILIA

## 2019-02-20 NOTE — TELEPHONE ENCOUNTER
Called Vance Dumas.  1955 on 2019  Called to inform the patient that he needs to make an appt before any medication can be sent in. (after making an appt an interval supply can be called in if necessary)    Left message with office number requesting a call back.     SMD

## 2019-02-20 NOTE — TELEPHONE ENCOUNTER
Please review and sign if appropriate.      Medication pended     LOV: 1/10/2019    NOV: 2/26/2019    SMD

## 2019-02-20 NOTE — TELEPHONE ENCOUNTER
Patient called at 11:53 requesting a refill on his amLODIPine (NORVASC) 2.5 mg tablet. Patient was advise that he need to to schedule an appointment, but when I attempted to schedule him he said he had to go and would call the office back.

## 2019-02-22 RX ORDER — AMLODIPINE BESYLATE 2.5 MG/1
2.5 TABLET ORAL DAILY
Qty: 30 TAB | Refills: 0 | Status: SHIPPED | OUTPATIENT
Start: 2019-02-22 | End: 2019-03-21 | Stop reason: SDUPTHER

## 2019-02-27 NOTE — TELEPHONE ENCOUNTER
Patient called today at 10:55 requesting an Appointment, Patient has No showed his last two and cancelled one. Dr Lenny Joseph schedule is book ,and she will be out the week of 03/04/19. Please call patient to let him know of available appointment . [phone]

## 2019-02-28 NOTE — TELEPHONE ENCOUNTER
Called patient back had him verify his  he has been scheduled for DM f/u on 3/21/19 at 2:15 PM. He will need refills on his Trulicity.

## 2019-03-01 NOTE — TELEPHONE ENCOUNTER
Please make sure he has his Hep C, A1c and chemistry drawn prior to the visit (2 separate order dates).  ANA LILIA

## 2019-03-21 ENCOUNTER — OFFICE VISIT (OUTPATIENT)
Dept: FAMILY MEDICINE CLINIC | Age: 64
End: 2019-03-21

## 2019-03-21 VITALS
WEIGHT: 285 LBS | BODY MASS INDEX: 34.7 KG/M2 | SYSTOLIC BLOOD PRESSURE: 146 MMHG | HEART RATE: 87 BPM | RESPIRATION RATE: 14 BRPM | HEIGHT: 76 IN | DIASTOLIC BLOOD PRESSURE: 82 MMHG | OXYGEN SATURATION: 98 %

## 2019-03-21 DIAGNOSIS — Z79.4 TYPE 2 DIABETES MELLITUS WITH BOTH EYES AFFECTED BY PROLIFERATIVE RETINOPATHY AND MACULAR EDEMA, WITH LONG-TERM CURRENT USE OF INSULIN (HCC): Primary | ICD-10-CM

## 2019-03-21 DIAGNOSIS — E55.9 VITAMIN D DEFICIENCY: ICD-10-CM

## 2019-03-21 DIAGNOSIS — E11.311 DIABETIC MACULAR EDEMA (HCC): ICD-10-CM

## 2019-03-21 DIAGNOSIS — I10 ESSENTIAL HYPERTENSION: ICD-10-CM

## 2019-03-21 DIAGNOSIS — E11.21 DIABETIC NEPHROPATHY ASSOCIATED WITH TYPE 2 DIABETES MELLITUS (HCC): ICD-10-CM

## 2019-03-21 DIAGNOSIS — E11.3513 TYPE 2 DIABETES MELLITUS WITH BOTH EYES AFFECTED BY PROLIFERATIVE RETINOPATHY AND MACULAR EDEMA, WITH LONG-TERM CURRENT USE OF INSULIN (HCC): Primary | ICD-10-CM

## 2019-03-21 DIAGNOSIS — G81.94 LEFT HEMIPARESIS (HCC): ICD-10-CM

## 2019-03-21 DIAGNOSIS — I69.30 HISTORY OF CVA WITH RESIDUAL DEFICIT: ICD-10-CM

## 2019-03-21 DIAGNOSIS — E66.9 OBESITY (BMI 30.0-34.9): ICD-10-CM

## 2019-03-21 DIAGNOSIS — E11.21 TYPE 2 DIABETES WITH NEPHROPATHY (HCC): ICD-10-CM

## 2019-03-21 DIAGNOSIS — Z79.4 TYPE 2 DIABETES MELLITUS WITH DIABETIC POLYNEUROPATHY, WITH LONG-TERM CURRENT USE OF INSULIN (HCC): ICD-10-CM

## 2019-03-21 DIAGNOSIS — E11.42 TYPE 2 DIABETES MELLITUS WITH DIABETIC POLYNEUROPATHY, WITH LONG-TERM CURRENT USE OF INSULIN (HCC): ICD-10-CM

## 2019-03-21 DIAGNOSIS — C61 PROSTATE CANCER (HCC): ICD-10-CM

## 2019-03-21 RX ORDER — ATORVASTATIN CALCIUM 40 MG/1
40 TABLET, FILM COATED ORAL DAILY
Qty: 90 TAB | Refills: 4 | Status: SHIPPED | OUTPATIENT
Start: 2019-03-21 | End: 2019-03-29 | Stop reason: SDUPTHER

## 2019-03-21 RX ORDER — LOSARTAN POTASSIUM 100 MG/1
100 TABLET ORAL DAILY
Qty: 60 TAB | Refills: 0 | Status: SHIPPED | OUTPATIENT
Start: 2019-03-21 | End: 2019-03-29 | Stop reason: SDUPTHER

## 2019-03-21 RX ORDER — AMLODIPINE BESYLATE 2.5 MG/1
2.5 TABLET ORAL DAILY
Qty: 60 TAB | Refills: 0 | Status: SHIPPED | OUTPATIENT
Start: 2019-03-21 | End: 2019-03-29 | Stop reason: SDUPTHER

## 2019-03-21 RX ORDER — ERGOCALCIFEROL 1.25 MG/1
50000 CAPSULE ORAL 2 TIMES WEEKLY
Qty: 40 CAP | Refills: 1 | Status: CANCELLED | OUTPATIENT
Start: 2019-03-22

## 2019-03-21 NOTE — PATIENT INSTRUCTIONS
Call Dr. Meaghan Camacho office to find out when you are next expected to be seen either by him or by Dr. Zan Moise    Please send me a MyChart message with the name of your doctor at the [place]. Plan to see Dr. Doris Sánchez before the end of 2019 to revisit the question of long term anticoagulation with Plavix.

## 2019-03-21 NOTE — PROGRESS NOTES
Chief Complaint   Patient presents with    Diabetes     Pt in office for DM follow up and medication refills. 1. Have you been to the ER, urgent care clinic since your last visit? Hospitalized since your last visit? No    2. Have you seen or consulted any other health care providers outside of the [address] [place] [address] since your last visit? Include any pap smears or colon screening.  No

## 2019-03-21 NOTE — PROGRESS NOTES
Jai Bonner is a 59 y.o. male who was seen in clinic today (3/21/2019). Assessment & Plan:       ICD-10-CM ICD-9-CM    1. Type 2 diabetes mellitus with both eyes affected by proliferative retinopathy and macular edema, with long-term current use of insulin ([facility] E11.3513 250.50     Z79.4 362.02      V58.67      362.07    2. Diabetic nephropathy associated with type 2 diabetes mellitus (HCC) E11.21 250.40 losartan (COZAAR) 100 mg tablet     583.81    3. Diabetic macular edema (HCC) E11.311 250.50      362.07      362.01    4. Type 2 diabetes with nephropathy (HCC) E11.21 250.40      583.81    5. Type 2 diabetes mellitus with diabetic polyneuropathy, with long-term current use of insulin (HCC) E11.42 250.60 dulaglutide (TRULICITY) 1.5 OU/1.1 mL sub-q pen    Z79.4 357.2 atorvastatin (LIPITOR) 40 mg tablet     V58.67    6. Essential hypertension I10 401.9 amLODIPine (NORVASC) 2.5 mg tablet      losartan (COZAAR) 100 mg tablet   7. Vitamin D deficiency E55.9 268.9 VITAMIN D, 25 HYDROXY      VITAMIN D, 25 HYDROXY   8. Left hemiparesis ([facility]) G81.94 342.90    9. History of CVA with residual deficit I69.30 438.9    10. Prostate cancer ([facility] C61 185 REFERRAL TO UROLOGY   11. Obesity (BMI 30.0-34. 9) E66.9 278.00      DM2: labs 3 months old, significant improvement vs prior nonetheless and 10 lb weight loss since last visit  Don't slack off  Well controlled, continue present management    I am confident he was not released to annual follow up for his retinopathy. Doing well post CVA. Unclear to me how long he should remain anticoagulated. Patient Instructions   Call Dr. Shawna Stephenson office to find out when you are next expected to be seen either by him or by Dr. Kings Willis    Please send me a MyChart message with the name of your doctor at the [place]. Plan to see Dr. Demetrio Pineda before the end of 2019 to revisit the question of long term anticoagulation with Plavix.     Requested he bring documentation of his Pneumovax, hep c screen purportedly done through the [place]. Will plan to admin next visit if still lacking documentation. Hypertension: uncontrolled, workup in progress through Dr. Becerra Done office. Will leave meds unchanged pending that reassessment. Prostate cancer. Lost to follow up. Vit d: status? Follow-up and Dispositions    · Return in about 3 months (around 2019) for diabetes follow up, non fasting labs 1 week prior, (30). (A1c, hep c, cmp, lipids, b12, mag)        Subjective:   Monique Colunga. was seen today for Diabetes    DM2 with retinopathy, neuropathy and nephropathy    Key Antihyperglycemic Medications             dulaglutide (TRULICITY) 1.5 RI/4.6 mL sub-q pen (Taking) 0.5 mL by SubCUTAneous route every seven (7) days. insulin aspart U-100 (NOVOLOG FLEXPEN U-100 INSULIN) 100 unit/mL inpn (Taking) 10 Units by SubCUTAneous route. insulin glargine (LANTUS SOLOSTAR U-100 INSULIN) 100 unit/mL (3 mL) inpn (Taking) 35 Units by SubCUTAneous route daily. metFORMIN (GLUCOPHAGE) 1,000 mg tablet (Taking) TAKE ONE TABLET BY MOUTH TWICE DAILY WITH MEALS        Lab Results   Component Value Date/Time    Hemoglobin A1c 6.6 (H) 2018 09:00 AM    Hemoglobin A1c 8.2 (H) 10/02/2018 12:00 PM    Hemoglobin A1c 7.8 (H) 2018 02:59 PM    Glucose 238 (H) 2018 09:00 AM    Glucose (POC) 293 (H) 2010 08:32 AM    Glucose,  (H) 2010 10:23 AM    Microalb/Creat ratio (ug/mg creat.) 1,438.7 (H) 2018 08:31 AM    LDL, calculated 125 (H) 2018 02:59 PM    Creatinine 0.8 2018 09:00 AM         Home glucoses:  Fastin  PP lunch: NA  PP dinner: 175-180  Hypoglycemic episodes: rare, only with having missing a meal    No planned ophtho visits other than annual exam at [place]. Since last visit:  Aram Mari Dr. Burnie Eisenmenger (nephro) 2019 ? Hyperfilering. 24 hour urine and serological testing, no change in meds. FU in April.     Results for orders placed or performed in visit on 70/56/47   METABOLIC PANEL, BASIC   Result Value Ref Range    Glucose 238 (H) 70 - 99 mg/dL    BUN 12 6 - 22 mg/dL    Creatinine 0.8 0.8 - 1.6 mg/dL    Sodium 138 133 - 145 mmol/L    Potassium 4.5 3.5 - 5.5 mmol/L    Chloride 101 98 - 110 mmol/L    CO2 21 20 - 32 mmol/L    Calcium 9.2 8.4 - 10.4 mg/dL    Anion gap 16.0 mmol/L    GFRAA >60.0 >60.0    GFRNA >60.0 >60.0   HEMOGLOBIN A1C W/O EAG   Result Value Ref Range    Hemoglobin A1c 6.6 (H) 4.8 - 5.9 %    AVG  (H) 91 - 123 mg/dL          Key CAD CHF Meds             amLODIPine (NORVASC) 2.5 mg tablet (Taking) Take 1 Tab by mouth daily. losartan (COZAAR) 100 mg tablet (Taking) Take 1 Tab by mouth daily. clopidogrel (PLAVIX) 75 mg tab (Taking) 75 mg.    furosemide (LASIX) 20 mg tablet (Taking) Take 20 mg by mouth. atorvastatin (LIPITOR) 40 mg tablet (Taking) Take 40 mg by mouth daily. BP had been 165/84 at that visit with Dr. Lety Luo. On ARB and CCB then as well. Advised to document home pressures without change in meds. Hemiparesis plateau post CVA, mostly \"managing\", some unsteadiness some days, mid day when winding down to head home, LLE weakness. No discernable arm or face weakness    Most recent visit with Dr. Batsheva Carson in Dec:  Plan:   · Continue Plavix 75 mg daily  · Continue Lipitor 40 mg qhs  · Goal BP <130/85. If BP remains above this goal, please follow up closely with your PCP. · Call 911 if you develop recurrent stroke symptoms. · Reiterated importance of weight loss through diet and exercise.    · Follow up prn      Follow up vit d def: compliant with replacement since Oct    Lab Results   Component Value Date/Time    VITAMIN D, 25-HYDROXY 23.9 (L) 10/02/2018 12:00 PM    VITAMIN D, 25-HYDROXY 19.6 (L) 05/11/2018 02:59 PM    VITAMIN D, 25-HYDROXY 18.1 (L) 01/02/2014 09:02 AM           Outpatient Medications Marked as Taking for the 3/21/19 encounter (Office Visit) with Jairo Mcgill MD   Medication Sig Dispense Refill    dulaglutide (TRULICITY) 1.5 RE/5.6 mL sub-q pen 0.5 mL by SubCUTAneous route every seven (7) days. 4 Pen 0    amLODIPine (NORVASC) 2.5 mg tablet Take 1 Tab by mouth daily. 30 Tab 0    losartan (COZAAR) 100 mg tablet Take 1 Tab by mouth daily. 30 Tab 1    clopidogrel (PLAVIX) 75 mg tab 75 mg.  furosemide (LASIX) 20 mg tablet Take 20 mg by mouth.  insulin aspart U-100 (NOVOLOG FLEXPEN U-100 INSULIN) 100 unit/mL inpn 10 Units by SubCUTAneous route.  insulin glargine (LANTUS SOLOSTAR U-100 INSULIN) 100 unit/mL (3 mL) inpn 35 Units by SubCUTAneous route daily. 5 Pen 0    ergocalciferol (VITAMIN D2) 50,000 unit capsule Take 1 Cap by mouth two (2) times a week. 40 Cap 1    atorvastatin (LIPITOR) 40 mg tablet Take 40 mg by mouth daily.  sildenafil citrate (VIAGRA) 50 mg tablet 50 mg.      metFORMIN (GLUCOPHAGE) 1,000 mg tablet TAKE ONE TABLET BY MOUTH TWICE DAILY WITH MEALS 180 Tab 3       Patient Active Problem List    Diagnosis    Vitamin D deficiency    Diabetic nephropathy associated with type 2 diabetes mellitus ([address]    Vasculogenic erectile dysfunction    History of CVA with residual deficit     [facility] 10/25/2018: right MCA territory, right anterior choroidal artery, LLE>LUE weakness, no cranial nerve involvement. 11/5/2018: Ignacio Godoy MD: stop ASA, add plavix      Left hemiparesis ([address]    Diabetic macular edema ([address]     OD 4/19/2018 Ramy Huang MD      NAFLD (nonalcoholic fatty liver disease)      4/2018 [facility]. Mean liver stiffness value 1.31 (normal to mild stiffness)      History of pancreatitis     2012, 4/2018: no clear trigger. Lipase 682 on admission. biliary sludge without stones      Type 2 diabetes with nephropathy (HCC)    Obesity (BMI 30.0-34. 9)    CHELE on CPAP    Prostate cancer [facility])     Chart review: treated approx 2010 by Dr. Tyson Crowley, PSI in place. 8/15/2014 Freida Pedroza MD \"Remains JASON s/p PSI. Patient will return to the office in one year for KELSEA with PSA 1-2 weeks prior if PSA today WNL. \"      Type 2 diabetes mellitus with both eyes affected by proliferative retinopathy and macular edema, with long-term current use of insulin ([facility]     4/19/2018 Jasmyne Webb MD      Essential hypertension    Elevated cholesterol with high triglycerides         Allergies   Allergen Reactions    Byetta [Exenatide] Other (comments)     Pancreatits    Lisinopril Cough         Patient Care Team:  Cami Wilson MD as PCP - General (Family Practice)  Monserrat Barahona MD (Ophthalmology)  Ned Matthew MD (Neurology)  Barry Rios MD (Nephrology)    The following sections were reviewed & updated as appropriate: PMH, PSH, FH, and SH. Review of Systems   Constitutional: Negative for malaise/fatigue. Respiratory: Negative for shortness of breath. Cardiovascular: Negative for chest pain. Gastrointestinal: Negative for abdominal pain, diarrhea, nausea and vomiting. Neurological: Negative for sensory change, speech change, focal weakness and headaches. Objective:     Visit Vitals  /82   Pulse 87   Resp 14   Ht 6' 4\" (1.93 m)   Wt 285 lb (129.3 kg)   SpO2 98%   BMI 34.69 kg/m²      Physical Exam   Constitutional: He is oriented to person, place, and time. He appears well-developed. No distress. Pleasant obese black male   HENT:   Head: Normocephalic and atraumatic. Pulmonary/Chest: Effort normal.   Neurological: He is alert and oriented to person, place, and time. No gross deficits   Psychiatric: He has a normal mood and affect. His behavior is normal. Judgment and thought content normal.         Disclaimer: The patient understands our medical plan. Alternatives have been explained and offered. The risks, benefits and significant side effects of all medications have been reviewed. Anticipated time course and progression of condition reviewed.  All questions have been addressed. He is encouraged to employ the information provided in the after visit summary, which was reviewed. Where applicable, he is instructed to call the clinic if he has not been notified either by phone or through [address] with the results of his tests or with an appointment plan for any referrals within 1 week(s). No news is not good news; it's no news. The patient  is to call if his condition worsens or fails to improve or if significant side effects are experienced. Aspects of this note may have been generated using voice recognition software. Despite editing, there may be unrecognized errors.        Emily Peralta MD

## 2019-03-22 LAB — 25(OH)D3 SERPL-MCNC: 48.8 NG/ML (ref 32–100)

## 2019-03-27 DIAGNOSIS — E11.21 DIABETIC NEPHROPATHY ASSOCIATED WITH TYPE 2 DIABETES MELLITUS (HCC): ICD-10-CM

## 2019-03-27 DIAGNOSIS — Z79.4 TYPE 2 DIABETES MELLITUS WITH DIABETIC POLYNEUROPATHY, WITH LONG-TERM CURRENT USE OF INSULIN (HCC): ICD-10-CM

## 2019-03-27 DIAGNOSIS — E11.42 TYPE 2 DIABETES MELLITUS WITH DIABETIC POLYNEUROPATHY, WITH LONG-TERM CURRENT USE OF INSULIN (HCC): ICD-10-CM

## 2019-03-27 DIAGNOSIS — I10 ESSENTIAL HYPERTENSION: ICD-10-CM

## 2019-03-27 NOTE — TELEPHONE ENCOUNTER
Patient called at 4:24 requesting his prescription that was ordered by Dr. Krissy Brandt on 03/21/19 be sent to the [facility].

## 2019-03-27 NOTE — LETTER
4/23/2019 9:30 AM 
 
Mr. Bolaños McFarland [address] [address] Dear  Rachael Choi, We have been unable to reach you at the number on file. At your convenience, please give our office a call at [phone].  
 
 
 
 
Sincerely, 
 
 
Marcela Gruber MD

## 2019-03-29 RX ORDER — LOSARTAN POTASSIUM 100 MG/1
100 TABLET ORAL DAILY
Qty: 60 TAB | Refills: 0 | Status: SHIPPED | OUTPATIENT
Start: 2019-03-29 | End: 2019-07-11 | Stop reason: SDUPTHER

## 2019-03-29 RX ORDER — ATORVASTATIN CALCIUM 40 MG/1
40 TABLET, FILM COATED ORAL DAILY
Qty: 90 TAB | Refills: 4 | Status: SHIPPED | OUTPATIENT
Start: 2019-03-29

## 2019-03-29 RX ORDER — AMLODIPINE BESYLATE 2.5 MG/1
2.5 TABLET ORAL DAILY
Qty: 60 TAB | Refills: 0 | Status: SHIPPED | OUTPATIENT
Start: 2019-03-29 | End: 2019-07-11

## 2019-03-29 NOTE — TELEPHONE ENCOUNTER
Done. Please reinforce how important it is that follow up with the kidney specialist as his BP is not well controlled, which increases his risk for another stroke. He did not schedule his follow up with me in June for his DM (30). Labs prior.  ANA LILIA

## 2019-03-29 NOTE — TELEPHONE ENCOUNTER
Called to verify which prescription pt needed to be sent to the [facility] [address]. Mr. Ady Carmona said he needs all four medications that were filled on 3/21 to be sent:    Norvasc 2.5 mg  Lipitor 40 mg  Trulicity 1.5 UD/7.4 mL  Losartan 100 mg     I will call [facility] to cancel above scripts. Pt expressed clear understanding and had no further questions.

## 2019-05-01 ENCOUNTER — TELEPHONE (OUTPATIENT)
Dept: FAMILY MEDICINE CLINIC | Age: 64
End: 2019-05-01

## 2019-05-01 NOTE — TELEPHONE ENCOUNTER
Pt sees kidney specialist tomorrow 5/2/19. He has been scheduled for DM and labs. Pt expressed clear understanding and had no further questions. Margarita Jensen MD   Physician   Family Practice   Telephone Encounter   Signed   Encounter Date:  3/27/2019                       Done. Please reinforce how important it is that follow up with the kidney specialist as his BP is not well controlled, which increases his risk for another stroke. He did not schedule his follow up with me in June for his DM (30). Labs prior.  ANA LILIA

## 2019-05-01 NOTE — TELEPHONE ENCOUNTER
Kayla Chinyere Sanchez states he is returning a call to the nurse. Checked Cami, No future appointment currently scheduled. Call back number is [phone].

## 2019-05-28 ENCOUNTER — TELEPHONE (OUTPATIENT)
Dept: FAMILY MEDICINE CLINIC | Age: 64
End: 2019-05-28

## 2019-07-11 ENCOUNTER — OFFICE VISIT (OUTPATIENT)
Dept: FAMILY MEDICINE CLINIC | Age: 64
End: 2019-07-11

## 2019-07-11 VITALS
RESPIRATION RATE: 12 BRPM | WEIGHT: 288 LBS | DIASTOLIC BLOOD PRESSURE: 72 MMHG | OXYGEN SATURATION: 99 % | BODY MASS INDEX: 35.81 KG/M2 | HEART RATE: 89 BPM | TEMPERATURE: 98.3 F | SYSTOLIC BLOOD PRESSURE: 138 MMHG | HEIGHT: 75 IN

## 2019-07-11 DIAGNOSIS — Z23 ENCOUNTER FOR IMMUNIZATION: ICD-10-CM

## 2019-07-11 DIAGNOSIS — E11.3513 TYPE 2 DIABETES MELLITUS WITH BOTH EYES AFFECTED BY PROLIFERATIVE RETINOPATHY AND MACULAR EDEMA, WITH LONG-TERM CURRENT USE OF INSULIN (HCC): ICD-10-CM

## 2019-07-11 DIAGNOSIS — E11.21 DIABETIC NEPHROPATHY ASSOCIATED WITH TYPE 2 DIABETES MELLITUS (HCC): ICD-10-CM

## 2019-07-11 DIAGNOSIS — Z79.4 TYPE 2 DIABETES MELLITUS WITH BOTH EYES AFFECTED BY PROLIFERATIVE RETINOPATHY AND MACULAR EDEMA, WITH LONG-TERM CURRENT USE OF INSULIN (HCC): ICD-10-CM

## 2019-07-11 DIAGNOSIS — Z11.59 NEED FOR HEPATITIS C SCREENING TEST: ICD-10-CM

## 2019-07-11 DIAGNOSIS — I69.30 HISTORY OF CVA WITH RESIDUAL DEFICIT: ICD-10-CM

## 2019-07-11 DIAGNOSIS — I10 ESSENTIAL HYPERTENSION: Primary | ICD-10-CM

## 2019-07-11 RX ORDER — CLOPIDOGREL BISULFATE 75 MG/1
75 TABLET ORAL
Status: CANCELLED | OUTPATIENT
Start: 2019-07-11

## 2019-07-11 RX ORDER — AMLODIPINE BESYLATE 5 MG/1
5 TABLET ORAL DAILY
Qty: 30 TAB | Refills: 1 | Status: SHIPPED | OUTPATIENT
Start: 2019-07-11 | End: 2019-09-12

## 2019-07-11 RX ORDER — LOSARTAN POTASSIUM 100 MG/1
100 TABLET ORAL DAILY
Qty: 90 TAB | Refills: 0 | Status: SHIPPED | OUTPATIENT
Start: 2019-07-11 | End: 2019-09-12 | Stop reason: SDUPTHER

## 2019-07-11 RX ORDER — CLOPIDOGREL BISULFATE 75 MG/1
75 TABLET ORAL DAILY
Qty: 14 TAB | Refills: 0 | Status: SHIPPED | OUTPATIENT
Start: 2019-07-11 | End: 2019-07-25

## 2019-07-11 NOTE — PROGRESS NOTES
Ladi Purcell is a 59 y.o. male who was seen in clinic today (7/11/2019). Assessment & Plan:       ICD-10-CM ICD-9-CM    1. Essential hypertension Y42 239.4 METABOLIC PANEL, COMPREHENSIVE      amLODIPine (NORVASC) 5 mg tablet      losartan (COZAAR) 100 mg tablet      METABOLIC PANEL, COMPREHENSIVE   2. History of CVA with residual deficit I69.30 438.9 clopidogrel (PLAVIX) 75 mg tab   3. Diabetic nephropathy associated with type 2 diabetes mellitus (HCC) E11.21 250.40 losartan (COZAAR) 100 mg tablet     583.81    4. Type 2 diabetes mellitus with both eyes affected by proliferative retinopathy and macular edema, with long-term current use of insulin (HCC) E11.3513 250.50 HEMOGLOBIN A1C WITH EAG    Z79.4 362.02 MICROALBUMIN, UR, RAND W/ MICROALB/CREAT RATIO     V58.67 MAGNESIUM     362.07 LIPID PANEL W/ REFLX DIRECT LDL      VITAMIN B12 & FOLATE      MT IMMUNIZ ADMIN,1 SINGLE/COMB VAC/TOXOID      PNEUMOCOCCAL POLYSACCHARIDE VACCINE, 23-VALENT, ADULT OR IMMUNOSUPPRESSED PT DOSE,      VITAMIN B12 & FOLATE      LIPID PANEL W/ REFLX DIRECT LDL      MAGNESIUM      MICROALBUMIN, UR, RAND W/ MICROALB/CREAT RATIO      HEMOGLOBIN A1C WITH EAG   5. Encounter for immunization Z23 V03.89 MT IMMUNIZ ADMIN,1 SINGLE/COMB VAC/TOXOID      PNEUMOCOCCAL POLYSACCHARIDE VACCINE, 23-VALENT, ADULT OR IMMUNOSUPPRESSED PT DOSE,   6. Need for hepatitis C screening test Z11.59 V73.89 HEPATITIS C AB      HEPATITIS C AB       I'm concerned he is not likely to still require dual antiplatelet therapy. 14 day supply while he contacts Dr. Jordin Lin to confirm anticoag plan    Hypertension: uncontrolled. Continue ARB unchanged. Double CCB. Labs to prep for DM visit    Schedule retinopathy follow up    Follow-up and Dispositions    · Return in about 2 weeks (around 7/25/2019) for diabetes and blood pressure follow up, (30).                Subjective:   Ladi Purcell was seen today for Diabetes    Follow-up DM 2 with retinopathy, neuropathy and nephropathy    Key Antihyperglycemic Medications             dulaglutide (TRULICITY) 1.5 LA/5.7 mL sub-q pen (Taking) 0.5 mL by SubCUTAneous route every seven (7) days. insulin aspart U-100 (NOVOLOG FLEXPEN U-100 INSULIN) 100 unit/mL inpn (Taking) 10 Units by SubCUTAneous route. insulin glargine (LANTUS SOLOSTAR U-100 INSULIN) 100 unit/mL (3 mL) inpn (Taking) 35 Units by SubCUTAneous route daily. metFORMIN (GLUCOPHAGE) 1,000 mg tablet (Taking) TAKE ONE TABLET BY MOUTH TWICE DAILY WITH MEALS            Since last visit he reports: no significant changes. No labs. Hasn't scheduled retinopathy followup          Lab Results   Component Value Date/Time    Hemoglobin A1c 6.6 (H) 12/31/2018 09:00 AM    Hemoglobin A1c 8.2 (H) 10/02/2018 12:00 PM    Hemoglobin A1c 7.8 (H) 05/11/2018 02:59 PM    Glucose 238 (H) 12/31/2018 09:00 AM    Glucose (POC) 293 (H) 09/07/2010 08:32 AM    Glucose,  (H) 03/04/2010 10:23 AM    Microalb/Creat ratio (ug/mg creat.) 1,438.7 (H) 12/04/2018 08:31 AM    LDL, calculated 125 (H) 05/11/2018 02:59 PM    Creatinine 0.8 12/31/2018 09:00 AM       Magnesium   Date Value Ref Range Status   05/11/2018 1.3 (L) 1.6 - 2.5 mg/dL Final     Lab Results   Component Value Date/Time    Vitamin B12 502 05/11/2018 02:59 PM    Folate 7.45 05/11/2018 02:59 PM       Requesting refill of plavix, which he's not taken for a week. Hasn't contacted neurologist since last visit. follow up hypertension: Home pressures 160-170s  Key CAD CHF Meds             amLODIPine (NORVASC) 2.5 mg tablet (Taking) Take 1 Tab by mouth daily. atorvastatin (LIPITOR) 40 mg tablet (Taking) Take 1 Tab by mouth daily. losartan (COZAAR) 100 mg tablet (Taking) Take 1 Tab by mouth daily. clopidogrel (PLAVIX) 75 mg tab (Taking) 75 mg.    furosemide (LASIX) 20 mg tablet (Taking) Take 20 mg by mouth. Soc: [employer] school.  with plans to divorce wife of 14 years.            Outpatient Medications Marked as Taking for the 7/11/19 encounter (Office Visit) with Lou Cash MD   Medication Sig Dispense Refill    amLODIPine (NORVASC) 2.5 mg tablet Take 1 Tab by mouth daily. 60 Tab 0    atorvastatin (LIPITOR) 40 mg tablet Take 1 Tab by mouth daily. 90 Tab 4    dulaglutide (TRULICITY) 1.5 UMANZOR/2.7 mL sub-q pen 0.5 mL by SubCUTAneous route every seven (7) days. 12 Pen 2    losartan (COZAAR) 100 mg tablet Take 1 Tab by mouth daily. 60 Tab 0    clopidogrel (PLAVIX) 75 mg tab 75 mg.  furosemide (LASIX) 20 mg tablet Take 20 mg by mouth.  insulin aspart U-100 (NOVOLOG FLEXPEN U-100 INSULIN) 100 unit/mL inpn 10 Units by SubCUTAneous route.  insulin glargine (LANTUS SOLOSTAR U-100 INSULIN) 100 unit/mL (3 mL) inpn 35 Units by SubCUTAneous route daily. 5 Pen 0    ergocalciferol (VITAMIN D2) 50,000 unit capsule Take 1 Cap by mouth two (2) times a week. 40 Cap 1    sildenafil citrate (VIAGRA) 50 mg tablet 50 mg.      metFORMIN (GLUCOPHAGE) 1,000 mg tablet TAKE ONE TABLET BY MOUTH TWICE DAILY WITH MEALS 180 Tab 3       Patient Active Problem List    Diagnosis    Vitamin D deficiency    Diabetic nephropathy associated with type 2 diabetes mellitus ([address]    Vasculogenic erectile dysfunction    History of CVA with residual deficit     [facility] 10/25/2018: right MCA territory, right anterior choroidal artery, LLE>LUE weakness, no cranial nerve involvement. 11/5/2018: Yuki Valero MD: stop ASA, add plavix      Left hemiparesis ([address]    Diabetic macular edema ([address]     OD 4/19/2018 Jasbir June MD      NAFLD (nonalcoholic fatty liver disease)     [place] 4/2018 [facility]. Mean liver stiffness value 1.31 (normal to mild stiffness)      History of pancreatitis     2012, 4/2018: no clear trigger. Lipase 682 on admission. biliary sludge without stones      Type 2 diabetes with nephropathy (HCC)    Obesity (BMI 30.0-34. 9)    CHELE on CPAP    Prostate cancer [facility])     Chart review: treated approx 2010 by Dr. Gideon Lainez, PSI in place. 8/15/2014 Samanta Roberson MD \"Remains JASON s/p PSI. Patient will return to the office in one year for KELSEA with PSA 1-2 weeks prior if PSA today WNL. \"      Type 2 diabetes mellitus with both eyes affected by proliferative retinopathy and macular edema, with long-term current use of insulin ([address]     4/19/2018 Antoinette Banks MD      Essential hypertension    Elevated cholesterol with high triglycerides         Allergies   Allergen Reactions    Byetta [Exenatide] Other (comments)     Pancreatits    Lisinopril Cough         Patient Care Team:  Srinivasa Jon MD as PCP - General (Family Practice)  Gama Brown MD (Ophthalmology)  Sylwia Hollins MD (Neurology)  Skinny Caba MD (Nephrology)    The following sections were reviewed & updated as appropriate: PMH, PSH, FH, and SH. Review of Systems   Endo/Heme/Allergies: Does not bruise/bleed easily. Psychiatric/Behavioral: Positive for memory loss (mild, since CVA). Objective:     Visit Vitals  /72   Pulse 89   Temp 98.3 °F (36.8 °C)   Resp 12   Ht 6' 3\" (1.905 m)   Wt 288 lb (130.6 kg)   SpO2 99%   BMI 36.00 kg/m²      Physical Exam   Constitutional: He is oriented to person, place, and time. He appears well-developed. No distress. Pleasant obese black male   HENT:   Head: Normocephalic and atraumatic. Pulmonary/Chest: Effort normal.   Neurological: He is alert and oriented to person, place, and time. Psychiatric: He has a normal mood and affect. His behavior is normal. Judgment and thought content normal.         Disclaimer: The patient understands our medical plan. Alternatives have been explained and offered. The risks, benefits and significant side effects of all medications have been reviewed. Anticipated time course and progression of condition reviewed. All questions have been addressed.   He is encouraged to employ the information provided in the after visit summary, which was reviewed. Where applicable, he is instructed to call the clinic if he has not been notified either by phone or through [address] with the results of his tests or with an appointment plan for any referrals within 1 week(s). No news is not good news; it's no news. The patient  is to call if his condition worsens or fails to improve or if significant side effects are experienced. Aspects of this note may have been generated using voice recognition software. Despite editing, there may be unrecognized errors.        Lani Mcnair MD

## 2019-07-11 NOTE — PROGRESS NOTES
Chief Complaint   Patient presents with    Diabetes     Pt in office for dm follow up. He also says he needs refill on his plavix. 1. Have you been to the ER, urgent care clinic since your last visit? Hospitalized since your last visit? No    2. Have you seen or consulted any other health care providers outside of the [address] [place] [address] since your last visit? Include any pap smears or colon screening.  No

## 2019-07-11 NOTE — PROGRESS NOTES
Raphael Preston. 1955 received pneumovax today in office. VIS and consent given. Questions and concerns answered. Pt received vaccine in RD. No reaction noted. Pt tolerated well.

## 2019-07-11 NOTE — PATIENT INSTRUCTIONS
Call Tonny Wade to follow up your eye disease (retinopathy)  Call Dr. Marisol Ronquillo to follow up on your Plavix and memory issues. Home Blood Pressure Test: About This Test  What is it? A home blood pressure test allows you to keep track of your blood pressure at home. Blood pressure is a measure of the force of blood against the walls of your arteries. Blood pressure readings include two numbers, such as 130/80 (say \"130 over 80\"). The first number is the systolic pressure. The second number is the diastolic pressure. Why is this test done? You may do this test at home to:  · Find out if you have high blood pressure. · Track your blood pressure if you have high blood pressure. · Track how well medicine is working to reduce high blood pressure. · Check how lifestyle changes, such as weight loss and exercise, are affecting blood pressure. How can you prepare for the test?  · Do not use caffeine, tobacco, or medicines known to raise blood pressure (such as nasal decongestant sprays) for at least 30 minutes before taking your blood pressure. · Do not exercise for at least 30 minutes before taking your blood pressure. What happens before the test?  Take your blood pressure while you feel comfortable and relaxed. Sit quietly with both feet on the floor for at least 5 minutes before the test.  What happens during the test?  · Sit with your arm slightly bent and resting on a table so that your upper arm is at the same level as your heart. · Roll up your sleeve or take off your shirt to expose your upper arm. · Wrap the blood pressure cuff around your upper arm so that the lower edge of the cuff is about 1 inch above the bend of your elbow. Proceed with the following steps depending on if you are using an automatic or manual pressure monitor.   Automatic blood pressure monitors  · Press the on/off button on the automatic monitor and wait until the ready-to-measure \"heart\" symbol appears next to zero in the display window. · Press the start button. The cuff will inflate and deflate by itself. · Your blood pressure numbers will appear on the screen. · Write your numbers in your log book, along with the date and time. Manual blood pressure monitors  · Place the earpieces of a stethoscope in your ears, and place the bell of the stethoscope over the artery, just below the cuff. · Close the valve on the rubber inflating bulb. · Squeeze the bulb rapidly with your opposite hand to inflate the cuff until the dial or column of mercury reads about 30 mm Hg higher than your usual systolic pressure. If you do not know your usual pressure, inflate the cuff to 210 mm Hg or until the pulse at your wrist disappears. · Open the pressure valve just slightly by twisting or pressing the valve on the bulb. · As you watch the pressure slowly fall, note the level on the dial at which you first start to hear a pulsing or tapping sound through the stethoscope. This is your systolic blood pressure. · Continue letting the air out slowly. The sounds will become muffled and will finally disappear. Note the pressure when the sounds completely disappear. This is your diastolic blood pressure. Let out all the remaining air. · Write your numbers in your log book, along with the date and time. What else should you know about the test?  It is more accurate to take the average of several readings made throughout the day than to rely on a single reading. It's normal for blood pressure to go up and down throughout the day. Follow-up care is a key part of your treatment and safety. Be sure to make and go to all appointments, and call your doctor if you are having problems. It's also a good idea to keep a list of the medicines you take. Where can you learn more? Go to http://hunter-martita.info/.   Enter C427 in the search box to learn more about \"Home Blood Pressure Test: About This Test.\"  Current as of: July 22, 2018  Content Version: 11.9  © 8246-0055 [employer]. Care instructions adapted under license by [employer] (which disclaims liability or warranty for this information). If you have questions about a medical condition or this instruction, always ask your healthcare professional. [address] any warranty or liability for your use of this information.

## 2019-07-12 LAB
A-G RATIO,AGRAT: 1.3 RATIO (ref 1.1–2.6)
ALBUMIN SERPL-MCNC: 3.9 G/DL (ref 3.5–5)
ALP SERPL-CCNC: 52 U/L (ref 40–125)
ALT SERPL-CCNC: 25 U/L (ref 5–40)
ANION GAP SERPL CALC-SCNC: 12 MMOL/L
AST SERPL W P-5'-P-CCNC: 20 U/L (ref 10–37)
AVG GLU, 10930: 130 MG/DL (ref 91–123)
BILIRUB SERPL-MCNC: 0.3 MG/DL (ref 0.2–1.2)
BUN SERPL-MCNC: 16 MG/DL (ref 6–22)
CALCIUM SERPL-MCNC: 9.4 MG/DL (ref 8.4–10.4)
CHLORIDE SERPL-SCNC: 102 MMOL/L (ref 98–110)
CHOLEST SERPL-MCNC: 225 MG/DL (ref 110–200)
CO2 SERPL-SCNC: 27 MMOL/L (ref 20–32)
CREAT SERPL-MCNC: 0.9 MG/DL (ref 0.8–1.6)
FOLATE,FOL: 12.46 NG/ML
GFRAA, 66117: >60
GFRNA, 66118: >60
GLOBULIN,GLOB: 2.9 G/DL (ref 2–4)
GLUCOSE SERPL-MCNC: 153 MG/DL (ref 70–99)
HBA1C MFR BLD HPLC: 6.1 % (ref 4.8–5.9)
HCV AB SER IA-ACNC: NORMAL
HDLC SERPL-MCNC: 34 MG/DL (ref 40–59)
HDLC SERPL-MCNC: 6.6 MG/DL (ref 0–5)
LDLC SERPL CALC-MCNC: 163 MG/DL (ref 50–99)
MAGNESIUM SERPL-MCNC: 1.5 MG/DL (ref 1.6–2.5)
POTASSIUM SERPL-SCNC: 4.5 MMOL/L (ref 3.5–5.5)
PROT SERPL-MCNC: 6.8 G/DL (ref 6.2–8.1)
SODIUM SERPL-SCNC: 141 MMOL/L (ref 133–145)
TRIGL SERPL-MCNC: 142 MG/DL (ref 40–149)
VIT B12 SERPL-MCNC: 422 PG/ML (ref 211–911)
VLDLC SERPL CALC-MCNC: 28 MG/DL (ref 8–30)

## 2019-07-13 LAB
CREATININE, URINE: 193 MG/DL
MICROALB/CREAT RATIO, 140286: 1165.9 MCG/MG OF CREATININE (ref 0–30)
MICROALBUMIN,URINE RANDOM 140054: 2250.1 MCG/ML (ref 0.1–17)

## 2019-08-06 ENCOUNTER — TELEPHONE (OUTPATIENT)
Dept: FAMILY MEDICINE CLINIC | Age: 64
End: 2019-08-06

## 2019-08-06 NOTE — TELEPHONE ENCOUNTER
Called patient back phone went straight to voicemail left message asking patient to call the office back. Due for follow up on his HTN had an appt on 7/25/19 at 3:00 PM, patient no-showed, letter was sent to patient asking him to reschedule. Needs an appointment with Dr. Ritu Ulloa.

## 2019-08-12 NOTE — TELEPHONE ENCOUNTER
Called patient had him verify his  he was told he was supposed to come back in for follow with Dr. Ani Pacheco to go over his lab results he states he has rescheduled this appointment. He states he has another doctor that he sees who is with [facility] and was asking for the results for that doctor, he was told they may be able to access the results via care everywhere.

## 2019-09-12 ENCOUNTER — OFFICE VISIT (OUTPATIENT)
Dept: FAMILY MEDICINE CLINIC | Age: 64
End: 2019-09-12

## 2019-09-12 VITALS
HEART RATE: 81 BPM | OXYGEN SATURATION: 96 % | RESPIRATION RATE: 14 BRPM | SYSTOLIC BLOOD PRESSURE: 150 MMHG | HEIGHT: 75 IN | WEIGHT: 287.6 LBS | DIASTOLIC BLOOD PRESSURE: 86 MMHG | BODY MASS INDEX: 35.76 KG/M2 | TEMPERATURE: 98.7 F

## 2019-09-12 DIAGNOSIS — I10 ESSENTIAL HYPERTENSION: ICD-10-CM

## 2019-09-12 DIAGNOSIS — E11.21 DIABETIC NEPHROPATHY ASSOCIATED WITH TYPE 2 DIABETES MELLITUS (HCC): ICD-10-CM

## 2019-09-12 DIAGNOSIS — E66.01 SEVERE OBESITY (HCC): ICD-10-CM

## 2019-09-12 DIAGNOSIS — E11.311 DIABETIC MACULAR EDEMA (HCC): ICD-10-CM

## 2019-09-12 DIAGNOSIS — E11.21 TYPE 2 DIABETES WITH NEPHROPATHY (HCC): ICD-10-CM

## 2019-09-12 DIAGNOSIS — Z79.4 TYPE 2 DIABETES MELLITUS WITH BOTH EYES AFFECTED BY PROLIFERATIVE RETINOPATHY AND MACULAR EDEMA, WITH LONG-TERM CURRENT USE OF INSULIN (HCC): Primary | ICD-10-CM

## 2019-09-12 DIAGNOSIS — E11.3513 TYPE 2 DIABETES MELLITUS WITH BOTH EYES AFFECTED BY PROLIFERATIVE RETINOPATHY AND MACULAR EDEMA, WITH LONG-TERM CURRENT USE OF INSULIN (HCC): Primary | ICD-10-CM

## 2019-09-12 DIAGNOSIS — E83.42 HYPOMAGNESEMIA: ICD-10-CM

## 2019-09-12 DIAGNOSIS — Z23 ENCOUNTER FOR IMMUNIZATION: ICD-10-CM

## 2019-09-12 RX ORDER — ZINC GLUCONATE 10 MG
1 LOZENGE ORAL DAILY
COMMUNITY
Start: 2019-09-12 | End: 2022-10-18

## 2019-09-12 RX ORDER — AMLODIPINE BESYLATE 2.5 MG/1
2.5 TABLET ORAL DAILY
Qty: 90 TAB | Refills: 4 | Status: SHIPPED | OUTPATIENT
Start: 2019-09-12

## 2019-09-12 RX ORDER — FUROSEMIDE 40 MG/1
40 TABLET ORAL DAILY
Qty: 60 TAB | Refills: 1 | Status: SHIPPED | OUTPATIENT
Start: 2019-09-12

## 2019-09-12 RX ORDER — LOSARTAN POTASSIUM 100 MG/1
100 TABLET ORAL DAILY
Qty: 90 TAB | Refills: 4 | Status: SHIPPED | OUTPATIENT
Start: 2019-09-12

## 2019-09-12 NOTE — PROGRESS NOTES
Faye Blake. is a 59 y.o. male who was seen in clinic today (9/12/2019). Assessment & Plan:       ICD-10-CM ICD-9-CM    1. Type 2 diabetes mellitus with both eyes affected by proliferative retinopathy and macular edema, with long-term current use of insulin ([facility] E11.3513 250.50     Z79.4 362.02      V58.67      362.07    2. Severe obesity ([facility] E66.01 278.01    3. Diabetic macular edema (HCC) E11.311 250.50      362.01      362.07    4. Diabetic nephropathy associated with type 2 diabetes mellitus (HCC) E11.21 250.40 losartan (COZAAR) 100 mg tablet     583.81    5. Type 2 diabetes with nephropathy (HCC) E11.21 250.40      583.81    6. Hypomagnesemia E83.42 275.2 magnesium 250 mg tab      MAGNESIUM   7. Essential hypertension I10 401.9 amLODIPine (NORVASC) 2.5 mg tablet      furosemide (LASIX) 40 mg tablet      METABOLIC PANEL, BASIC      losartan (COZAAR) 100 mg tablet   8. Encounter for immunization Z23 V03.89 INFLUENZA VIRUS VAC QUAD,SPLIT,PRESV FREE SYRINGE IM       DM2: Well controlled, continue present management  Obesity: reminded important to follow diet, aim for normal weight    Retinopathy: strongly encouraged to schedule appointment with Dr. Leydi Edwards to ensure develops vision loss    Nephropathy: strongly encouraged to follow up with Dr. Carlos A Mccullough    Hypertension: uncontrolled. LE edema to higher CCB dose. Lower back to 2.5. Double furosemide. Continue ARB unchanged. Low Mag: new. replace           Follow-up and Dispositions    · Return in about 1 month (around 10/12/2019) for blood pressure follow up, non fasting labs 1 week prior. Subjective:   Faye Maddox was seen today for Hypertension and Diabetes      Follow up DM2 with retinopathy, neuropathy and nephropathy  Key Antihyperglycemic Medications             dulaglutide (TRULICITY) 1.5 YT/0.8 mL sub-q pen (Taking) 0.5 mL by SubCUTAneous route every seven (7) days.     insulin aspart U-100 (NOVOLOG FLEXPEN U-100 INSULIN) 100 unit/mL inpn (Taking) 10 Units by SubCUTAneous route. insulin glargine (LANTUS SOLOSTAR U-100 INSULIN) 100 unit/mL (3 mL) inpn (Taking) 35 Units by SubCUTAneous route daily. metFORMIN (GLUCOPHAGE) 1,000 mg tablet (Taking) TAKE ONE TABLET BY MOUTH TWICE DAILY WITH MEALS        Missed appointments with Dr. Nava Le, didn't schedule visit with Dr. Vero Ribera  Relocating to [facility] (divorce proceedings)    Since last visit he reports: no significant changes. He reports medication compliance: compliant all of the time. Medication side effects: none. Diabetic diet compliance: compliant most of the time   Activity level:moderately active    Home glucoses:  Fastin  PP lunch: NA  PP dinner: 160-170  Hypoglycemic episodes: none      Diabetic Foot and Eye Exam HM Status   Topic Date Due    Eye Exam  2019    Diabetic Foot Care  10/09/2019         Lab Results   Component Value Date/Time    Hemoglobin A1c 6.1 (H) 2019 02:52 PM    Hemoglobin A1c 6.6 (H) 2018 09:00 AM    Hemoglobin A1c 8.2 (H) 10/02/2018 12:00 PM    Glucose 153 (H) 2019 02:52 PM    Glucose (POC) 293 (H) 2010 08:32 AM    Glucose,  (H) 2010 10:23 AM    Microalb/Creat ratio (ug/mg creat.) 1,165.9 (H) 2019 02:52 PM    LDL, calculated 163 (H) 2019 02:52 PM    Creatinine 0.9 2019 02:52 PM       Magnesium   Date Value Ref Range Status   2019 1.5 (L) 1.6 - 2.5 mg/dL Final   2018 1.3 (L) 1.6 - 2.5 mg/dL Final     Lab Results   Component Value Date/Time    Vitamin B12 422 2019 02:52 PM    Folate 12.46 2019 02:52 PM         Follow-up hypertension: CCB doubled last visit  Key CAD CHF Meds             amLODIPine (NORVASC) 5 mg tablet (Taking) Take 1 Tab by mouth daily. losartan (COZAAR) 100 mg tablet (Taking) Take 1 Tab by mouth daily. atorvastatin (LIPITOR) 40 mg tablet (Taking) Take 1 Tab by mouth daily.     furosemide (LASIX) 20 mg tablet (Taking) Take 20 mg by mouth.        bilateral LE edema since change - none on lower dose        Results for orders placed or performed in visit on 07/11/19   HEPATITIS C AB   Result Value Ref Range    Hep C Virus Ab None Detected None Detec   VITAMIN B12 & FOLATE   Result Value Ref Range    Vitamin B12 422 211 - 911 pg/mL    Folate 12.46 >=1.87 ng/mL   METABOLIC PANEL, COMPREHENSIVE   Result Value Ref Range    Glucose 153 (H) 70 - 99 mg/dL    BUN 16 6 - 22 mg/dL    Creatinine 0.9 0.8 - 1.6 mg/dL    Sodium 141 133 - 145 mmol/L    Potassium 4.5 3.5 - 5.5 mmol/L    Chloride 102 98 - 110 mmol/L    CO2 27 20 - 32 mmol/L    AST (SGOT) 20 10 - 37 U/L    ALT (SGPT) 25 5 - 40 U/L    Alk. phosphatase 52 40 - 125 U/L    Bilirubin, total 0.3 0.2 - 1.2 mg/dL    Calcium 9.4 8.4 - 10.4 mg/dL    Protein, total 6.8 6.2 - 8.1 g/dL    Albumin 3.9 3.5 - 5.0 g/dL    A-G Ratio 1.3 1.1 - 2.6 ratio    Globulin 2.9 2.0 - 4.0 g/dL    Anion gap 12.0 mmol/L    GFRAA >60.0 >60.0    GFRNA >60.0 >60.0   MAGNESIUM   Result Value Ref Range    Magnesium 1.5 (L) 1.6 - 2.5 mg/dL   LIPID PANEL   Result Value Ref Range    Triglyceride 142 40 - 149 mg/dL    HDL Cholesterol 34 (L) 40 - 59 mg/dL    Cholesterol, total 225 (H) 110 - 200 mg/dL    CHOLESTEROL/HDL 6.6 0.0 - 5.0    LDL, calculated 163 (H) 50 - 99 mg/dL    VLDL, calculated 28 8 - 30 mg/dL   HEMOGLOBIN A1C W/O EAG   Result Value Ref Range    Hemoglobin A1c 6.1 (H) 4.8 - 5.9 %    AVG  (H) 91 - 123 mg/dL   MICROALBUMIN, UR, RAND   Result Value Ref Range    Creatinine, urine 193 mg/dL    Microalbumin, urine 2,250.1 (H) 0.1 - 17.0 mcg/mL    Microalb/Creat ratio (ug/mg creat.) 1,165.9 (H) 0.0 - 30.0 mcg/mg of Creatinine        Outpatient Medications Marked as Taking for the 9/12/19 encounter (Office Visit) with Jd Layton MD   Medication Sig Dispense Refill    amLODIPine (NORVASC) 5 mg tablet Take 1 Tab by mouth daily. 30 Tab 1    losartan (COZAAR) 100 mg tablet Take 1 Tab by mouth daily.  90 Tab 0    atorvastatin (LIPITOR) 40 mg tablet Take 1 Tab by mouth daily. 90 Tab 4    dulaglutide (TRULICITY) 1.5 OA/0.9 mL sub-q pen 0.5 mL by SubCUTAneous route every seven (7) days. 12 Pen 2    furosemide (LASIX) 20 mg tablet Take 20 mg by mouth.  insulin aspart U-100 (NOVOLOG FLEXPEN U-100 INSULIN) 100 unit/mL inpn 10 Units by SubCUTAneous route.  insulin glargine (LANTUS SOLOSTAR U-100 INSULIN) 100 unit/mL (3 mL) inpn 35 Units by SubCUTAneous route daily. 5 Pen 0    sildenafil citrate (VIAGRA) 50 mg tablet 50 mg.      metFORMIN (GLUCOPHAGE) 1,000 mg tablet TAKE ONE TABLET BY MOUTH TWICE DAILY WITH MEALS 180 Tab 3       Patient Active Problem List    Diagnosis    Severe obesity ([address]    Vitamin D deficiency    Diabetic nephropathy associated with type 2 diabetes mellitus ([address]    Vasculogenic erectile dysfunction    History of CVA with residual deficit     [facility] 10/25/2018: right MCA territory, right anterior choroidal artery, LLE>LUE weakness, no cranial nerve involvement. 11/5/2018: Mirta Stevens MD: stop ASA, add plavix      Left hemiparesis ([address]    Diabetic macular edema ([address]     OD 4/19/2018 Mary Díaz MD      NAFLD (nonalcoholic fatty liver disease)      4/2018 [facility]. Mean liver stiffness value 1.31 (normal to mild stiffness)      History of pancreatitis     2012, 4/2018: no clear trigger. Lipase 682 on admission. biliary sludge without stones      Type 2 diabetes with nephropathy (HCC)    Obesity (BMI 30.0-34. 9)    CHELE on CPAP    Prostate cancer [facility])     Chart review: treated approx 2010 by Dr. Sudheer Grimm, PSI in place. 8/15/2014 Yong Weinstein MD \"Remains JASON s/p PSI. Patient will return to the office in one year for KELSEA with PSA 1-2 weeks prior if PSA today WNL. \"      Type 2 diabetes mellitus with both eyes affected by proliferative retinopathy and macular edema, with long-term current use of insulin ([address]     4/19/2018 Dotty Salas MD      Essential hypertension    Elevated cholesterol with high triglycerides         Allergies   Allergen Reactions    Byetta [Exenatide] Other (comments)     Pancreatits    Lisinopril Cough         Patient Care Team:  Anjali Rosas MD as PCP - General (Family Practice)  Butch Avelar MD (Ophthalmology)  Chato Aldrich MD (Neurology)  Madelaine Abdul MD (Nephrology)    The following sections were reviewed & updated as appropriate: PMH, PSH, FH, and SH. Review of Systems   Constitutional: Negative for malaise/fatigue. Respiratory: Negative for shortness of breath. Cardiovascular: Negative for chest pain. Neurological: Negative for sensory change, speech change, focal weakness and headaches. Objective:     Visit Vitals  /86   Pulse 81   Temp 98.7 °F (37.1 °C) (Oral)   Resp 14   Ht 6' 3\" (1.905 m)   Wt 287 lb 9.6 oz (130.5 kg)   SpO2 96%   BMI 35.95 kg/m²      Physical Exam   Constitutional: He is oriented to person, place, and time. He appears well-developed. No distress. Pleasant obese black male   HENT:   Head: Normocephalic and atraumatic. Pulmonary/Chest: Effort normal.   Neurological: He is alert and oriented to person, place, and time. Psychiatric: He has a normal mood and affect. His behavior is normal. Judgment and thought content normal.         Disclaimer: The patient understands our medical plan. Alternatives have been explained and offered. The risks, benefits and significant side effects of all medications have been reviewed. Anticipated time course and progression of condition reviewed. All questions have been addressed. He is encouraged to employ the information provided in the after visit summary, which was reviewed.       Where applicable, he is instructed to call the clinic if he has not been notified either by phone or through [address] with the results of his tests or with an appointment plan for any referrals within 1 week(s). No news is not good news; it's no news. The patient  is to call if his condition worsens or fails to improve or if significant side effects are experienced. Aspects of this note may have been generated using voice recognition software. Despite editing, there may be unrecognized errors.        Zoya Wlaton MD

## 2019-09-12 NOTE — PROGRESS NOTES
Patient here for f/u on his HTN and DM. 1. Have you been to the ER, urgent care clinic since your last visit? Hospitalized since your last visit? Yes When: 8/17/19 Where: [facility] ER Reason for visit: SOB  2. Have you seen or consulted any other health care providers outside of the [address] [place] [address] since your last visit? Include any pap smears or colon screening. No    Medication reconciliation has been completed with patient. Care team discussed/updated as well as pharmacy. Health Maintenance Due   Topic Date Due    DTaP/Tdap/Td series (1 - Tdap) 11/25/1977    Shingrix Vaccine Age 50> (1 of 2) 11/25/2006    COLONOSCOPY  05/03/2016    EYE EXAM RETINAL OR DILATED  04/19/2019    Influenza Age 9 to Adult  08/01/2019    FOOT EXAM Q1  10/09/2019       Health Maintenance reviewed - Declined flu vavcine.

## 2019-09-24 PROBLEM — N18.2 CKD (CHRONIC KIDNEY DISEASE) STAGE 2, GFR 60-89 ML/MIN: Status: ACTIVE | Noted: 2019-09-24

## 2019-10-11 ENCOUNTER — TELEPHONE (OUTPATIENT)
Dept: FAMILY MEDICINE CLINIC | Age: 64
End: 2019-10-11

## 2019-10-11 NOTE — TELEPHONE ENCOUNTER
Patient called the office spoke with front office staff cancelled his appointment due to insurance issues.

## 2019-10-12 DIAGNOSIS — I10 ESSENTIAL HYPERTENSION: ICD-10-CM

## 2019-10-12 DIAGNOSIS — E83.42 HYPOMAGNESEMIA: ICD-10-CM

## 2019-11-08 ENCOUNTER — HOSPITAL ENCOUNTER (EMERGENCY)
Age: 64
Discharge: HOME | End: 2019-11-08
Attending: EMERGENCY MEDICINE | Admitting: EMERGENCY MEDICINE
Payer: SELF-PAY

## 2019-11-08 ENCOUNTER — APPOINTMENT (OUTPATIENT)
Dept: GENERAL RADIOLOGY | Age: 64
End: 2019-11-08
Attending: PHYSICIAN ASSISTANT
Payer: SELF-PAY

## 2019-11-08 VITALS
DIASTOLIC BLOOD PRESSURE: 85 MMHG | HEART RATE: 92 BPM | TEMPERATURE: 97.9 F | WEIGHT: 286 LBS | RESPIRATION RATE: 16 BRPM | SYSTOLIC BLOOD PRESSURE: 175 MMHG | OXYGEN SATURATION: 98 % | BODY MASS INDEX: 35.75 KG/M2
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
## 2019-11-08 DIAGNOSIS — M54.2 CERVICALGIA: Primary | ICD-10-CM

## 2019-11-08 PROCEDURE — 99283 EMERGENCY DEPT VISIT LOW MDM: CPT

## 2019-11-08 RX ORDER — METHOCARBAMOL 750 MG/1
750 TABLET, FILM COATED ORAL
Qty: 21 TAB | Refills: 0 | Status: SHIPPED | OUTPATIENT
Start: 2019-11-08 | End: 2022-10-18
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
## 2020-12-15 ENCOUNTER — HOSPITAL ENCOUNTER (OUTPATIENT)
Dept: PHYSICAL THERAPY | Age: 65
Discharge: HOME | End: 2020-12-15

## 2020-12-15 NOTE — PROGRESS NOTES
OT DAILY TREATMENT NOTE  Patient Name: Inessa Colunga Date:12/15/2020 : 1955 [x]  Patient  Verified Payor: [address] / Plan: [address] / Product Type: Federal Funded Programs / In time:***  Out time:*** Total Treatment Time (min): *** Visit #: *** of *** Medicare/BCBS Only Total Timed Codes (min):  *** 1:1 Treatment Time:  *** Treatment Area: Joint pain [M25.50] SUBJECTIVE Pain Level (0-10 scale): *** Any medication changes, allergies to medications, adverse drug reactions, diagnosis change, or new procedure performed?: [x] No    [] Yes (see summary sheet for update) Subjective functional status/changes:   [] No changes reported *** OBJECTIVE Modality rationale: {BSI INMOTION MODALITIES:79881} to improve the patients ability to *** Min Type Additional Details  
 [] Estim:  []Unatt       []IFC  []Premod []Other:  []w/ice   []w/heat Position: Location:  
 [] Estim: []Att    []TENS instruct  []NMES []Other:  []w/US   []w/ice   []w/heat Position: Location:  
 []  Traction: [] Cervical       []Lumbar 
                     [] Prone          []Supine []Intermittent   []Continuous Lbs: 
[] before manual 
[] after manual  
 []  Ultrasound: []Continuous   [] Pulsed []1MHz   []3MHz W/cm2: 
Location:  
 []  Iontophoresis with dexamethasone Location: [] Take home patch  
[] In clinic  
 []  Ice     []  heat 
[]  Ice massage 
[]  Laser  
[]  Paraffin Position: Location:  
 []  Laser with stim 
[]  Other:  Position: Location:  
 []  Vasopneumatic Device Pressure:       [] lo [] med [] hi  
Temperature: [] lo [] med [] hi  
 
 
[] Skin assessment post-treatment:  []intact []redness- no adverse reaction 
  []redness  adverse reaction:  
 
*** min []Eval                  []Re-Eval  
 
 
 *** min Therapeutic Exercise:  [] See flow sheet :  
Rationale: {BSHSI IMMOTION THER EX:89393} to improve the patients ability to *** 
 
*** min Therapeutic Activity:  []  See flow sheet :  
Rationale: {BSHSI IMMOTION THER EX:47646}  to improve the patients ability to *** 
  
*** min Neuromuscular Re-education:  []  See flow sheet :  
Rationale: {BSHSI IMMOTION THER EX:14664}  to improve the patients ability to *** 
 
*** min Manual Therapy:  *** The manual therapy interventions were performed at a separate and distinct time from the therapeutic activities interventions. Rationale: {BSHSI IMMOTION MANUAL THERAPY:01918} to *** 
 
 min Orthotic/Splinting: *** Rationale: {BSHSI IMMOTION THER EX:28175}  to improve the patients ability to *** 
 
*** min Self Care/Home Management: *** Rationale: {BSHSI IMMOTION THER EX:98808}  to improve the patients ability to *** With 
 [] TE 
 [] TA 
 [] neuro 
 [] other: Patient Education: [x] Review HEP [] Progressed/Changed HEP based on:  
[] positioning   [] body mechanics   [] transfers   [] heat/ice application  
[] Splint wear/care   [] Sensory re-education   [] scar management     
[] other:   
 
     
Other Objective/Functional Measures: *** Pain Level (0-10 scale) post treatment: *** ASSESSMENT/Changes in Function:   
[]  See Plan of Care 
[]  See progress note/recertification 
[]  See Discharge Summary PLAN 
[]  Upgrade activities as tolerated     []  Continue plan of care 
[]  Update interventions per flow sheet      
[]  Discharge due to:_ 
[]  Other:_ ESEQUIEL Denney/L 12/15/2020  11:17 AM 
 
No future appointments.

## 2022-03-18 PROBLEM — N52.9 VASCULOGENIC ERECTILE DYSFUNCTION: Status: ACTIVE | Noted: 2018-11-16

## 2022-03-18 PROBLEM — K76.0 NAFLD (NONALCOHOLIC FATTY LIVER DISEASE): Status: ACTIVE | Noted: 2018-05-24

## 2022-03-18 PROBLEM — E66.811 OBESITY (BMI 30.0-34.9): Status: ACTIVE | Noted: 2018-05-24

## 2022-03-18 PROBLEM — E55.9 VITAMIN D DEFICIENCY: Status: ACTIVE | Noted: 2018-11-16

## 2022-03-18 PROBLEM — E66.9 OBESITY (BMI 30.0-34.9): Status: ACTIVE | Noted: 2018-05-24

## 2022-03-19 PROBLEM — E11.311 DIABETIC MACULAR EDEMA (HCC): Status: ACTIVE | Noted: 2018-05-25

## 2022-03-19 PROBLEM — G47.33 OSA ON CPAP: Status: ACTIVE | Noted: 2018-05-11

## 2022-03-19 PROBLEM — Z99.89 OSA ON CPAP: Status: ACTIVE | Noted: 2018-05-11

## 2022-03-19 PROBLEM — I69.30 HISTORY OF CVA WITH RESIDUAL DEFICIT: Status: ACTIVE | Noted: 2018-10-29

## 2022-03-19 PROBLEM — G81.94 LEFT HEMIPARESIS (HCC): Status: ACTIVE | Noted: 2018-10-29

## 2022-03-19 PROBLEM — E66.01 SEVERE OBESITY (HCC): Status: ACTIVE | Noted: 2019-09-12

## 2022-03-19 PROBLEM — E11.21 DIABETIC NEPHROPATHY ASSOCIATED WITH TYPE 2 DIABETES MELLITUS (HCC): Status: ACTIVE | Noted: 2018-11-16

## 2022-03-19 PROBLEM — E83.42 HYPOMAGNESEMIA: Status: ACTIVE | Noted: 2019-09-12

## 2022-03-19 PROBLEM — N18.2 CKD (CHRONIC KIDNEY DISEASE) STAGE 2, GFR 60-89 ML/MIN: Status: ACTIVE | Noted: 2019-09-24

## 2022-03-19 PROBLEM — Z87.19 HISTORY OF PANCREATITIS: Status: ACTIVE | Noted: 2018-05-24

## 2022-03-19 PROBLEM — E11.21 TYPE 2 DIABETES WITH NEPHROPATHY (HCC): Status: ACTIVE | Noted: 2018-05-24

## 2022-08-23 ENCOUNTER — OFFICE VISIT (OUTPATIENT)
Dept: FAMILY MEDICINE CLINIC | Age: 67
End: 2022-08-23
Payer: OTHER GOVERNMENT

## 2022-08-23 VITALS
HEART RATE: 89 BPM | WEIGHT: 279 LBS | SYSTOLIC BLOOD PRESSURE: 134 MMHG | TEMPERATURE: 98.5 F | DIASTOLIC BLOOD PRESSURE: 60 MMHG | HEIGHT: 75 IN | BODY MASS INDEX: 34.69 KG/M2 | OXYGEN SATURATION: 98 % | RESPIRATION RATE: 14 BRPM

## 2022-08-23 DIAGNOSIS — N18.2 CKD (CHRONIC KIDNEY DISEASE) STAGE 2, GFR 60-89 ML/MIN: ICD-10-CM

## 2022-08-23 DIAGNOSIS — E83.42 HYPOMAGNESEMIA: ICD-10-CM

## 2022-08-23 DIAGNOSIS — N18.9 CHRONIC KIDNEY DISEASE, UNSPECIFIED CKD STAGE: ICD-10-CM

## 2022-08-23 DIAGNOSIS — E11.3513 TYPE 2 DIABETES MELLITUS WITH BOTH EYES AFFECTED BY PROLIFERATIVE RETINOPATHY AND MACULAR EDEMA, WITH LONG-TERM CURRENT USE OF INSULIN (HCC): ICD-10-CM

## 2022-08-23 DIAGNOSIS — R09.89 BRUIT OF LEFT CAROTID ARTERY: Primary | ICD-10-CM

## 2022-08-23 DIAGNOSIS — I69.30 HISTORY OF CVA WITH RESIDUAL DEFICIT: ICD-10-CM

## 2022-08-23 DIAGNOSIS — I10 ESSENTIAL HYPERTENSION: ICD-10-CM

## 2022-08-23 DIAGNOSIS — E55.9 VITAMIN D DEFICIENCY: ICD-10-CM

## 2022-08-23 DIAGNOSIS — E11.21 TYPE 2 DIABETES WITH NEPHROPATHY (HCC): ICD-10-CM

## 2022-08-23 DIAGNOSIS — E11.21 DIABETIC NEPHROPATHY ASSOCIATED WITH TYPE 2 DIABETES MELLITUS (HCC): ICD-10-CM

## 2022-08-23 DIAGNOSIS — Z79.4 TYPE 2 DIABETES MELLITUS WITH BOTH EYES AFFECTED BY PROLIFERATIVE RETINOPATHY AND MACULAR EDEMA, WITH LONG-TERM CURRENT USE OF INSULIN (HCC): ICD-10-CM

## 2022-08-23 PROCEDURE — 1123F ACP DISCUSS/DSCN MKR DOCD: CPT | Performed by: FAMILY MEDICINE

## 2022-08-23 PROCEDURE — 99214 OFFICE O/P EST MOD 30 MIN: CPT | Performed by: FAMILY MEDICINE

## 2022-08-23 RX ORDER — TADALAFIL 20 MG/1
20 TABLET ORAL
COMMUNITY

## 2022-08-23 NOTE — PROGRESS NOTES
Olya Nogueira (: 1955) is a 73 y.o. male, established patient, here for:    ASSESSMENT/PLAN:  1. Bruit of left carotid artery  Assessment & Plan:  Currently asymptomatic but history of CVA. Dopplers for better stratification to surg vs med management. Continue asa   Orders:  -     DUPLEX CAROTID BILATERAL; Future  2. History of CVA with residual deficit  3. Type 2 diabetes with nephropathy [facility])  Assessment & Plan:  Status? Continue present management pending review of labs    4. Type 2 diabetes mellitus with both eyes affected by proliferative retinopathy and macular edema, with long-term current use of insulin ([facility])  5. Diabetic nephropathy associated with type 2 diabetes mellitus ([facility] [address]  -     HEMOGLOBIN A1C WITH EAG; Future  -     LIPID PANEL; Future  6. CKD (chronic kidney disease) stage 2, GFR 60-89 ml/min  Assessment & Plan: With excessive proteinuria. Lost to follow up with Dr. Leanora Cockayne whose notes had indicated need for further workup if proteinuria progressed. I believe he has left the area. Referring. Orders:  -     PTH INTACT; Future  -     PHOSPHORUS; Future  -     METABOLIC PANEL, COMPREHENSIVE; Future  -     VITAMIN D, 25 HYDROXY; Future  -     CBC WITH AUTOMATED DIFF; Future  -     FERRITIN; Future  -     IRON PROFILE; Future  -     MICROALBUMIN, UR, RAND W/ MICROALB/CREAT RATIO; Future  -     REFERRAL TO NEPHROLOGY  7. Hypomagnesemia  Assessment & Plan:  Status? Orders:  -     MAGNESIUM; Future  8. Essential hypertension  Assessment & Plan:  Systolic in 042M not at target given comorbidities. Reassess next visit with lab data to inform decision making. 9. Chronic kidney disease, unspecified CKD stage  -     VITAMIN D, 25 HYDROXY; Future  -     FERRITIN; Future  -     IRON PROFILE; Future  10.  Vitamin D deficiency  -     VITAMIN D, 25 HYDROXY; Future      Return in about 3 weeks (around 2022) for lab and Doppler review, (30), separate visit for Medicare [employer] (30).      SUBJECTIVE/OBJECTIVE:  HPI    First visit since Sept 2019  DM2/obesity with nephropathy, retinopathy and macular edema  CKD with excessive proteinuria  hypertension     Interim moved to [facility] and back  Had been seen through the [place] there  Has seen eye doctor - most recently February 2022. He was talking about surgery sounds like for cataracts   Recalls A1c 5.7 - most recent labs Feb 2022  Missed about a month of aspirin due to logistics  Physician friend recently heard carotid bruits - prompted by hx CVA, not symptoms    Did not see nephrologist in [facility] [address]    Had colo in Feb    Eating less meat    Review of Systems   Constitutional:  Negative for malaise/fatigue. Respiratory:  Negative for shortness of breath. Cardiovascular:  Negative for chest pain. Neurological:  Positive for headaches (occassional). Negative for sensory change, speech change and focal weakness. Physical Exam  Constitutional:       General: He is not in acute distress. Appearance: He is well-developed. He is obese. HENT:      Head: Normocephalic and atraumatic. Neck:      Vascular: Carotid bruit (left) present. Cardiovascular:      Rate and Rhythm: Normal rate and regular rhythm. Heart sounds: Normal heart sounds. No murmur heard. No friction rub. No gallop. Pulmonary:      Effort: Pulmonary effort is normal. No respiratory distress. Breath sounds: Normal breath sounds. No wheezing, rhonchi or rales. Musculoskeletal:      Cervical back: Neck supple. Skin:     General: Skin is warm and dry. Neurological:      Mental Status: He is alert and oriented to person, place, and time. Psychiatric:         Behavior: Behavior normal.         Thought Content:  Thought content normal.         Judgment: Judgment normal.             -- Avinash Rivera MD

## 2022-08-23 NOTE — ASSESSMENT & PLAN NOTE
With excessive proteinuria. Lost to follow up with Dr. David Donovan whose notes had indicated need for further workup if proteinuria progressed. I believe he has left the area. Referring.

## 2022-08-23 NOTE — PROGRESS NOTES
Patient here today for follow up he says he just wants a check up. He says he has been having pressure behind his ears and he feels this is being caused by his arteries. 1. \"Have you been to the ER, urgent care clinic since your last visit? Hospitalized since your last visit? \" No    2. \"Have you seen or consulted any other health care providers outside of the [address] [place] [address] since your last visit? \"  [facility]      3. For patients aged 39-70: Has the patient had a colonoscopy / FIT/ Cologuard? Yes - Care Gap present. Rooming MA/LPN to request most recent results      If the patient is female:    4. For patients aged 41-77: Has the patient had a mammogram within the past 2 years? NA - based on age or sex      11. For patients aged 21-65: Has the patient had a pap smear?  NA - based on age or sex

## 2022-08-23 NOTE — ASSESSMENT & PLAN NOTE
Systolic in 293N not at target given comorbidities. Reassess next visit with lab data to inform decision making.

## 2022-08-23 NOTE — ASSESSMENT & PLAN NOTE
Currently asymptomatic but history of CVA. Dopplers for better stratification to surg vs med management.  Continue asa

## 2022-09-12 ENCOUNTER — TELEPHONE (OUTPATIENT)
Dept: FAMILY MEDICINE CLINIC | Age: 67
End: 2022-09-12

## 2022-09-12 NOTE — TELEPHONE ENCOUNTER
Called patient to let remind him of his appt with Dr. Gary Jauregui on 9/16 and need to have labs drawn. Patient asked to reschedule his appt due his work schedule. He has been rescheduled for 9/27 and will have his labs done at [facility].

## 2022-09-23 ENCOUNTER — TELEPHONE (OUTPATIENT)
Dept: FAMILY MEDICINE CLINIC | Age: 67
End: 2022-09-23

## 2022-09-23 NOTE — TELEPHONE ENCOUNTER
Called patient  verified he was made aware he has an appt with Dr. Zena Stanford on  for lab review and f/u on testing which I did not see any results on file for. Patient says he has not had his labs/testing done as of yet and has opted to reschedule his appt. He says he recently started working and did not have a lot of hours where he can take off. He has been rescheduled on . Told patient I will fax order for his doppler study to central scheduling so they can reach back out to him. Patient has expressed understanding and agrees with this plan.

## 2022-09-27 ENCOUNTER — TRANSCRIBE ORDER (OUTPATIENT)
Dept: SCHEDULING | Age: 67
End: 2022-09-27

## 2022-09-27 NOTE — TELEPHONE ENCOUNTER
Order for carotid study has been faxed to [facility] central scheduling, fax confirmation has been received.

## 2022-09-30 ENCOUNTER — TRANSCRIBE ORDER (OUTPATIENT)
Dept: SCHEDULING | Age: 67
End: 2022-09-30

## 2022-09-30 DIAGNOSIS — R09.89 ABNORMAL CHEST SOUNDS: Primary | ICD-10-CM

## 2022-10-18 ENCOUNTER — OFFICE VISIT (OUTPATIENT)
Dept: FAMILY MEDICINE CLINIC | Age: 67
End: 2022-10-18
Payer: MEDICARE

## 2022-10-18 ENCOUNTER — HOSPITAL ENCOUNTER (OUTPATIENT)
Dept: LAB | Age: 67
Discharge: HOME | End: 2022-10-18
Payer: MEDICARE

## 2022-10-18 VITALS
SYSTOLIC BLOOD PRESSURE: 150 MMHG | BODY MASS INDEX: 34.44 KG/M2 | TEMPERATURE: 98.9 F | RESPIRATION RATE: 14 BRPM | HEIGHT: 75 IN | DIASTOLIC BLOOD PRESSURE: 72 MMHG | HEART RATE: 81 BPM | OXYGEN SATURATION: 97 % | WEIGHT: 277 LBS

## 2022-10-18 DIAGNOSIS — I69.30 HISTORY OF CVA WITH RESIDUAL DEFICIT: ICD-10-CM

## 2022-10-18 DIAGNOSIS — G81.94 LEFT HEMIPARESIS (HCC): ICD-10-CM

## 2022-10-18 DIAGNOSIS — E11.21 DIABETIC NEPHROPATHY ASSOCIATED WITH TYPE 2 DIABETES MELLITUS (HCC): ICD-10-CM

## 2022-10-18 DIAGNOSIS — Z85.46 HISTORY OF PROSTATE CANCER: ICD-10-CM

## 2022-10-18 DIAGNOSIS — E11.3513 TYPE 2 DIABETES MELLITUS WITH BOTH EYES AFFECTED BY PROLIFERATIVE RETINOPATHY AND MACULAR EDEMA, WITH LONG-TERM CURRENT USE OF INSULIN (HCC): ICD-10-CM

## 2022-10-18 DIAGNOSIS — Z79.4 TYPE 2 DIABETES MELLITUS WITH BOTH EYES AFFECTED BY PROLIFERATIVE RETINOPATHY AND MACULAR EDEMA, WITH LONG-TERM CURRENT USE OF INSULIN (HCC): ICD-10-CM

## 2022-10-18 DIAGNOSIS — E11.311 DIABETIC MACULAR EDEMA (HCC): ICD-10-CM

## 2022-10-18 DIAGNOSIS — E55.9 VITAMIN D DEFICIENCY: ICD-10-CM

## 2022-10-18 DIAGNOSIS — N18.2 CKD (CHRONIC KIDNEY DISEASE) STAGE 2, GFR 60-89 ML/MIN: ICD-10-CM

## 2022-10-18 DIAGNOSIS — Z00.00 WELCOME TO MEDICARE PREVENTIVE VISIT: Primary | ICD-10-CM

## 2022-10-18 DIAGNOSIS — E83.42 HYPOMAGNESEMIA: ICD-10-CM

## 2022-10-18 DIAGNOSIS — N18.9 CHRONIC KIDNEY DISEASE, UNSPECIFIED CKD STAGE: ICD-10-CM

## 2022-10-18 LAB
25(OH)D3 SERPL-MCNC: 42.7 NG/ML (ref 30–100)
ALBUMIN SERPL-MCNC: 3.6 G/DL (ref 3.4–5)
ALBUMIN/GLOB SERPL: 1 {RATIO} (ref 0.8–1.7)
ALP SERPL-CCNC: 55 U/L (ref 45–117)
ALT SERPL-CCNC: 30 U/L (ref 16–61)
ANION GAP SERPL CALC-SCNC: 5 MMOL/L (ref 3–18)
AST SERPL-CCNC: 17 U/L (ref 10–38)
BASOPHILS # BLD: 0.1 K/UL (ref 0–0.1)
BASOPHILS NFR BLD: 1 % (ref 0–2)
BILIRUB SERPL-MCNC: 0.3 MG/DL (ref 0.2–1)
BUN SERPL-MCNC: 15 MG/DL (ref 7–18)
BUN/CREAT SERPL: 15 (ref 12–20)
CALCIUM SERPL-MCNC: 9.6 MG/DL (ref 8.5–10.1)
CALCIUM SERPL-MCNC: 9.6 MG/DL (ref 8.5–10.1)
CHLORIDE SERPL-SCNC: 104 MMOL/L (ref 100–111)
CHOLEST SERPL-MCNC: 219 MG/DL
CO2 SERPL-SCNC: 30 MMOL/L (ref 21–32)
CREAT SERPL-MCNC: 0.98 MG/DL (ref 0.6–1.3)
DIFFERENTIAL METHOD BLD: ABNORMAL
EOSINOPHIL # BLD: 0.1 K/UL (ref 0–0.4)
EOSINOPHIL NFR BLD: 3 % (ref 0–5)
ERYTHROCYTE [DISTWIDTH] IN BLOOD BY AUTOMATED COUNT: 13.6 % (ref 11.6–14.5)
EST. AVERAGE GLUCOSE BLD GHB EST-MCNC: 148 MG/DL
GLOBULIN SER CALC-MCNC: 3.7 G/DL (ref 2–4)
GLUCOSE SERPL-MCNC: 189 MG/DL (ref 74–99)
HBA1C MFR BLD: 6.8 % (ref 4.2–5.6)
HCT VFR BLD AUTO: 40.9 % (ref 36–48)
HDLC SERPL-MCNC: 34 MG/DL (ref 40–60)
HDLC SERPL: 6.4 {RATIO} (ref 0–5)
HGB BLD-MCNC: 12.2 G/DL (ref 13–16)
IMM GRANULOCYTES # BLD AUTO: 0 K/UL (ref 0–0.04)
IMM GRANULOCYTES NFR BLD AUTO: 0 % (ref 0–0.5)
LDLC SERPL CALC-MCNC: 153.6 MG/DL (ref 0–100)
LIPID PROFILE,FLP: ABNORMAL
LYMPHOCYTES # BLD: 1.5 K/UL (ref 0.9–3.6)
LYMPHOCYTES NFR BLD: 26 % (ref 21–52)
MAGNESIUM SERPL-MCNC: 1.6 MG/DL (ref 1.6–2.6)
MCH RBC QN AUTO: 23.7 PG (ref 24–34)
MCHC RBC AUTO-ENTMCNC: 29.8 G/DL (ref 31–37)
MCV RBC AUTO: 79.4 FL (ref 78–100)
MONOCYTES # BLD: 0.4 K/UL (ref 0.05–1.2)
MONOCYTES NFR BLD: 7 % (ref 3–10)
NEUTS SEG # BLD: 3.5 K/UL (ref 1.8–8)
NEUTS SEG NFR BLD: 63 % (ref 40–73)
NRBC # BLD: 0 K/UL (ref 0–0.01)
NRBC BLD-RTO: 0 PER 100 WBC
PHOSPHATE SERPL-MCNC: 3.5 MG/DL (ref 2.5–4.9)
PLATELET # BLD AUTO: 308 K/UL (ref 135–420)
PMV BLD AUTO: 11.2 FL (ref 9.2–11.8)
POTASSIUM SERPL-SCNC: 4.6 MMOL/L (ref 3.5–5.5)
PROT SERPL-MCNC: 7.3 G/DL (ref 6.4–8.2)
PTH-INTACT SERPL-MCNC: 33.6 PG/ML (ref 18.4–88)
RBC # BLD AUTO: 5.15 M/UL (ref 4.35–5.65)
SODIUM SERPL-SCNC: 139 MMOL/L (ref 136–145)
TRIGL SERPL-MCNC: 157 MG/DL (ref ?–150)
VLDLC SERPL CALC-MCNC: 31.4 MG/DL
WBC # BLD AUTO: 5.5 K/UL (ref 4.6–13.2)

## 2022-10-18 PROCEDURE — 83970 ASSAY OF PARATHORMONE: CPT

## 2022-10-18 PROCEDURE — G0402 INITIAL PREVENTIVE EXAM: HCPCS | Performed by: FAMILY MEDICINE

## 2022-10-18 PROCEDURE — G8510 SCR DEP NEG, NO PLAN REQD: HCPCS | Performed by: FAMILY MEDICINE

## 2022-10-18 PROCEDURE — 36415 COLL VENOUS BLD VENIPUNCTURE: CPT

## 2022-10-18 PROCEDURE — 82306 VITAMIN D 25 HYDROXY: CPT

## 2022-10-18 PROCEDURE — 85025 COMPLETE CBC W/AUTO DIFF WBC: CPT

## 2022-10-18 PROCEDURE — 80061 LIPID PANEL: CPT

## 2022-10-18 PROCEDURE — 83735 ASSAY OF MAGNESIUM: CPT

## 2022-10-18 PROCEDURE — G8536 NO DOC ELDER MAL SCRN: HCPCS | Performed by: FAMILY MEDICINE

## 2022-10-18 PROCEDURE — 84100 ASSAY OF PHOSPHORUS: CPT

## 2022-10-18 PROCEDURE — 80053 COMPREHEN METABOLIC PANEL: CPT

## 2022-10-18 PROCEDURE — 83036 HEMOGLOBIN GLYCOSYLATED A1C: CPT

## 2022-10-18 PROCEDURE — 1123F ACP DISCUSS/DSCN MKR DOCD: CPT | Performed by: FAMILY MEDICINE

## 2022-10-18 PROCEDURE — G8417 CALC BMI ABV UP PARAM F/U: HCPCS | Performed by: FAMILY MEDICINE

## 2022-10-18 PROCEDURE — G8427 DOCREV CUR MEDS BY ELIG CLIN: HCPCS | Performed by: FAMILY MEDICINE

## 2022-10-18 RX ORDER — GUAIFENESIN 100 MG/5ML
162 LIQUID (ML) ORAL DAILY
COMMUNITY

## 2022-10-18 NOTE — ACP (ADVANCE CARE PLANNING)
Advance Care Planning     General Advance Care Planning (ACP) Conversation      Date of Conversation: 10/18/2022  Conducted with: Patient with Decision Making Capacity    Healthcare Decision Maker:     Primary Decision Maker: Luisana Healy - [phone]  Click here to complete [address] including selection of the Healthcare Decision Maker Relationship (ie \"Primary\")      Older sister had a DNR \"I don't know how I feel about that. \"  \"I wouldn't want to go through a whole bunch of other stuff. \"    May consider being an organ donor      Content/Action Overview:   Has NO ACP documents/care preferences - information provided, considering goals and options  Reviewed DNR/DNI and patient elects Full Code (Attempt Resuscitation)         Length of Voluntary ACP Conversation in minutes:  <16 minutes (Non-Billable)    Katelyn Vanegas MD

## 2022-10-18 NOTE — PATIENT INSTRUCTIONS
Medicare Wellness Visit, Male    The best way to live healthy is to have a lifestyle where you eat a well-balanced diet, exercise regularly, limit alcohol use, and quit all forms of tobacco/nicotine, if applicable. Regular preventive services are another way to keep healthy. Preventive services (vaccines, screening tests, monitoring & exams) can help personalize your care plan, which helps you manage your own care. Screening tests can find health problems at the earliest stages, when they are easiest to treat. [facility] follows the current, evidence-based guidelines published by the [facility] ([facility]) when recommending preventive services for our patients. Because we follow these guidelines, sometimes recommendations change over time as research supports it. (For example, a prostate screening blood test is no longer routinely recommended for men with no symptoms). Of course, you and your doctor may decide to screen more often for some diseases, based on your risk and co-morbidities (chronic disease you are already diagnosed with). Preventive services for you include:  - Medicare offers their members a free annual wellness visit, which is time for you and your primary care provider to discuss and plan for your preventive service needs. Take advantage of this benefit every year!  -All adults over age 72 should receive the recommended pneumonia vaccines. Current USPSTF guidelines recommend a series of two vaccines for the best pneumonia protection.   -All adults should have a flu vaccine yearly and tetanus vaccine every 10 years.  -All adults age 48 and older should receive the shingles vaccines (series of two vaccines).        -All adults age 38-68 who are overweight should have a diabetes screening test once every three years.   -Other screening tests & preventive services for persons with diabetes include: an eye exam to screen for diabetic retinopathy, a kidney function test, a foot exam, and stricter control over your cholesterol.   -Cardiovascular screening for adults with routine risk involves an electrocardiogram (ECG) at intervals determined by the provider.   -Colorectal cancer screening should be done for adults age 54-65 with no increased risk factors for colorectal cancer. There are a number of acceptable methods of screening for this type of cancer. Each test has its own benefits and drawbacks. Discuss with your provider what is most appropriate for you during your annual wellness visit. The different tests include: colonoscopy (considered the best screening method), a fecal occult blood test, a fecal DNA test, and sigmoidoscopy.  -All adults born between [facility] should be screened once for Hepatitis C.  -An Abdominal Aortic Aneurysm (AAA) Screening is recommended for men age 73-68 who has ever smoked in their lifetime.      Here is a list of your current Health Maintenance items (your personalized list of preventive services) with a due date:  Health Maintenance Due   Topic Date Due    DTaP/Tdap/Td  (1 - Tdap) Never done    Shingles Vaccine (1 of 2) Never done    Colorectal Screening  05/03/2016    Diabetic Foot Care  10/09/2019    Eye Exam  04/19/2020    Hemoglobin A1C    07/11/2020    Cholesterol Test   07/11/2020    Albumin Urine Test  07/12/2020    COVID-19 Vaccine (3 - Booster for Christina series) 03/04/2022    Yearly Flu Vaccine (1) 08/01/2022

## 2022-10-18 NOTE — ASSESSMENT & PLAN NOTE
CVA 2018 with residual LLE>LUE weakness, stable. Endorses some impact on gait.  Declining PT/PM&R referral

## 2022-10-18 NOTE — PROGRESS NOTES
Chief Complaint   Patient presents with    Annual Wellness Visit       Patient here today to be seen for his annual [address] 1. \"Have you been to the ER, urgent care clinic since your last visit? Hospitalized since your last visit? \" No    2. \"Have you seen or consulted any other health care providers outside of the [address] [place] [address] since your last visit? \" No     3. For patients aged 39-70: Has the patient had a colonoscopy / FIT/ Cologuard? Yes - Care Gap present. Rooming MA/LPN to request most recent results Results have been received and are in 's office for review. If the patient is female:    4. For patients aged 41-77: Has the patient had a mammogram within the past 2 years? NA - based on age or sex      11. For patients aged 21-65: Has the patient had a pap smear?  NA - based on age or sex

## 2022-10-18 NOTE — PROGRESS NOTES
This is a \"Welcome to [employer]"  Initial Preventive Physical Examination (IPPE) providing Personalized Prevention Plan Services (Performed in the first 12 months of enrollment)    I have reviewed the patient's medical history in detail and updated the computerized patient record. Assessment/Plan   Education and counseling provided:  Are appropriate based on today's review and evaluation    1. Welcome to Medicare preventive visit  2. History of CVA with residual deficit  3. Left hemiparesis [facility])  Assessment & Plan:  CVA 2018 with residual LLE>LUE weakness, stable. Endorses some impact on gait. Declining PT/PM&R referral  4. History of prostate cancer  Assessment & Plan:  Has continued annual surveillance with PSA. 5. Diabetic macular edema (HCC)  -     REFERRAL TO OPHTHALMOLOGY  6.  Type 2 diabetes mellitus with both eyes affected by proliferative retinopathy and macular edema, with long-term current use of insulin (HCC)  -     REFERRAL TO OPHTHALMOLOGY     Depression Risk Screen     3 most recent PHQ Screens 10/18/2022   Little interest or pleasure in doing things Not at all   Feeling down, depressed, irritable, or hopeless Not at all   Total Score PHQ 2 0   Trouble falling or staying asleep, or sleeping too much Several days   Feeling tired or having little energy Not at all   Poor appetite, weight loss, or overeating Not at all   Feeling bad about yourself - or that you are a failure or have let yourself or your family down Not at all   Trouble concentrating on things such as school, work, reading, or watching TV Not at all   Moving or speaking so slowly that other people could have noticed; or the opposite being so fidgety that others notice Not at all   Thoughts of being better off dead, or hurting yourself in some way Not at all   PHQ 9 Score 1   How difficult have these problems made it for you to do your work, take care of your home and get along with others Not difficult at all       Alcohol & Drug Abuse Risk Screen    Do you average more than 1 drink per night or more than 7 drinks a week: No    In the past three months have you have had more than 4 drinks containing alcohol on one occasion: No          Functional Ability and Level of Safety    Diet: No special diet      Hearing: Hearing is good. Vision Screening:  Vision is The patient needs further evaluation. Vision Screening    Right eye Left eye Both eyes   Without correction 20/25 20/40 20/25   With correction            Activities of Daily Living: The home contains: grab bars  Patient does total self care      Ambulation: with no difficulty      Exercise level: sedentary     Fall Risk Screen:  Fall Risk Assessment, last 12 mths 10/18/2022   Able to walk? Yes   Fall in past 12 months? 0   Do you feel unsteady? 1   Are you worried about falling 0      Abuse Screen:  Patient is not abused       Screening EKG   EKG order placed: No    End of Life Planning   Advanced care planning directives were discussed with the patient and /or family/caregiver.      Health Maintenance Due     Health Maintenance Due   Topic Date Due    DTaP/Tdap/Td series (1 - Tdap) Never done    Shingrix Vaccine Age 50> (1 of 2) Never done    Colorectal Cancer Screening Combo  05/03/2016    Foot Exam Q1  10/09/2019    Eye Exam Retinal or Dilated  04/19/2020    A1C test (Diabetic or Prediabetic)  07/11/2020    Lipid Screen  07/11/2020    MICROALBUMIN Q1  07/12/2020    COVID-19 Vaccine (3 - Booster for Christina series) 03/04/2022    Flu Vaccine (1) 08/01/2022       Patient Care Team   Patient Care Team:  Ermelinda Downs MD as PCP - General (Family Medicine)  Ermelinda Downs MD as PCP - [facility] Empaneled Provider  Jearl Rubinstein, MD (Ophthalmology)    History     Past Medical History:   Diagnosis Date    Acute pancreatitis 04/10/2018    [facility], no trigger identified, gallbladder \"sludge\"    DM (diabetes mellitus) ([facility] 3/15/2010    Elevated cholesterol with high triglycerides 3/15/2010    History of CVA with residual deficit 10/29/2018    [facility] 10/25/2018: right MCA territory, right anterior choroidal artery, LLE>LUE weakness, no cranial nerve involvement. 2018: Kristopher Fonseca MD: stop ASA, add plavix    HTN (hypertension), benign 3/15/2010    Obesity 3/15/2010    Personal history of prostate cancer     Prostate cancer ([address] 5/3/2011      Past Surgical History:   Procedure Laterality Date    HX UROLOGICAL  2010    Prostate seed implant - Dr. Rin Fuentes & Dr. Nahed Gama at [facility]. Current Outpatient Medications   Medication Sig Dispense Refill    aspirin (Tre Chewable Aspirin) 81 mg chewable tablet Take 162 mg by mouth daily. tadalafiL (CIALIS) 20 mg tablet Take 20 mg by mouth daily as needed for Erectile Dysfunction. amLODIPine (NORVASC) 2.5 mg tablet Take 1 Tab by mouth daily. (Patient taking differently: Take 5 mg by mouth daily.) 90 Tab 4    furosemide (LASIX) 40 mg tablet Take 1 Tab by mouth daily. 60 Tab 1    losartan (COZAAR) 100 mg tablet Take 1 Tab by mouth daily. 90 Tab 4    atorvastatin (LIPITOR) 40 mg tablet Take 1 Tab by mouth daily. 90 Tab 4    insulin aspart U-100 (NOVOLOG) 100 unit/mL (3 mL) inpn 12 Units by SubCUTAneous route. insulin glargine (LANTUS SOLOSTAR U-100 INSULIN) 100 unit/mL (3 mL) inpn 35 Units by SubCUTAneous route daily. (Patient taking differently: 52 Units by SubCUTAneous route daily.) 5 Pen 0    ergocalciferol (VITAMIN D2) 50,000 unit capsule Take 1 Cap by mouth two (2) times a week. 40 Cap 1    metFORMIN (GLUCOPHAGE) 1,000 mg tablet TAKE ONE TABLET BY MOUTH TWICE DAILY WITH MEALS 180 Tab 3     Allergies   Allergen Reactions    Byetta [Exenatide] Other (comments)     Pancreatits    Lisinopril Cough       History reviewed. No pertinent family history.   Social History     Tobacco Use    Smoking status: Former     Types: Cigarettes     Quit date: 1995     Years since quittin.8 Smokeless tobacco: Never   Substance Use Topics    Alcohol use:  No

## 2022-10-26 ENCOUNTER — OFFICE VISIT (OUTPATIENT)
Dept: NEPHROLOGY | Age: 67
End: 2022-10-26
Payer: MEDICARE

## 2022-10-26 VITALS
BODY MASS INDEX: 35.32 KG/M2 | DIASTOLIC BLOOD PRESSURE: 66 MMHG | SYSTOLIC BLOOD PRESSURE: 152 MMHG | HEART RATE: 89 BPM | WEIGHT: 282.6 LBS

## 2022-10-26 DIAGNOSIS — E66.01 SEVERE OBESITY (BMI 35.0-39.9) WITH COMORBIDITY (HCC): ICD-10-CM

## 2022-10-26 DIAGNOSIS — R80.1 PERSISTENT PROTEINURIA: Primary | ICD-10-CM

## 2022-10-26 DIAGNOSIS — R80.1 PERSISTENT PROTEINURIA: ICD-10-CM

## 2022-10-26 PROCEDURE — G8432 DEP SCR NOT DOC, RNG: HCPCS | Performed by: INTERNAL MEDICINE

## 2022-10-26 PROCEDURE — 99204 OFFICE O/P NEW MOD 45 MIN: CPT | Performed by: INTERNAL MEDICINE

## 2022-10-26 PROCEDURE — G8417 CALC BMI ABV UP PARAM F/U: HCPCS | Performed by: INTERNAL MEDICINE

## 2022-10-26 PROCEDURE — 3074F SYST BP LT 130 MM HG: CPT | Performed by: INTERNAL MEDICINE

## 2022-10-26 PROCEDURE — G8754 DIAS BP LESS 90: HCPCS | Performed by: INTERNAL MEDICINE

## 2022-10-26 PROCEDURE — G8536 NO DOC ELDER MAL SCRN: HCPCS | Performed by: INTERNAL MEDICINE

## 2022-10-26 PROCEDURE — 1101F PT FALLS ASSESS-DOCD LE1/YR: CPT | Performed by: INTERNAL MEDICINE

## 2022-10-26 PROCEDURE — G8753 SYS BP > OR = 140: HCPCS | Performed by: INTERNAL MEDICINE

## 2022-10-26 PROCEDURE — G8427 DOCREV CUR MEDS BY ELIG CLIN: HCPCS | Performed by: INTERNAL MEDICINE

## 2022-10-26 PROCEDURE — 3017F COLORECTAL CA SCREEN DOC REV: CPT | Performed by: INTERNAL MEDICINE

## 2022-10-26 PROCEDURE — 1123F ACP DISCUSS/DSCN MKR DOCD: CPT | Performed by: INTERNAL MEDICINE

## 2022-10-26 PROCEDURE — 3078F DIAST BP <80 MM HG: CPT | Performed by: INTERNAL MEDICINE

## 2022-10-26 NOTE — PROGRESS NOTES
Nephrology Consult    Patient: Rosenda Knight MRN: [id number]  SSN: [id number]    YOB: 1955  Age: 73 y.o. Sex: male      Subjective:   Reason for the consultation. Proteinuria  History of present illness. The patient is 46-year-old man with underlying history of diabetes mellitus type 2, hypertension was referred for the evaluation of proteinuria. He has normal kidney function with creatinine 0.98 on 10/18/2022. He is noticed to have minimal bilateral lower extremity swelling and takes Lasix 40 mg daily. No abdominal pain nausea vomiting or loose stools. No voiding issues gross hematuria. No chest complaints. Past Medical History:   Diagnosis Date    Acute pancreatitis 04/10/2018    [facility], no trigger identified, gallbladder \"sludge\"    DM (diabetes mellitus) ([facility] 3/15/2010    Elevated cholesterol with high triglycerides 3/15/2010    History of CVA with residual deficit 10/29/2018    [facility] 10/25/2018: right MCA territory, right anterior choroidal artery, LLE>LUE weakness, no cranial nerve involvement. 2018: Norma Garcia MD: stop ASA, add plavix    HTN (hypertension), benign 3/15/2010    Obesity 3/15/2010    Personal history of prostate cancer     Prostate cancer ([facility] 5/3/2011     Past Surgical History:   Procedure Laterality Date    HX UROLOGICAL  2010    Prostate seed implant - Dr. Hattie Velasco & Dr. Carly Palacios at [facility]. History reviewed. No pertinent family history. Social History     Tobacco Use    Smoking status: Former     Types: Cigarettes     Quit date: 1995     Years since quittin.8    Smokeless tobacco: Never   Substance Use Topics    Alcohol use: No      Current Outpatient Medications   Medication Sig Dispense Refill    aspirin 81 mg chewable tablet Take 162 mg by mouth daily. tadalafiL (CIALIS) 20 mg tablet Take 20 mg by mouth daily as needed for Erectile Dysfunction.       amLODIPine (NORVASC) 2.5 mg tablet Take 1 Tab by mouth daily. (Patient taking differently: Take 5 mg by mouth daily.) 90 Tab 4    furosemide (LASIX) 40 mg tablet Take 1 Tab by mouth daily. 60 Tab 1    losartan (COZAAR) 100 mg tablet Take 1 Tab by mouth daily. 90 Tab 4    atorvastatin (LIPITOR) 40 mg tablet Take 1 Tab by mouth daily. 90 Tab 4    insulin aspart U-100 (NOVOLOG) 100 unit/mL (3 mL) inpn 12 Units by SubCUTAneous route. insulin glargine (LANTUS SOLOSTAR U-100 INSULIN) 100 unit/mL (3 mL) inpn 35 Units by SubCUTAneous route daily. (Patient taking differently: 52 Units by SubCUTAneous route daily.) 5 Pen 0    ergocalciferol (VITAMIN D2) 50,000 unit capsule Take 1 Cap by mouth two (2) times a week. 40 Cap 1    metFORMIN (GLUCOPHAGE) 1,000 mg tablet TAKE ONE TABLET BY MOUTH TWICE DAILY WITH MEALS 180 Tab 3        Allergies   Allergen Reactions    Byetta [Exenatide] Other (comments)     Pancreatits    Lisinopril Cough       Review of Systems:  A comprehensive review of systems was negative except for that written in the History of Present Illness. Objective:     Vitals:    10/26/22 1014   BP: (!) 152/66   Pulse: 89   Weight: 128.2 kg (282 lb 9.6 oz)        Physical Exam:  General: NAD  Eyes: sclera anicteric  Oral Cavity: No thrush or ulcers  Neck: no JVD  Chest: Fair bilateral air entry  Heart: normal sounds  Abdomen: soft and non tender   : no huerta  Lower Extremities: no edema  Skin: no rash  Neuro: intact  Psychiatric: non-depressed            Assessment:     Hospital Problems  Date Reviewed: 10/18/2022   None      Plan:   1 proteinuria. -Etiology can be diabetic nephropathy.    -He was referred for the evaluation of proteinuria. -I will quantify for proteinuria.     -I will order urinalysis to look at the urine sediment.    -I will check serum albumin level.    -He is noticed to have minimal bilateral lower extremity swelling.    -He takes Lasix 40 mg daily.    -His kidney functions are normal with creatinine 0.98 on 10/18/2022.    -I will see him back in 6 weeks. 2.  Hypertension.    -Blood pressure is okay. He takes losartan 100 mg daily which I will continue. 3.  Edema. Bilateral lower extremity.    -Etiology can be fluid overload/CHF/nephrotic syndrome. He takes Lasix 40 mg daily. 4.  Diabetes mellitus type 2. He takes insulin.     Thank you for the consultation    Signed By: Adri Aponte MD     October 26, 2022

## 2022-10-26 NOTE — PROGRESS NOTES
Austen Mcgraw presents today for   Chief Complaint   Patient presents with    New Patient       Is someone accompanying this pt? no    Is the patient using any DME equipment during OV? no    Depression Screening:  3 most recent PHQ Screens 10/26/2022   Little interest or pleasure in doing things Not at all   Feeling down, depressed, irritable, or hopeless Not at all   Total Score PHQ 2 0   Trouble falling or staying asleep, or sleeping too much -   Feeling tired or having little energy -   Poor appetite, weight loss, or overeating -   Feeling bad about yourself - or that you are a failure or have let yourself or your family down -   Trouble concentrating on things such as school, work, reading, or watching TV -   Moving or speaking so slowly that other people could have noticed; or the opposite being so fidgety that others notice -   Thoughts of being better off dead, or hurting yourself in some way -   PHQ 9 Score -   How difficult have these problems made it for you to do your work, take care of your home and get along with others -       Learning Assessment:  Learning Assessment 5/11/2018   PRIMARY LEARNER Patient   HIGHEST LEVEL OF EDUCATION - PRIMARY LEARNER  > 4 YEARS OF COLLEGE   BARRIERS PRIMARY LEARNER NONE   CO-LEARNER CAREGIVER -   PRIMARY LANGUAGE ENGLISH   LEARNER PREFERENCE PRIMARY LISTENING   ANSWERED BY patient    RELATIONSHIP SELF       Fall Risk  Fall Risk Assessment, last 12 mths 10/26/2022   Able to walk? Yes   Fall in past 12 months? 0   Do you feel unsteady? 0   Are you worried about falling 0       Coordination of Care:  1. Have you been to the ER, urgent care clinic since your last visit? Hospitalized since your last visit? no    2. Have you seen or consulted any other health care providers outside of the [address] [place] [address] since your last visit? Include any pap smears or colon screening.  Yes, PCP Trudy Nunez

## 2022-10-27 ENCOUNTER — TELEPHONE (OUTPATIENT)
Dept: FAMILY MEDICINE CLINIC | Age: 67
End: 2022-10-27

## 2022-10-27 NOTE — TELEPHONE ENCOUNTER
Called patient to see if he had been contacted/scheduled for his study with the [facility] no answer left message asking that he call the office back.

## 2022-10-27 NOTE — TELEPHONE ENCOUNTER
Pt called to speak to "[employer] after discovering that she was in patient care at the moment pt hung up before I could inform him. Please review and advise as soon as possible.

## 2022-10-28 NOTE — TELEPHONE ENCOUNTER
Called patient no answer left message letting him know I had been trying to get in contact with him to see if had been [facility] to [facility] carotid [facility] he has appt with Dr. Viviane Ruby on 11/1 to go over those results as well as labs. Asked that he call the office back, office number left on his VM.

## 2022-10-31 ENCOUNTER — TELEPHONE (OUTPATIENT)
Dept: FAMILY MEDICINE CLINIC | Age: 67
End: 2022-10-31

## 2022-10-31 NOTE — TELEPHONE ENCOUNTER
Called patient  verified he says he has not been contacted by the [place] in reference to his study yet. He has cancelled his appt for tomorrow and told I will f/u on status with the [place] and will call him back.

## 2022-10-31 NOTE — TELEPHONE ENCOUNTER
----- Message from Maria Doloresubaldo Lynch sent at 10/31/2022  9:50 AM EDT -----  Subject: Message to Provider    QUESTIONS  Information for Provider? Pt would like Bert Allen to call him regarding his   appt he has scheduled for tomorrow. ---------------------------------------------------------------------------  --------------  Gen HAMILTON  [phone]; OK to leave message on voicemail  ---------------------------------------------------------------------------  --------------  SCRIPT ANSWERS  Relationship to Patient?  Self

## 2022-11-11 NOTE — TELEPHONE ENCOUNTER
Called patient to see if he has been contacted  by [facility] to [facility] his Coratid study no answer left message asking him to call the office back, office number has been left.

## 2022-11-14 NOTE — TELEPHONE ENCOUNTER
Patient called the office back stating he has not been contacted by [facility] For neither his referral or his study. Told patient I will f/u with the [facility].

## 2022-11-14 NOTE — TELEPHONE ENCOUNTER
Called patient no answer left message letting him know I was just calling to see if he had been contacted bu the [facility] to set up appts for referral as well as the Carotid study. Asked that he call me back at the office.

## 2022-11-15 NOTE — TELEPHONE ENCOUNTER
----- Message from Chikis Bynum sent at 11/14/2022  4:13 PM EST -----  Subject: Message to Provider    QUESTIONS  Information for Provider? pt was calling to speak with misael no info was   given.   ---------------------------------------------------------------------------  --------------  Gen Pool INFO  [phone]; OK to leave message on voicemail  ---------------------------------------------------------------------------  --------------  SCRIPT ANSWERS  Relationship to Patient?  Self

## 2022-11-29 ENCOUNTER — TELEPHONE (OUTPATIENT)
Dept: FAMILY MEDICINE CLINIC | Age: 67
End: 2022-11-29

## 2022-11-29 NOTE — TELEPHONE ENCOUNTER
Patient says he was seen by a vascular specialists at the [address] [place] [address] did an exam on him and determined that he did have some blood clots. The Vascular specialists would like to schedule imaging for patient in January however patient says he feels that is too long of a wait. He is asking if Dr. Rodriguez Campbell can refer him to a civilian vascular specialists instead. He was told we would still need to get the referral approved through the [facility], for him to be seen by a civilian specialists. Patient has expressed understanding, told patient I will work on getting the visit notes from the specialists he saw today at the [facility]. And will keep him posted on the progress.

## 2022-11-29 NOTE — TELEPHONE ENCOUNTER
Patient called to make Sawyer Conn aware that he has an appt with a VAS doctor today at the [address] [facility]. He can be reached at [phone].

## 2022-12-02 ENCOUNTER — OFFICE VISIT (OUTPATIENT)
Dept: FAMILY MEDICINE CLINIC | Age: 67
End: 2022-12-02
Payer: MEDICARE

## 2022-12-02 ENCOUNTER — TELEPHONE (OUTPATIENT)
Dept: FAMILY MEDICINE CLINIC | Age: 67
End: 2022-12-02

## 2022-12-02 VITALS
OXYGEN SATURATION: 99 % | HEIGHT: 75 IN | DIASTOLIC BLOOD PRESSURE: 62 MMHG | TEMPERATURE: 98 F | SYSTOLIC BLOOD PRESSURE: 128 MMHG | HEART RATE: 78 BPM | WEIGHT: 283 LBS | RESPIRATION RATE: 10 BRPM | BODY MASS INDEX: 35.19 KG/M2

## 2022-12-02 DIAGNOSIS — E55.9 VITAMIN D DEFICIENCY: ICD-10-CM

## 2022-12-02 DIAGNOSIS — E11.42 TYPE 2 DIABETES MELLITUS WITH DIABETIC POLYNEUROPATHY, WITH LONG-TERM CURRENT USE OF INSULIN (HCC): ICD-10-CM

## 2022-12-02 DIAGNOSIS — I65.22 STENOSIS OF LEFT CAROTID ARTERY: ICD-10-CM

## 2022-12-02 DIAGNOSIS — E11.3513 TYPE 2 DIABETES MELLITUS WITH BOTH EYES AFFECTED BY PROLIFERATIVE RETINOPATHY AND MACULAR EDEMA, WITH LONG-TERM CURRENT USE OF INSULIN (HCC): ICD-10-CM

## 2022-12-02 DIAGNOSIS — Z79.4 TYPE 2 DIABETES MELLITUS WITH DIABETIC POLYNEUROPATHY, WITH LONG-TERM CURRENT USE OF INSULIN (HCC): ICD-10-CM

## 2022-12-02 DIAGNOSIS — I10 ESSENTIAL HYPERTENSION: ICD-10-CM

## 2022-12-02 DIAGNOSIS — E11.21 TYPE 2 DIABETES WITH NEPHROPATHY (HCC): Primary | ICD-10-CM

## 2022-12-02 DIAGNOSIS — E11.21 DIABETIC NEPHROPATHY ASSOCIATED WITH TYPE 2 DIABETES MELLITUS (HCC): ICD-10-CM

## 2022-12-02 DIAGNOSIS — Z79.4 TYPE 2 DIABETES MELLITUS WITH BOTH EYES AFFECTED BY PROLIFERATIVE RETINOPATHY AND MACULAR EDEMA, WITH LONG-TERM CURRENT USE OF INSULIN (HCC): ICD-10-CM

## 2022-12-02 DIAGNOSIS — E78.5 HYPERLIPIDEMIA, UNSPECIFIED HYPERLIPIDEMIA TYPE: ICD-10-CM

## 2022-12-02 DIAGNOSIS — E11.311 DIABETIC MACULAR EDEMA (HCC): ICD-10-CM

## 2022-12-02 DIAGNOSIS — I69.30 HISTORY OF CVA WITH RESIDUAL DEFICIT: ICD-10-CM

## 2022-12-02 PROCEDURE — 1123F ACP DISCUSS/DSCN MKR DOCD: CPT | Performed by: FAMILY MEDICINE

## 2022-12-02 PROCEDURE — 2022F DILAT RTA XM EVC RTNOPTHY: CPT | Performed by: FAMILY MEDICINE

## 2022-12-02 PROCEDURE — 3017F COLORECTAL CA SCREEN DOC REV: CPT | Performed by: FAMILY MEDICINE

## 2022-12-02 PROCEDURE — 3044F HG A1C LEVEL LT 7.0%: CPT | Performed by: FAMILY MEDICINE

## 2022-12-02 PROCEDURE — G8536 NO DOC ELDER MAL SCRN: HCPCS | Performed by: FAMILY MEDICINE

## 2022-12-02 PROCEDURE — 1101F PT FALLS ASSESS-DOCD LE1/YR: CPT | Performed by: FAMILY MEDICINE

## 2022-12-02 PROCEDURE — G8417 CALC BMI ABV UP PARAM F/U: HCPCS | Performed by: FAMILY MEDICINE

## 2022-12-02 PROCEDURE — 99214 OFFICE O/P EST MOD 30 MIN: CPT | Performed by: FAMILY MEDICINE

## 2022-12-02 PROCEDURE — 3074F SYST BP LT 130 MM HG: CPT | Performed by: FAMILY MEDICINE

## 2022-12-02 PROCEDURE — G8510 SCR DEP NEG, NO PLAN REQD: HCPCS | Performed by: FAMILY MEDICINE

## 2022-12-02 PROCEDURE — G8427 DOCREV CUR MEDS BY ELIG CLIN: HCPCS | Performed by: FAMILY MEDICINE

## 2022-12-02 PROCEDURE — G8754 DIAS BP LESS 90: HCPCS | Performed by: FAMILY MEDICINE

## 2022-12-02 PROCEDURE — 3078F DIAST BP <80 MM HG: CPT | Performed by: FAMILY MEDICINE

## 2022-12-02 PROCEDURE — G8752 SYS BP LESS 140: HCPCS | Performed by: FAMILY MEDICINE

## 2022-12-02 RX ORDER — ATORVASTATIN CALCIUM 80 MG/1
80 TABLET, FILM COATED ORAL DAILY
Qty: 90 TABLET | Refills: 4 | Status: SHIPPED | OUTPATIENT
Start: 2022-12-02

## 2022-12-02 RX ORDER — INSULIN ASPART 100 [IU]/ML
14 INJECTION, SOLUTION INTRAVENOUS; SUBCUTANEOUS 2 TIMES DAILY WITH MEALS
Qty: 5 PEN | Refills: 11 | Status: SHIPPED | OUTPATIENT
Start: 2022-12-02

## 2022-12-02 RX ORDER — AMLODIPINE BESYLATE 5 MG/1
5 TABLET ORAL DAILY
Qty: 90 TABLET | Refills: 4 | Status: SHIPPED | OUTPATIENT
Start: 2022-12-02

## 2022-12-02 RX ORDER — BLOOD SUGAR DIAGNOSTIC
1 STRIP MISCELLANEOUS DAILY
Qty: 100 PEN NEEDLE | Refills: 11 | Status: SHIPPED | OUTPATIENT
Start: 2022-12-02

## 2022-12-02 RX ORDER — INSULIN GLARGINE 100 [IU]/ML
52 INJECTION, SOLUTION SUBCUTANEOUS DAILY
Qty: 25 PEN | Refills: 11 | Status: SHIPPED | OUTPATIENT
Start: 2022-12-02

## 2022-12-02 NOTE — TELEPHONE ENCOUNTER
Called Vascular Clinic at [facility] [place] was on hold for 8 mins will call back later.

## 2022-12-02 NOTE — TELEPHONE ENCOUNTER
Message  Received: Today  Krissy Villavicencio MD  Kenly Screen, I am increasing his lipitor (atorvastatin) from 40 to 80 based on carotid artery issues and his diabetes. Please inform. I'm happy to answer any questions. Thanks, KJS           Called patient no answer left message letting him know Dr. Gretchen Burr asked that I reach out to him as she has decided to make some changes to his medications due to the carotid issue he is having, asked that he call me back as soon as possible, office number left.

## 2022-12-02 NOTE — PROGRESS NOTES
Chief Complaint   Patient presents with    Diabetes    Chronic Kidney Disease    Hypertension    Vitamin D Deficiency     Pt in office for chronic condition follow up            1. \"Have you been to the ER, urgent care clinic since your last visit? Hospitalized since your last visit? \" No    2. \"Have you seen or consulted any other health care providers outside of the [address] [place] [address] since your last visit? \" No     3. For patients aged 39-70: Has the patient had a colonoscopy / FIT/ Cologuard?  Yes - no Care Gap present

## 2022-12-02 NOTE — Clinical Note
Emely Vazquez, I am increasing his lipitor (atorvastatin) from 40 to 80 based on carotid artery issues and his diabetes. Please inform. I'm happy to answer any questions.  Thanks, ANA LILIA

## 2022-12-02 NOTE — PROGRESS NOTES
Dakota Barry (: 1955) is a 78 y.o. male, established patient, here for:    ASSESSMENT/PLAN:  1. Type 2 diabetes with nephropathy ([facility] [address]  Assessment & Plan:  Well controlled with A1c 6.8 but with very high insulin requirement (80 units daily). Had previously done well with dulaglutide, without adverse effects, even with prior history of pancreatitis on Byetta. Dulaglutide currently on back order. Continue present management with insulin and metformin. Hopefully will be available at follow up in 3 months    Orders:  -     insulin aspart U-100 (NOVOLOG) 100 unit/mL (3 mL) inpn; 14 Units by SubCUTAneous route two (2) times daily (with meals). , Normal, Disp-5 Pen, R-11  -     insulin glargine (Lantus Solostar U-100 Insulin) 100 unit/mL (3 mL) inpn; 52 Units by SubCUTAneous route daily. , Normal, Disp-25 Pen, R-11  -     Insulin Needles, Disposable, (Novofine 32) 32 gauge x 1/4\" ndle; 1 Device by Does Not Apply route daily. , Normal, Disp-100 Pen Needle, R-11  -     HEMOGLOBIN A1C WITH EAG; Future  -     METABOLIC PANEL, BASIC; Future  2. Type 2 diabetes mellitus with both eyes affected by proliferative retinopathy and macular edema, with long-term current use of insulin (HCC)  3. Diabetic macular edema ([facility] [address]  Assessment & Plan:  Strongly encouraged to schedule eye exam. He will feel fine right up until he loses his sight. Understood   4. Stenosis of left carotid artery  Assessment & Plan:  Presumptive based on description. CT pending for further characterization. Referring to Dr. Juana Black for second opinion. Bring dopplers and CT. Orders:  -     REFERRAL TO VASCULAR SURGERY  5. History of CVA with residual deficit  -     REFERRAL TO VASCULAR SURGERY  6. Essential hypertension  Assessment & Plan:  Well controlled, continue present management amlodipine 5, losartan 100 mg   Orders:  -     amLODIPine (NORVASC) 5 mg tablet; Take 1 Tablet by mouth daily. , Normal, Disp-90 Tablet, R-4  7.  Type 2 diabetes mellitus with diabetic polyneuropathy, with long-term current use of insulin (HCC)  -     atorvastatin (LIPITOR) 80 mg tablet; Take 1 Tablet by mouth daily. , Normal, Disp-90 Tablet, R-4  8. Hyperlipidemia, unspecified hyperlipidemia type  Assessment & Plan:  LDL uncontrolled in context DM and CV disease. Increasing atorva to 80 mg   9. Vitamin D deficiency  Assessment & Plan:  Well controlled, continue present management    10. Diabetic nephropathy associated with type 2 diabetes mellitus [facility])  Assessment & Plan:  workup in progress by Dr. Don Chew       COVID/flu vaccines recommended    Return in about 3 months (around 3/2/2023) for chronic medical conditions, labs 1 week prior, (30). SUBJECTIVE/OBJECTIVE:  HPI  [facility] \"issues\" with left carotid artery identified. CT recommended.    follow up DM2/obesity with nephropathy, retinopathy and macular edema  follow up CKD with excessive proteinuria - nephropathy   follow up hypertension context history of CVA with residual left sided weakness  follow up hypomag -   follow up vit d def -    Results for orders placed or performed during the hospital encounter of 10/18/22   HEMOGLOBIN A1C WITH EAG   Result Value Ref Range    Hemoglobin A1c 6.8 (H) 4.2 - 5.6 %    Est. average glucose 148 mg/dL   LIPID PANEL   Result Value Ref Range    LIPID PROFILE          Cholesterol, total 219 (H) <200 MG/DL    Triglyceride 157 (H) <150 MG/DL    HDL Cholesterol 34 (L) 40 - 60 MG/DL    LDL, calculated 153.6 (H) 0 - 100 MG/DL    VLDL, calculated 31.4 MG/DL    CHOL/HDL Ratio 6.4 (H) 0 - 5.0     MAGNESIUM   Result Value Ref Range    Magnesium 1.6 1.6 - 2.6 mg/dL   PTH INTACT   Result Value Ref Range    Calcium 9.6 8.5 - 10.1 MG/DL    PTH, Intact 33.6 18.4 - 88.0 pg/mL   PHOSPHORUS   Result Value Ref Range    Phosphorus 3.5 2.5 - 4.9 MG/DL   METABOLIC PANEL, COMPREHENSIVE   Result Value Ref Range    Sodium 139 136 - 145 mmol/L    Potassium 4.6 3.5 - 5.5 mmol/L    Chloride 104 100 - 111 mmol/L CO2 30 21 - 32 mmol/L    Anion gap 5 3.0 - 18 mmol/L    Glucose 189 (H) 74 - 99 mg/dL    BUN 15 7.0 - 18 MG/DL    Creatinine 0.98 0.6 - 1.3 MG/DL    BUN/Creatinine ratio 15 12 - 20      eGFR >60 >60 ml/min/1.73m2    Calcium 9.6 8.5 - 10.1 MG/DL    Bilirubin, total 0.3 0.2 - 1.0 MG/DL    ALT (SGPT) 30 16 - 61 U/L    AST (SGOT) 17 10 - 38 U/L    Alk. phosphatase 55 45 - 117 U/L    Protein, total 7.3 6.4 - 8.2 g/dL    Albumin 3.6 3.4 - 5.0 g/dL    Globulin 3.7 2.0 - 4.0 g/dL    A-G Ratio 1.0 0.8 - 1.7     VITAMIN D, 25 HYDROXY   Result Value Ref Range    Vitamin D 25-Hydroxy 42.7 30 - 100 ng/mL   CBC WITH AUTOMATED DIFF   Result Value Ref Range    WBC 5.5 4.6 - 13.2 K/uL    RBC 5.15 4.35 - 5.65 M/uL    HGB 12.2 (L) 13.0 - 16.0 g/dL    HCT 40.9 36.0 - 48.0 %    MCV 79.4 78.0 - 100.0 FL    MCH 23.7 (L) 24.0 - 34.0 PG    MCHC 29.8 (L) 31.0 - 37.0 g/dL    RDW 13.6 11.6 - 14.5 %    PLATELET 044 830 - 438 K/uL    MPV 11.2 9.2 - 11.8 FL    NRBC 0.0 0  WBC    ABSOLUTE NRBC 0.00 0.00 - 0.01 K/uL    NEUTROPHILS 63 40 - 73 %    LYMPHOCYTES 26 21 - 52 %    MONOCYTES 7 3 - 10 %    EOSINOPHILS 3 0 - 5 %    BASOPHILS 1 0 - 2 %    IMMATURE GRANULOCYTES 0 0.0 - 0.5 %    ABS. NEUTROPHILS 3.5 1.8 - 8.0 K/UL    ABS. LYMPHOCYTES 1.5 0.9 - 3.6 K/UL    ABS. MONOCYTES 0.4 0.05 - 1.2 K/UL    ABS. EOSINOPHILS 0.1 0.0 - 0.4 K/UL    ABS. BASOPHILS 0.1 0.0 - 0.1 K/UL    ABS. IMM. GRANS. 0.0 0.00 - 0.04 K/UL    DF AUTOMATED       Lab Results   Component Value Date/Time    Iron 77 10/02/2018 12:00 PM    TIBC 284 10/02/2018 12:00 PM    Iron % saturation 27 10/02/2018 12:00 PM    Ferritin 239 10/02/2018 12:00 PM       Prior to Admission medications    Medication Sig Start Date End Date Taking? Authorizing Provider   insulin aspart U-100 (NOVOLOG) 100 unit/mL (3 mL) inpn 14 Units by SubCUTAneous route two (2) times daily (with meals).  12/2/22  Yes Krissy Villavicencio MD   insulin glargine (Lantus Solostar U-100 Insulin) 100 unit/mL (3 mL) inpn 52 Units by SubCUTAneous route daily. 12/2/22  Yes Annabell Severe, MD   Insulin Needles, Disposable, (Novofine 32) 32 gauge x 1/4\" ndle 1 Device by Does Not Apply route daily. 12/2/22  Yes Annabell Severe, MD   amLODIPine (NORVASC) 5 mg tablet Take 1 Tablet by mouth daily. 12/2/22  Yes Annabell Severe, MD   atorvastatin (LIPITOR) 80 mg tablet Take 1 Tablet by mouth daily. 12/2/22  Yes Annabell Severe, MD   aspirin 81 mg chewable tablet Take 162 mg by mouth daily. Yes Provider, Historical   tadalafiL (CIALIS) 20 mg tablet Take 20 mg by mouth daily as needed for Erectile Dysfunction. Yes Provider, Historical   furosemide (LASIX) 40 mg tablet Take 1 Tab by mouth daily. 9/12/19  Yes Annabell Severe, MD   losartan (COZAAR) 100 mg tablet Take 1 Tab by mouth daily. 9/12/19  Yes Annabell Severe, MD   ergocalciferol (VITAMIN D2) 50,000 unit capsule Take 1 Cap by mouth two (2) times a week. 11/16/18  Yes Annabell Severe, MD   metFORMIN (GLUCOPHAGE) 1,000 mg tablet TAKE ONE TABLET BY MOUTH TWICE DAILY WITH MEALS 6/16/14  Yes Sherrell Martinez MD   amLODIPine (NORVASC) 2.5 mg tablet Take 1 Tab by mouth daily. Patient taking differently: Take 5 mg by mouth daily. 9/12/19 12/2/22  Annabell Severe, MD   atorvastatin (LIPITOR) 40 mg tablet Take 1 Tab by mouth daily. 3/29/19 12/2/22  Annabell Severe, MD   insulin aspart U-100 (NOVOLOG) 100 unit/mL (3 mL) inpn 14 Units by SubCUTAneous route. 12/2/22  Provider, Historical   insulin glargine (LANTUS SOLOSTAR U-100 INSULIN) 100 unit/mL (3 mL) inpn 35 Units by SubCUTAneous route daily. Patient taking differently: 52 Units by SubCUTAneous route daily.  11/16/18 12/2/22  Annabell Severe, MD       Physical Exam          -- Taras Dancer, MD

## 2022-12-02 NOTE — ASSESSMENT & PLAN NOTE
Well controlled with A1c 6.8 but with very high insulin requirement (80 units daily). Had previously done well with dulaglutide, without adverse effects, even with prior history of pancreatitis on Byetta. Dulaglutide currently on back order. Continue present management with insulin and metformin.  Hopefully will be available at follow up in 3 months

## 2022-12-02 NOTE — ASSESSMENT & PLAN NOTE
Presumptive based on description. CT pending for further characterization. Referring to Dr. Lam Venegas for second opinion. Bring dopplers and CT.

## 2022-12-02 NOTE — TELEPHONE ENCOUNTER
Patient called the office back and has been made aware of change to his medication. He says he was able to  the 80 mg of Atorvastatin and will start taking this. He did not have any further questions or concerns at this time.

## 2022-12-15 ENCOUNTER — TELEPHONE (OUTPATIENT)
Dept: FAMILY MEDICINE CLINIC | Age: 67
End: 2022-12-15

## 2022-12-15 NOTE — TELEPHONE ENCOUNTER
Lucas Vivas from Dr. Josué León office called requesting patient's Ct and Ultrasound and CT results. Please review and advise. She can be reached at [phone].

## 2022-12-19 NOTE — TELEPHONE ENCOUNTER
Valarie Bañuelos she has been made aware we do not currently have access to the resutls it has been sent out to our central scanning. She says she will contact patient to get him scheduled and will ask him to bring copies with him.

## 2023-01-18 ENCOUNTER — OFFICE VISIT (OUTPATIENT)
Dept: NEPHROLOGY | Age: 68
End: 2023-01-18
Payer: MEDICARE

## 2023-01-18 VITALS
BODY MASS INDEX: 35.7 KG/M2 | SYSTOLIC BLOOD PRESSURE: 142 MMHG | DIASTOLIC BLOOD PRESSURE: 68 MMHG | HEART RATE: 90 BPM | WEIGHT: 285.6 LBS

## 2023-01-18 DIAGNOSIS — R80.1 PERSISTENT PROTEINURIA: Primary | ICD-10-CM

## 2023-01-18 DIAGNOSIS — E66.01 SEVERE OBESITY (BMI 35.0-39.9) WITH COMORBIDITY (HCC): ICD-10-CM

## 2023-01-18 PROCEDURE — G8536 NO DOC ELDER MAL SCRN: HCPCS | Performed by: INTERNAL MEDICINE

## 2023-01-18 PROCEDURE — 1101F PT FALLS ASSESS-DOCD LE1/YR: CPT | Performed by: INTERNAL MEDICINE

## 2023-01-18 PROCEDURE — G8510 SCR DEP NEG, NO PLAN REQD: HCPCS | Performed by: INTERNAL MEDICINE

## 2023-01-18 PROCEDURE — 1123F ACP DISCUSS/DSCN MKR DOCD: CPT | Performed by: INTERNAL MEDICINE

## 2023-01-18 PROCEDURE — 3017F COLORECTAL CA SCREEN DOC REV: CPT | Performed by: INTERNAL MEDICINE

## 2023-01-18 PROCEDURE — G8427 DOCREV CUR MEDS BY ELIG CLIN: HCPCS | Performed by: INTERNAL MEDICINE

## 2023-01-18 PROCEDURE — G8417 CALC BMI ABV UP PARAM F/U: HCPCS | Performed by: INTERNAL MEDICINE

## 2023-01-18 PROCEDURE — 99213 OFFICE O/P EST LOW 20 MIN: CPT | Performed by: INTERNAL MEDICINE

## 2023-01-18 NOTE — PROGRESS NOTES
Nephrology Consult    Patient: Mani Pritchett MRN: [id number]  SSN: [id number]    YOB: 1955  Age: 78 y.o. Sex: male      Subjective:   Reason for the consultation. Proteinuria  History of present illness. The patient is 56-year-old man with underlying history of diabetes mellitus type 2, hypertension was referred for the evaluation of proteinuria. He has normal kidney function with creatinine 0.98 on 10/18/2022. He is noticed to have minimal bilateral lower extremity swelling and takes Lasix 40 mg daily. No abdominal pain nausea vomiting or loose stools. No voiding issues gross hematuria. No chest complaints. Past Medical History:   Diagnosis Date    Acute pancreatitis 04/10/2018    [facility], no trigger identified, gallbladder \"sludge\"    DM (diabetes mellitus) ([facility] [address] 3/15/2010    Elevated cholesterol with high triglycerides 3/15/2010    History of CVA with residual deficit 10/29/2018    [facility] 10/25/2018: right MCA territory, right anterior choroidal artery, LLE>LUE weakness, no cranial nerve involvement. 2018: Lizbeth Connell MD: stop ASA, add plavix    HTN (hypertension), benign 3/15/2010    Obesity 3/15/2010    Personal history of prostate cancer     Prostate cancer ([facility] [address] 5/3/2011     Past Surgical History:   Procedure Laterality Date    HX UROLOGICAL  2010    Prostate seed implant - Dr. Gustavo Miller & Dr. Castañeda Body at [facility]. History reviewed. No pertinent family history. Social History     Tobacco Use    Smoking status: Former     Types: Cigarettes     Quit date: 1995     Years since quittin.0    Smokeless tobacco: Never   Substance Use Topics    Alcohol use: No      Current Outpatient Medications   Medication Sig Dispense Refill    insulin aspart U-100 (NOVOLOG) 100 unit/mL (3 mL) inpn 14 Units by SubCUTAneous route two (2) times daily (with meals).  5 Pen 11    insulin glargine (Lantus Solostar U-100 Insulin) 100 unit/mL (3 mL) inpn 52 Units by SubCUTAneous route daily. 25 Pen 11    Insulin Needles, Disposable, (Novofine 32) 32 gauge x 1/4\" ndle 1 Device by Does Not Apply route daily. 100 Pen Needle 11    amLODIPine (NORVASC) 5 mg tablet Take 1 Tablet by mouth daily. 90 Tablet 4    atorvastatin (LIPITOR) 80 mg tablet Take 1 Tablet by mouth daily. 90 Tablet 4    aspirin 81 mg chewable tablet Take 162 mg by mouth daily. tadalafiL (CIALIS) 20 mg tablet Take 20 mg by mouth daily as needed for Erectile Dysfunction. furosemide (LASIX) 40 mg tablet Take 1 Tab by mouth daily. 60 Tab 1    losartan (COZAAR) 100 mg tablet Take 1 Tab by mouth daily. 90 Tab 4    ergocalciferol (VITAMIN D2) 50,000 unit capsule Take 1 Cap by mouth two (2) times a week. 40 Cap 1    metFORMIN (GLUCOPHAGE) 1,000 mg tablet TAKE ONE TABLET BY MOUTH TWICE DAILY WITH MEALS 180 Tab 3        Allergies   Allergen Reactions    Byetta [Exenatide] Other (comments)     Pancreatits    Lisinopril Cough       Review of Systems:  A comprehensive review of systems was negative except for that written in the History of Present Illness. Objective:     Vitals:    01/18/23 1057   BP: (!) 142/68   Pulse: 90   Weight: 129.5 kg (285 lb 9.6 oz)        Physical Exam:  General: NAD  Eyes: sclera anicteric  Oral Cavity: No thrush or ulcers  Neck: no JVD  Chest: Fair bilateral air entry  Heart: normal sounds  Abdomen: soft and non tender   : no huerta  Lower Extremities: no edema  Skin: no rash  Neuro: intact  Psychiatric: non-depressed            Assessment:     Hospital Problems  Date Reviewed: 12/2/2022   None    Plan:   1 proteinuria. -Etiology can be diabetic nephropathy.    -He was referred for the evaluation of proteinuria.    -has only 0.6 g/g of of proteinuria.    -He is noticed to have minimal bilateral lower extremity swelling.    -He takes Lasix 40 mg daily. -he takes losartan 100 mg daily.  -His kidney functions are normal with creatinine 0.98 on 10/18/2022. -I will see him back in 12 months. 2.  Hypertension.    -Blood pressure is okay. -He takes losartan 100 mg daily which I will continue. 3.  Edema. Bilateral lower extremity.    -Etiology can be fluid overload/CHF/nephrotic syndrome.    -He takes Lasix 40 mg daily. 4.  Diabetes mellitus type 2. He takes insulin.         Signed By: Annie Mondragon MD     January 18, 2023

## 2023-02-04 DIAGNOSIS — E11.21 TYPE 2 DIABETES WITH NEPHROPATHY (HCC): Primary | ICD-10-CM

## 2023-02-05 DIAGNOSIS — E11.21 TYPE 2 DIABETES WITH NEPHROPATHY (HCC): Primary | ICD-10-CM

## 2023-02-08 ENCOUNTER — TELEPHONE (OUTPATIENT)
Dept: FAMILY MEDICINE CLINIC | Age: 68
End: 2023-02-08

## 2023-02-08 NOTE — TELEPHONE ENCOUNTER
Pt called to speak to [employer] as he states he received a call. Upon realizing that [employer] was on lunch pt disconnected. Attempted to reach out to pt to inform him of situation, no answer LMOV. Please review and advise as needed.

## 2023-02-09 NOTE — TELEPHONE ENCOUNTER
Called patient back he says he was returning a phone call says he had gotten a message on his VM from me asking him to call the office back about his medication. He was told I had spoken with him about the increase on his Lipitor. Patient has expressed understanding and realizes this was an old message. He also says he has seen vascular and they are not planning to do any surgery at this time and he has an appt for CT scan at the [place] [id number] on tomorrow.

## 2023-02-13 DIAGNOSIS — I69.30 HISTORY OF CVA WITH RESIDUAL DEFICIT: ICD-10-CM

## 2023-02-13 DIAGNOSIS — I65.22 STENOSIS OF LEFT CAROTID ARTERY: Primary | ICD-10-CM

## 2023-02-15 ENCOUNTER — TELEPHONE (OUTPATIENT)
Facility: CLINIC | Age: 68
End: 2023-02-15

## 2023-02-15 NOTE — TELEPHONE ENCOUNTER
Pt called to speak to Smith Donald in regards to a referral . Informed pt that Smith Donald was currently unavailable at the moment and could return a call at her soonest convenience. Pt expressed understanding. Please review and advise as needed.

## 2023-02-17 NOTE — TELEPHONE ENCOUNTER
Called patient he says he had a CT scan done at the [place] [address] on 2/14 and was having a hard time getting the results. He asked if there was anyway we could get the results told patient I will call over to the [facility] To see if we can get a copy and will call him back.

## 2023-02-21 NOTE — TELEPHONE ENCOUNTER
2005 A [facility] was transferred to out patient records no answer at all no answering machine, will call back later.

## 2023-04-07 ENCOUNTER — TELEPHONE (OUTPATIENT)
Facility: CLINIC | Age: 68
End: 2023-04-07

## 2023-04-07 NOTE — TELEPHONE ENCOUNTER
Patient called the office back and says he will not be able to get in to see Urology until the  and asked if Dr. Makenzie White can place a referral for him to see if he will be able to get in sooner. Told patient he will definitely need an appt for her to place the referral and offered virtual visit. Patient has been scheduled on 23. Care Transitions Initial Follow Up Call    Outreach made within 2 business days of discharge: Yes    Patient: Marilee Ricci. Patient : 1955   MRN: [id number]  Reason for Admission: There are no discharge diagnoses documented for the most recent discharge. Discharge Date: 19       Spoke with: Patient     Discharge department/facility: [address] [place] [address] Interactive Patient Contact:  Was patient able to fill all prescriptions: Yes  Was patient instructed to bring all medications to the follow-up visit: Yes  Is patient taking all medications as directed in the discharge summary?  Yes  Does patient understand their discharge instructions: Yes  Does patient have questions or concerns that need addressed prior to 7-14 day follow up office visit: no    Scheduled appointment with PCP within 7-14 days    Follow Up  Future Appointments   Date Time Provider Haley Armando   2023  9:30 AM Nicholas Lerma MD [facility] BS AMB   2023  8:30 AM Sandra Marroquin [address] [place] [address]

## 2023-04-07 NOTE — TELEPHONE ENCOUNTER
Called patient for RAFAT follow up. KASEY sharp told patient I was calling him to follow up with him after his procedure at [facility] and to get his follow up scheduled with Dr. Saravanan Hylton. Patient says that he was supposed to be seeing a Urologists to have his catheter removed and Dr. Leoncio Smith office was supposed to get him scheduled and he has not heard anything from them. He says he wants to see Urology first before schedules his appt with Dr. Saravanan Hylton. He was advised to call Dr. Leoncio Smith office to f/u on referral status and call our office back to schedule his f/u with Dr. Saravanan Hylton. Patient has expressed understanding and agrees with this plan.

## 2023-04-11 PROBLEM — R33.9 URINARY RETENTION: Status: ACTIVE | Noted: 2023-04-11

## 2023-04-11 PROBLEM — Z98.890 HISTORY OF LEFT-SIDED CAROTID ENDARTERECTOMY: Status: ACTIVE | Noted: 2023-04-11

## 2023-04-27 ENCOUNTER — CARE COORDINATION (OUTPATIENT)
Facility: CLINIC | Age: 68
End: 2023-04-27

## 2023-05-05 ENCOUNTER — CARE COORDINATION (OUTPATIENT)
Facility: CLINIC | Age: 68
End: 2023-05-05

## 2023-05-05 NOTE — CARE COORDINATION
Ambulatory Care Coordination Note  2023    Patient Current Location:  [place]     ACM contacted the patient by telephone. Verified name and  with patient as identifiers. Provided introduction to self, and explanation of the ACM role. Challenges to be reviewed by the provider   Additional needs identified to be addressed with provider: No  none               Method of communication with provider: none. ACM: Leon Mehta RN    Patient doing well. He has all he needs. He does activities and rests as needed. No care needs noted today.      Offered patient enrollment in the Remote Patient Monitoring (RPM) program for in-home monitoring: NA.      Care Coordination Interventions    Referral from Primary Care Provider: No  Suggested Interventions and Community Resources          Goals Addressed    None         Future Appointments   Date Time Provider Haley Armando   2023 12:00 PM Alexi Gregg MD [facility] BS AMB   2023  2:40 PM [facility] NURSE Nick Steele Sched   2023  9:40 AM Guadalupe Branham PA-C [facility] [place] Sched

## 2023-05-12 ENCOUNTER — CARE COORDINATION (OUTPATIENT)
Facility: CLINIC | Age: 68
End: 2023-05-12

## 2023-05-12 NOTE — CARE COORDINATION
Ambulatory Care Coordination Note  2023    Patient Current Location:  [place]     ACM contacted the patient by telephone. Verified name and  with patient as identifiers. Provided introduction to self, and explanation of the ACM role. Challenges to be reviewed by the provider   Additional needs identified to be addressed with provider: No  none               Method of communication with provider: none. ACM: Isaac Urban RN    Patient stated that he is doing well. No care needs noted. He has all meds and is taking as prescribed. He is eating and drinking well. He is moving about and resting as needed. Offered patient enrollment in the Remote Patient Monitoring (RPM) program for in-home monitoring: NA.        Care Coordination Interventions    Referral from Primary Care Provider: No  Suggested Interventions and Community Resources          Goals Addressed                   This Visit's Progress     Attend follow up appointments on schedule        Encouraged patient to attend all follow up visits.      Next one 2023 with PCP                Future Appointments   Date Time Provider Haley Armando   2023 12:00 PM Rita Oneill MD [facility] BS AMB   2023  2:40 PM [facility] NURSE Rogelio Barnes   2023  9:40 AM Jose Whittington PA-C [facility] Kamari Alexis

## 2023-05-19 ENCOUNTER — CARE COORDINATION (OUTPATIENT)
Facility: CLINIC | Age: 68
End: 2023-05-19

## 2023-05-19 NOTE — CARE COORDINATION
Ambulatory Care Coordination Note  2023    Patient Current Location:  [place]     ACM contacted the patient by telephone. Verified name and  with patient as identifiers. Provided introduction to self, and explanation of the ACM role. Challenges to be reviewed by the provider   Additional needs identified to be addressed with provider: No  none               Method of communication with provider: none. ACM: Neel Richard RN    Patient continues to do well. No care needs noted. He has all meds and is taking as directed. He is eating and drinking well. He is moving about and resting as directed.      Offered patient enrollment in the Remote Patient Monitoring (RPM) program for in-home monitoring: NA.      Care Coordination Interventions    Referral from Primary Care Provider: No  Suggested Interventions and Community Resources          Goals Addressed    None         Future Appointments   Date Time Provider Haley Armando   2023  2:40 PM [facility] NURSE Héctor Barnes   2023  9:40 AM CHERYLE Schmid

## 2023-06-05 ENCOUNTER — CARE COORDINATION (OUTPATIENT)
Facility: CLINIC | Age: 68
End: 2023-06-05

## 2023-06-05 NOTE — CARE COORDINATION
Ambulatory Care Coordination Note  2023    Patient Current Location:  Home: [address] [place] [id number]     ACM contacted the patient by telephone. Verified name and  with patient as identifiers. Provided introduction to self, and explanation of the ACM role. Challenges to be reviewed by the provider   Additional needs identified to be addressed with provider: No  none               Method of communication with provider: phone. ACM: Daniel Ch RN    Patient stated that he continues to do well. He has all meds and is taking as prescribed. He is eating and drinking well. He is moving about and resting as needed. Called PCP office so they could call to get patient a f/u appt as he missed his previously scheduled one due to a conflict. Offered patient enrollment in the Remote Patient Monitoring (RPM) program for in-home monitoring: NA.      Care Coordination Interventions    Referral from Primary Care Provider: No  Suggested Interventions and Community Resources          Goals Addressed                   This Visit's Progress     Attend follow up appointments on schedule        Encouraged patient to attend all follow up visits. Next one 2023 with PCP-patient missed appt due to a conflict. Will be rescheduled soon. Take all medications as ordered        Encouraged patient to take meds as prescribed.                Future Appointments   Date Time Provider Haley Armando   2023  2:40 PM [facility] NURSE Nadine Barnes   2023  9:40 AM Niko Powell PA-C [facility] Anjali Garcia

## 2023-06-19 ENCOUNTER — CARE COORDINATION (OUTPATIENT)
Facility: CLINIC | Age: 68
End: 2023-06-19

## 2023-06-19 NOTE — CARE COORDINATION
6/19/2023         4:13 PM    ACM reviewed patient chart and then attempted to contact patient for routine follow up. He was unavailable at the time of the call. Left a voice mail message for him introducing myself, explaining the reason for the call and providing my contact information. Requested that patient return my call at his earliest convenience. Will follow.

## 2023-07-06 ENCOUNTER — CARE COORDINATION (OUTPATIENT)
Facility: CLINIC | Age: 68
End: 2023-07-06

## 2023-07-06 NOTE — CARE COORDINATION
Ambulatory Care Coordination Note  2023    Patient Current Location:  Home: [address] [place] [address] [address] [address]     ACM contacted the patient by telephone. Verified name and  with patient as identifiers. Provided introduction to self, and explanation of the ACM role. Challenges to be reviewed by the provider   Additional needs identified to be addressed with provider: No  none               Method of communication with provider: none. ACM: Koki Childs RN    Patient is doing well. He has all his meds and is taking as directed. He is eating and drinking. He is moving about as he can. No care needs noted. Offered patient enrollment in the Remote Patient Monitoring (RPM) program for in-home monitoring: NA.      Care Coordination Interventions    Referral from Primary Care Provider: No  Suggested Interventions and Community Resources          Goals Addressed                   This Visit's Progress     Take all medications as ordered        Encouraged patient to take meds as prescribed. Patient has all meds and is taking as directed.                Future Appointments   Date Time Provider [address]   2023  2:40 PM [facility] NURSE Linda Barnes   2023  9:40 AM Fay Lauerano PA-C [facility] Balwinder Nichols

## 2023-07-20 ENCOUNTER — CARE COORDINATION (OUTPATIENT)
Facility: CLINIC | Age: 68
End: 2023-07-20

## 2023-07-20 NOTE — CARE COORDINATION
7/20/2023         3:36 PM    ACM reviewed patient chart and then attempted to call him for routine follow up. He was unavailable at the time of the call. Left a voice mail message for him introducing myself, explaining the reason for my call and providing my contact information. Requested that patient return my call at his earliest convenience. Will follow.

## 2023-08-03 ENCOUNTER — CARE COORDINATION (OUTPATIENT)
Facility: CLINIC | Age: 68
End: 2023-08-03

## 2023-08-03 NOTE — CARE COORDINATION
ACM closing episode at this time. ACP has been reviewed and information sent to patient as needed. Use of MyChart has been discussed with patient/family understanding its use. Med rec has been completed and up to date at time of closing episode. Importance of taking all medications as prescribed and on time, maintaining an adequate supply of medication, and how to obtain refills. Goals are updated and met to the best of patient's ability. Importance of keeping all scheduled appointments and how to make those appointments discussed with patient/family understanding. Review of symptoms and disease process discussed as needed, with patient/family showing understanding.   No further ACM contacts scheduled  Patient/family has ACM contact information for any further questions, concerns or needs

## 2023-08-11 ENCOUNTER — OFFICE VISIT (OUTPATIENT)
Facility: CLINIC | Age: 68
End: 2023-08-11
Payer: MEDICARE

## 2023-08-11 VITALS
WEIGHT: 271 LBS | OXYGEN SATURATION: 97 % | HEIGHT: 75 IN | HEART RATE: 78 BPM | SYSTOLIC BLOOD PRESSURE: 134 MMHG | DIASTOLIC BLOOD PRESSURE: 60 MMHG | TEMPERATURE: 98.3 F | BODY MASS INDEX: 33.69 KG/M2

## 2023-08-11 DIAGNOSIS — E11.21 TYPE 2 DIABETES WITH NEPHROPATHY (HCC): ICD-10-CM

## 2023-08-11 DIAGNOSIS — Z79.4 TYPE 2 DIABETES MELLITUS WITH BOTH EYES AFFECTED BY PROLIFERATIVE RETINOPATHY AND MACULAR EDEMA, WITH LONG-TERM CURRENT USE OF INSULIN (HCC): Primary | ICD-10-CM

## 2023-08-11 DIAGNOSIS — E11.311 DIABETIC MACULAR EDEMA (HCC): ICD-10-CM

## 2023-08-11 DIAGNOSIS — E11.3513 TYPE 2 DIABETES MELLITUS WITH BOTH EYES AFFECTED BY PROLIFERATIVE RETINOPATHY AND MACULAR EDEMA, WITH LONG-TERM CURRENT USE OF INSULIN (HCC): Primary | ICD-10-CM

## 2023-08-11 DIAGNOSIS — Z87.19 HISTORY OF PANCREATITIS: ICD-10-CM

## 2023-08-11 DIAGNOSIS — I10 ESSENTIAL HYPERTENSION: ICD-10-CM

## 2023-08-11 PROCEDURE — G8417 CALC BMI ABV UP PARAM F/U: HCPCS | Performed by: FAMILY MEDICINE

## 2023-08-11 PROCEDURE — 3017F COLORECTAL CA SCREEN DOC REV: CPT | Performed by: FAMILY MEDICINE

## 2023-08-11 PROCEDURE — 1036F TOBACCO NON-USER: CPT | Performed by: FAMILY MEDICINE

## 2023-08-11 PROCEDURE — 2022F DILAT RTA XM EVC RTNOPTHY: CPT | Performed by: FAMILY MEDICINE

## 2023-08-11 PROCEDURE — 1123F ACP DISCUSS/DSCN MKR DOCD: CPT | Performed by: FAMILY MEDICINE

## 2023-08-11 PROCEDURE — 3078F DIAST BP <80 MM HG: CPT | Performed by: FAMILY MEDICINE

## 2023-08-11 PROCEDURE — G8427 DOCREV CUR MEDS BY ELIG CLIN: HCPCS | Performed by: FAMILY MEDICINE

## 2023-08-11 PROCEDURE — 3075F SYST BP GE 130 - 139MM HG: CPT | Performed by: FAMILY MEDICINE

## 2023-08-11 PROCEDURE — 99214 OFFICE O/P EST MOD 30 MIN: CPT | Performed by: FAMILY MEDICINE

## 2023-08-11 PROCEDURE — 3046F HEMOGLOBIN A1C LEVEL >9.0%: CPT | Performed by: FAMILY MEDICINE

## 2023-08-11 ASSESSMENT — PATIENT HEALTH QUESTIONNAIRE - PHQ9
4. FEELING TIRED OR HAVING LITTLE ENERGY: 0
SUM OF ALL RESPONSES TO PHQ9 QUESTIONS 1 & 2: 0
9. THOUGHTS THAT YOU WOULD BE BETTER OFF DEAD, OR OF HURTING YOURSELF: 0
6. FEELING BAD ABOUT YOURSELF - OR THAT YOU ARE A FAILURE OR HAVE LET YOURSELF OR YOUR FAMILY DOWN: 0
1. LITTLE INTEREST OR PLEASURE IN DOING THINGS: 0
2. FEELING DOWN, DEPRESSED OR HOPELESS: 0
3. TROUBLE FALLING OR STAYING ASLEEP: 0
5. POOR APPETITE OR OVEREATING: 0
SUM OF ALL RESPONSES TO PHQ QUESTIONS 1-9: 0
SUM OF ALL RESPONSES TO PHQ QUESTIONS 1-9: 0
7. TROUBLE CONCENTRATING ON THINGS, SUCH AS READING THE NEWSPAPER OR WATCHING TELEVISION: 0
SUM OF ALL RESPONSES TO PHQ QUESTIONS 1-9: 0
8. MOVING OR SPEAKING SO SLOWLY THAT OTHER PEOPLE COULD HAVE NOTICED. OR THE OPPOSITE, BEING SO FIGETY OR RESTLESS THAT YOU HAVE BEEN MOVING AROUND A LOT MORE THAN USUAL: 0
SUM OF ALL RESPONSES TO PHQ QUESTIONS 1-9: 0

## 2023-08-11 NOTE — PROGRESS NOTES
Alva Weiss. (: 1955) is a 78 y.o. male, established patient, here for:    ASSESSMENT/PLAN:  1. 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 Acceptable but with high insulin burden driving obesity. Caution trial semaglutide given hx pancreatitis . Decrease metform 500 mg bid. Continue basal insulin 52 units and mealtime 14 units for now. Lost to ophtho follow up. Referring  Orders:  -     Semaglutide,0.25 or 0.5MG/DOS, 2 MG/1.5ML SOPN; Inject 0.25 mg into the skin once a week, Disp-3 Adjustable Dose Pre-filled Pen Syringe, R-1Normal  -     metFORMIN (GLUCOPHAGE) 500 MG tablet; Take 1 tablet by mouth 2 times daily (with meals), Disp-180 tablet, R-3Normal  -     External Referral To Ophthalmology  2. Type 2 diabetes with nephropathy (HCC)  -     Semaglutide,0.25 or 0.5MG/DOS, 2 MG/1.5ML SOPN; Inject 0.25 mg into the skin once a week, Disp-3 Adjustable Dose Pre-filled Pen Syringe, R-1Normal  -     metFORMIN (GLUCOPHAGE) 500 MG tablet; Take 1 tablet by mouth 2 times daily (with meals), Disp-180 tablet, R-3Normal  3. History of pancreatitis  4. Diabetic macular edema [facility])  -     External Referral To Ophthalmology  5. Essential hypertension  Assessment & Plan:  Uncontrolled off meds. Reassess next month on meds. Thinks had prevnar through [facility]. Checking. Follow-up and Dispositions    Return in about 1 month (around 2023) for chronic medical conditions, labs 1 week prior, (30). SUBJECTIVE/OBJECTIVE:  HPI  No labs    follow up DM2/obesity with nephropathy, retinopathy and macular edema - \"I'm tired of metformin. It hasn't done anything. \"   Insulin   52 units long acting  14 units short acting with meals    follow up CKD with excessive proteinuria - nephropathy   follow up hypertension context history of CVA with residual left sided

## 2023-08-11 NOTE — ASSESSMENT & PLAN NOTE
Most recent A1c was 6.8 in 10/2022. Acceptable but with high insulin burden driving obesity. Caution trial semaglutide given hx pancreatitis 2012. Decrease metform 500 mg bid. Continue basal insulin 52 units and mealtime 14 units for now. Lost to ophtho follow up.  Referring

## 2023-08-16 ENCOUNTER — TELEPHONE (OUTPATIENT)
Facility: CLINIC | Age: 68
End: 2023-08-16

## 2023-08-16 NOTE — TELEPHONE ENCOUNTER
Called patient to ask if his referrals have to go through [facility] for approval no answer left message asking that he call the office back.

## 2023-08-21 ENCOUNTER — TELEPHONE (OUTPATIENT)
Facility: CLINIC | Age: 68
End: 2023-08-21

## 2023-08-21 NOTE — TELEPHONE ENCOUNTER
Pharmacy called in regards to needing more information for a script they received. They would like most recent progress notes and \"criteria for use\". Please review and advise as soon as possible.

## 2023-08-24 NOTE — TELEPHONE ENCOUNTER
[facility] pharmacy spoke with pharmacists who states patient needs a PA for the [address] [place] [address]. He states there is a form that needs to be completed and has told me where to find this on their web site and will also need OV notes and last DM eye exam. Form has been printed called [facility] and requested last DM eye exam to be faxed. This was received in the office, criteria form has been completed and has been faxed with OV note and DM eye note.

## 2023-08-30 NOTE — TELEPHONE ENCOUNTER
Referral request was faxed 8/24/23. Called patient to see if he has been contacted he says he has an appt on 9/3? to see the eye doctor he did ask about the Ozempic and was made aware leyda needed documentation of an updated DM eye exam to send with the request so as soon as we get the consult report back from the eye doctor we can send that request in. Told patient I will let him know once we have sent the request for his Ozempic.

## 2023-09-06 ENCOUNTER — PATIENT MESSAGE (OUTPATIENT)
Dept: OTHER | Facility: CLINIC | Age: 68
End: 2023-09-06

## 2023-09-13 NOTE — TELEPHONE ENCOUNTER
Updated DM eye report has been received and will be submitted with PA request for patients Ozempic once this has been reviewed and signed by Dr. Edward Austin.

## 2023-09-13 NOTE — TELEPHONE ENCOUNTER
Called patient verified he did have his eye exam with [facility] on 9/3. Told patient I will call that office to request his report so we can submit this with his PA request for Ozempic. Eye exam has been requested.

## 2023-09-18 NOTE — TELEPHONE ENCOUNTER
DM eye exam was received on 9/13 and has been placed in Dr. Angie Duarte for review and will faxed along with PA request once it has been signed.

## 2023-09-20 NOTE — TELEPHONE ENCOUNTER
DM eye exam has been received and reviewed by Dr. Johnnie Barrientos. Report has been faxed to [facility] with PA request as well as Dr. Gilman Nearing note. Called patient to let him know, no answer left message on his VM making him aware PA request has been faxed and I will notify him once I hear back from the pharmacy.

## 2023-09-20 NOTE — TELEPHONE ENCOUNTER
Patient called the office back and has been made aware of my message he says he received notice from the [place] stating the [facility] was denied told patient I had not received any notification on that and have faxed over the requested information and will f/u with the pharmacy if I do not hear from them by next week.

## 2023-09-25 NOTE — TELEPHONE ENCOUNTER
[address] pharmacy told the PA request was denied stating all alternative treatment options had not been exhausted. Requested this response be faxed to [phone].

## 2023-10-10 ENCOUNTER — TELEMEDICINE (OUTPATIENT)
Facility: CLINIC | Age: 68
End: 2023-10-10
Payer: MEDICARE

## 2023-10-10 DIAGNOSIS — Z79.4 TYPE 2 DIABETES MELLITUS WITH BOTH EYES AFFECTED BY PROLIFERATIVE RETINOPATHY AND MACULAR EDEMA, WITH LONG-TERM CURRENT USE OF INSULIN (HCC): Primary | ICD-10-CM

## 2023-10-10 DIAGNOSIS — E11.21 DIABETIC NEPHROPATHY ASSOCIATED WITH TYPE 2 DIABETES MELLITUS (HCC): ICD-10-CM

## 2023-10-10 DIAGNOSIS — E11.3513 TYPE 2 DIABETES MELLITUS WITH BOTH EYES AFFECTED BY PROLIFERATIVE RETINOPATHY AND MACULAR EDEMA, WITH LONG-TERM CURRENT USE OF INSULIN (HCC): Primary | ICD-10-CM

## 2023-10-10 DIAGNOSIS — E11.21 TYPE 2 DIABETES WITH NEPHROPATHY (HCC): ICD-10-CM

## 2023-10-10 DIAGNOSIS — E11.311 DIABETIC MACULAR EDEMA (HCC): ICD-10-CM

## 2023-10-10 PROCEDURE — 2022F DILAT RTA XM EVC RTNOPTHY: CPT | Performed by: FAMILY MEDICINE

## 2023-10-10 PROCEDURE — 3017F COLORECTAL CA SCREEN DOC REV: CPT | Performed by: FAMILY MEDICINE

## 2023-10-10 PROCEDURE — 1123F ACP DISCUSS/DSCN MKR DOCD: CPT | Performed by: FAMILY MEDICINE

## 2023-10-10 PROCEDURE — G8427 DOCREV CUR MEDS BY ELIG CLIN: HCPCS | Performed by: FAMILY MEDICINE

## 2023-10-10 PROCEDURE — 99214 OFFICE O/P EST MOD 30 MIN: CPT | Performed by: FAMILY MEDICINE

## 2023-10-10 PROCEDURE — 3046F HEMOGLOBIN A1C LEVEL >9.0%: CPT | Performed by: FAMILY MEDICINE

## 2023-10-10 NOTE — PROGRESS NOTES
Amber Mcghee is a 78 y.o. male, established patient, who was seen by synchronous (real-time) audio-video technology on 10/10/2023 for     Assessment & Plan:   1. 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 Acceptable but with high insulin burden driving obesity. GLP 1 not covered. Empagliflozin retrial 10 mg daily. Continue metform 500 mg bid. Continue basal insulin 52 units and mealtime 14 units for now. Patient Instructions   If sugars decrease to less than 100, decrease long acting insulin to 49 units per day and mealtime insulin to 11 units      Orders:  -     empagliflozin (JARDIANCE) 10 MG tablet; Take 1 tablet by mouth daily, Disp-90 tablet, R-1Normal  -     Comprehensive Metabolic Panel; Future  -     Microalbumin / Creatinine Urine Ratio; Future  -     Lipid Panel; Future  -     Hemoglobin A1C; Future  2. Diabetic nephropathy associated with type 2 diabetes mellitus (HCC)  -     empagliflozin (JARDIANCE) 10 MG tablet; Take 1 tablet by mouth daily, Disp-90 tablet, R-1Normal  -     Comprehensive Metabolic Panel; Future  -     Microalbumin / Creatinine Urine Ratio; Future  -     Lipid Panel; Future  -     Hemoglobin A1C; Future  3. Type 2 diabetes with nephropathy (HCC)  -     empagliflozin (JARDIANCE) 10 MG tablet; Take 1 tablet by mouth daily, Disp-90 tablet, R-1Normal  -     Comprehensive Metabolic Panel; Future  -     Microalbumin / Creatinine Urine Ratio; Future  -     Lipid Panel; Future  -     Hemoglobin A1C; Future  4. Diabetic macular edema [facility])  Assessment & Plan:  Reengaged with ophtho         COVID/flu vaccines to be done at [facility] and Dispositions    Return in about 6 weeks (around 11/21/2023) for diabetes follow up, [address] same day, labs 1 week prior (45).             Subjective:   DM2 -     Blood sugars well controlled  [place] requiring

## 2023-10-10 NOTE — ASSESSMENT & PLAN NOTE
Most recent A1c was 6.8 in 10/2022. Acceptable but with high insulin burden driving obesity. GLP 1 not covered. Empagliflozin retrial 10 mg daily. Continue metform 500 mg bid. Continue basal insulin 52 units and mealtime 14 units for now.    Patient Instructions   If sugars decrease to less than 100, decrease long acting insulin to 49 units per day and mealtime insulin to 11 units

## 2023-10-10 NOTE — PATIENT INSTRUCTIONS
If sugars decrease to less than 100, decrease long acting insulin to 49 units per day and mealtime insulin to 11 units

## 2023-10-27 PROBLEM — E11.3293 MILD NONPROLIFERATIVE DIABETIC RETINOPATHY OF BOTH EYES WITHOUT MACULAR EDEMA ASSOCIATED WITH TYPE 2 DIABETES MELLITUS (HCC): Status: ACTIVE | Noted: 2018-05-25

## 2023-11-15 ENCOUNTER — NURSE ONLY (OUTPATIENT)
Facility: CLINIC | Age: 68
End: 2023-11-15
Payer: MEDICARE

## 2023-11-15 ENCOUNTER — HOSPITAL ENCOUNTER (OUTPATIENT)
Facility: HOSPITAL | Age: 68
Setting detail: SPECIMEN
Discharge: HOME | End: 2023-11-18
Payer: MEDICARE

## 2023-11-15 DIAGNOSIS — N18.2 CKD (CHRONIC KIDNEY DISEASE) STAGE 2, GFR 60-89 ML/MIN: ICD-10-CM

## 2023-11-15 DIAGNOSIS — Z79.4 TYPE 2 DIABETES MELLITUS WITH BOTH EYES AFFECTED BY PROLIFERATIVE RETINOPATHY AND MACULAR EDEMA, WITH LONG-TERM CURRENT USE OF INSULIN (HCC): ICD-10-CM

## 2023-11-15 DIAGNOSIS — E83.42 HYPOMAGNESEMIA: ICD-10-CM

## 2023-11-15 DIAGNOSIS — E11.3513 TYPE 2 DIABETES MELLITUS WITH BOTH EYES AFFECTED BY PROLIFERATIVE RETINOPATHY AND MACULAR EDEMA, WITH LONG-TERM CURRENT USE OF INSULIN (HCC): ICD-10-CM

## 2023-11-15 DIAGNOSIS — I10 ESSENTIAL HYPERTENSION: Primary | ICD-10-CM

## 2023-11-15 DIAGNOSIS — E78.5 HYPERLIPIDEMIA, UNSPECIFIED HYPERLIPIDEMIA TYPE: ICD-10-CM

## 2023-11-15 LAB
ALBUMIN SERPL-MCNC: 3.5 G/DL (ref 3.4–5)
ALBUMIN/GLOB SERPL: 1.1 (ref 0.8–1.7)
ALP SERPL-CCNC: 63 U/L (ref 45–117)
ALT SERPL-CCNC: 31 U/L (ref 16–61)
ANION GAP SERPL CALC-SCNC: 5 MMOL/L (ref 3–18)
AST SERPL-CCNC: 17 U/L (ref 10–38)
BILIRUB SERPL-MCNC: 0.2 MG/DL (ref 0.2–1)
BUN SERPL-MCNC: 14 MG/DL (ref 7–18)
BUN/CREAT SERPL: 11 (ref 12–20)
CALCIUM SERPL-MCNC: 9.2 MG/DL (ref 8.5–10.1)
CHLORIDE SERPL-SCNC: 105 MMOL/L (ref 100–111)
CHOLEST SERPL-MCNC: 185 MG/DL
CO2 SERPL-SCNC: 29 MMOL/L (ref 21–32)
CREAT SERPL-MCNC: 1.23 MG/DL (ref 0.6–1.3)
CREAT UR-MCNC: 119 MG/DL (ref 30–125)
EST. AVERAGE GLUCOSE BLD GHB EST-MCNC: 134 MG/DL
GLOBULIN SER CALC-MCNC: 3.3 G/DL (ref 2–4)
GLUCOSE SERPL-MCNC: 329 MG/DL (ref 74–99)
HBA1C MFR BLD: 6.3 % (ref 4.2–5.6)
HDLC SERPL-MCNC: 31 MG/DL (ref 40–60)
HDLC SERPL: 6 (ref 0–5)
LDLC SERPL CALC-MCNC: 99.4 MG/DL (ref 0–100)
LIPID PANEL: ABNORMAL
MICROALBUMIN UR-MCNC: 40.5 MG/DL (ref 0–3)
MICROALBUMIN/CREAT UR-RTO: 340 MG/G (ref 0–30)
POTASSIUM SERPL-SCNC: 4.3 MMOL/L (ref 3.5–5.5)
PROT SERPL-MCNC: 6.8 G/DL (ref 6.4–8.2)
SODIUM SERPL-SCNC: 139 MMOL/L (ref 136–145)
TRIGL SERPL-MCNC: 273 MG/DL
VLDLC SERPL CALC-MCNC: 54.6 MG/DL

## 2023-11-15 PROCEDURE — 82043 UR ALBUMIN QUANTITATIVE: CPT

## 2023-11-15 PROCEDURE — 82570 ASSAY OF URINE CREATININE: CPT

## 2023-11-15 PROCEDURE — 83036 HEMOGLOBIN GLYCOSYLATED A1C: CPT

## 2023-11-15 PROCEDURE — 36415 COLL VENOUS BLD VENIPUNCTURE: CPT | Performed by: FAMILY MEDICINE

## 2023-11-15 PROCEDURE — 80061 LIPID PANEL: CPT

## 2023-11-15 PROCEDURE — 80053 COMPREHEN METABOLIC PANEL: CPT

## 2023-11-15 PROCEDURE — 36415 COLL VENOUS BLD VENIPUNCTURE: CPT

## 2023-11-20 ENCOUNTER — OFFICE VISIT (OUTPATIENT)
Facility: CLINIC | Age: 68
End: 2023-11-20
Payer: MEDICARE

## 2023-11-20 VITALS
OXYGEN SATURATION: 98 % | SYSTOLIC BLOOD PRESSURE: 130 MMHG | TEMPERATURE: 98.6 F | BODY MASS INDEX: 34.17 KG/M2 | WEIGHT: 274.8 LBS | DIASTOLIC BLOOD PRESSURE: 68 MMHG | HEART RATE: 89 BPM | HEIGHT: 75 IN

## 2023-11-20 DIAGNOSIS — Z71.89 ACP (ADVANCE CARE PLANNING): ICD-10-CM

## 2023-11-20 DIAGNOSIS — Z00.00 MEDICARE ANNUAL WELLNESS VISIT, SUBSEQUENT: Primary | ICD-10-CM

## 2023-11-20 DIAGNOSIS — Z13.6 SCREENING FOR AAA (ABDOMINAL AORTIC ANEURYSM): ICD-10-CM

## 2023-11-20 PROBLEM — Z79.4 TYPE 2 DIABETES MELLITUS WITH DIABETIC NEUROPATHY, WITH LONG-TERM CURRENT USE OF INSULIN (HCC): Status: ACTIVE | Noted: 2023-11-20

## 2023-11-20 PROBLEM — E11.40 TYPE 2 DIABETES MELLITUS WITH DIABETIC NEUROPATHY, WITH LONG-TERM CURRENT USE OF INSULIN (HCC): Status: ACTIVE | Noted: 2023-11-20

## 2023-11-20 PROBLEM — B35.1 ONYCHOMYCOSIS: Status: ACTIVE | Noted: 2023-11-20

## 2023-11-20 PROCEDURE — G0439 PPPS, SUBSEQ VISIT: HCPCS | Performed by: FAMILY MEDICINE

## 2023-11-20 ASSESSMENT — LIFESTYLE VARIABLES
HOW MANY STANDARD DRINKS CONTAINING ALCOHOL DO YOU HAVE ON A TYPICAL DAY: PATIENT DOES NOT DRINK
HOW OFTEN DO YOU HAVE A DRINK CONTAINING ALCOHOL: NEVER

## 2023-11-20 ASSESSMENT — PATIENT HEALTH QUESTIONNAIRE - PHQ9
SUM OF ALL RESPONSES TO PHQ QUESTIONS 1-9: 0
SUM OF ALL RESPONSES TO PHQ QUESTIONS 1-9: 0
1. LITTLE INTEREST OR PLEASURE IN DOING THINGS: 0
SUM OF ALL RESPONSES TO PHQ QUESTIONS 1-9: 0
SUM OF ALL RESPONSES TO PHQ QUESTIONS 1-9: 0
1. LITTLE INTEREST OR PLEASURE IN DOING THINGS: 0
SUM OF ALL RESPONSES TO PHQ QUESTIONS 1-9: 0
SUM OF ALL RESPONSES TO PHQ9 QUESTIONS 1 & 2: 0
2. FEELING DOWN, DEPRESSED OR HOPELESS: 0
SUM OF ALL RESPONSES TO PHQ QUESTIONS 1-9: 0

## 2023-11-20 NOTE — ACP (ADVANCE CARE PLANNING)
Advance Care Planning     General Advance Care Planning (ACP) Conversation    Date of Conversation: 11/20/2023  Conducted with: Patient with Decision Making Capacity    Healthcare Decision Maker:    Primary Decision Maker: Arjun Arroyo - Child - [phone]    Secondary Decision Maker: Evgeny Greenfield - Next of Kin - [phone]  Click here to complete Healthcare Decision Makers including selection of the Healthcare Decision Maker Relationship (ie \"Primary\").     He would prefer for his sister to be his primary HCMD  Content/Action Overview:  Referred to ACP    Length of Voluntary ACP Conversation in minutes:  <16 minutes (Non-Billable)    Robin Baig MD

## 2023-11-20 NOTE — PATIENT INSTRUCTIONS
Activity: Your Everyday Guide\" from [employer]. Call 8-421.715.2705 or search [employer] Data on Aging online. You need 9633-6344 mg of calcium and 4465-3815 IU of vitamin D per day. It is possible to meet your calcium requirement with diet alone, but a vitamin D supplement is usually necessary to meet this goal.  When exposed to the sun, use a sunscreen that protects against both UVA and UVB radiation with an SPF of 30 or greater. Reapply every 2 to 3 hours or after sweating, drying off with a towel, or swimming. Always wear a seat belt when traveling in a car. Always wear a helmet when riding a bicycle or motorcycle.

## 2023-11-21 ENCOUNTER — CLINICAL DOCUMENTATION (OUTPATIENT)
Dept: SPIRITUAL SERVICES | Age: 68
End: 2023-11-21

## 2023-11-21 NOTE — ACP (ADVANCE CARE PLANNING)
Advance Care Planning   Ambulatory ACP Specialist Patient Outreach    Date:  11/21/2023    ACP Specialist:  Magdi Baxter    Outreach call to patient in follow-up to ACP Specialist referral from:Khadijah Norman MD    [x] PCP  [] Provider   [] Ambulatory Care Management [] Other     For:                  [] Advance Directive Assistance              [] Complete Portable DNR order              [] Complete POST/POLST/MOST              [] Code Status Discussion             [] Discuss Goals of Care             [x] Early ACP Decision-Making              [] Other (Specify)    Date Referral Received: 11/20/2023    Next Step:   [x] ACP scheduled conversation  [] Outreach again in one week               [x] Email / Mail ACP Info Sheets  [x] Email / Mail Advance Directive   [] Closing referral.  Routing closure to referring provider/staff and to ACP Specialist . [] Closure letter mailed to patient with invitation to contact ACP Specialist if / when ready. [] Other (Specify here):         [x] At this time, Healthcare Decision Maker Is:    Advance Care Planning   Healthcare Decision Maker:    Primary Decision Maker: Gabriel Gina - Child - [phone]    Secondary Decision Maker: Raphael Pierre - Next of Kin - [phone]      [] Primary agent named in scanned advance directive. [x] Legal Next of Kin. [] Unable to determine legal decision maker at this time. Outreaches:       [x] 1st -  Date:  11/21/2023               Intervention:  [x] Spoke with Patient   [] Left Voice mail [] Email / Mail    [] QuanTemplate  [] Other [id number]) : Outcomes:  Patient is agreeable to a conversation with ACP Specialist Leon Smith on Wednesday, 12/6/2023 at 9:30 AM.     A copy of [facility] AMD and ACP information sheets sent to patient's confirmed email address on file with ACP Specialist and Coordinator's contact information included.            Thank you for this referral.

## 2023-11-27 ENCOUNTER — TELEPHONE (OUTPATIENT)
Facility: CLINIC | Age: 68
End: 2023-11-27

## 2023-11-27 DIAGNOSIS — E11.3513 TYPE 2 DIABETES MELLITUS WITH BOTH EYES AFFECTED BY PROLIFERATIVE RETINOPATHY AND MACULAR EDEMA, WITH LONG-TERM CURRENT USE OF INSULIN (HCC): ICD-10-CM

## 2023-11-27 DIAGNOSIS — Z79.4 TYPE 2 DIABETES MELLITUS WITH BOTH EYES AFFECTED BY PROLIFERATIVE RETINOPATHY AND MACULAR EDEMA, WITH LONG-TERM CURRENT USE OF INSULIN (HCC): ICD-10-CM

## 2023-11-27 DIAGNOSIS — E11.21 TYPE 2 DIABETES WITH NEPHROPATHY (HCC): ICD-10-CM

## 2023-11-27 DIAGNOSIS — E11.21 DIABETIC NEPHROPATHY ASSOCIATED WITH TYPE 2 DIABETES MELLITUS (HCC): ICD-10-CM

## 2023-11-27 NOTE — TELEPHONE ENCOUNTER
Pharmacy called to request an updated script for the medication Jardiance. Pharmacy states it is \"non-formulary' and suggested script be sent over as listed below. Please review and advise as needed.             *Jardiance 25 mg (instead of 10) half a tablet daily (instead of 1 tablet daily)*

## 2023-11-30 ENCOUNTER — CLINICAL DOCUMENTATION (OUTPATIENT)
Dept: SPIRITUAL SERVICES | Age: 68
End: 2023-11-30

## 2023-11-30 NOTE — ACP (ADVANCE CARE PLANNING)
Advance Care Planning   Ambulatory ACP Specialist Patient Outreach    Date:  11/30/2023    ACP Specialist:  Rachna Sinclair LCSW    Outreach call to patient in follow-up to ACP Specialist referral from:Jacob Norman MD    [x] PCP  [] Provider   [] Ambulatory Care Management [] Other     For:                  [] Advance Directive Assistance              [] Complete Portable DNR order              [] Complete POST/POLST/MOST              [] Code Status Discussion             [] Discuss Goals of Care             [x] Early ACP Decision-Making              [] Other (Specify)    Date Referral Received:11/20/2023    Next Step:   [x] ACP scheduled conversation  [] Outreach again in one week               [] Email / Mail [address]  [] Email / Mail Advance Directive   [] Closing referral.  Routing closure to referring provider/staff and to ACP Specialist . [] Closure letter mailed to patient with invitation to contact ACP Specialist if / when ready. [] Other (Specify here):         [] At this time, Healthcare Decision Maker Is:        [] Primary agent named in scanned advance directive. [x] Legal Next of Kin. Primary Decision Maker: Marv Connors - Child - [phone]    Secondary Decision Maker: Alexandermicrystal Muir - Next of Kin - [phone]        [] Unable to determine legal decision maker at this time. Outreaches:       [] 1st -  Date:                 Intervention:  [] Spoke with Patient   [] Left Voice mail [] Email / Mail    [] [employer]Pr[employer]art  [] Other [facility]) : Outcomes:           [] 2nd -  Date:                 Intervention:  [] Spoke with Patient  [] Left Voice mail [] Email / Mail    [] iPr[employer]art  [] Other [facility]) : Outcomes:                [] 3rd -  Date:                Intervention:  [] Spoke with Patient   [] Left Voice mail [] Email / Mail    [] MyChart  [] Other [facility]) :        Outcomes:           [x]  Additional Outreach -  Date:  11/30/2023

## 2023-12-06 ENCOUNTER — CLINICAL DOCUMENTATION (OUTPATIENT)
Dept: SPIRITUAL SERVICES | Age: 68
End: 2023-12-06

## 2023-12-06 NOTE — ACP (ADVANCE CARE PLANNING)
Advance Care Planning     Advance Care Planning Clinical Specialist  Conversation Note      Date of ACP Conversation: 12/6/2023    Conversation Conducted with: Patient with Decision Making Capacity    ACP Clinical Specialist: Shalini Barrett, [facility] Decision Maker:     Current Designated Healthcare Decision Maker:     Primary Decision Maker: Mary Jordan - Next of [facility] - [phone]        Care Preferences    Hospitalization: \"If your health worsens and it becomes clear that your chance of recovery is unlikely, what would your preference be regarding hospitalization? \"    Choice:  [x] The patient wants hospitalization. [] The patient prefers comfort-focused treatment without hospitalization. Ventilation: \"If you were in your present state of health and suddenly became very ill and were unable to breathe on your own, what would your preference be about the use of a ventilator (breathing machine) if it were available to you? \"      If the patient would desire the use of ventilator (breathing machine), answer \"yes\". If not, \"no\": yes    \"If your health worsens and it becomes clear that your chance of recovery is unlikely, what would your preference be about the use of a ventilator (breathing machine) if it were available to you? \"     Would the patient desire the use of ventilator (breathing machine)?: Yes      Resuscitation  \"CPR works best to restart the heart when there is a sudden event, like a heart attack, in someone who is otherwise healthy. Unfortunately, CPR does not typically restart the heart for people who have serious health conditions or who are very sick. \"    \"In the event your heart stopped as a result of an underlying serious health condition, would you want attempts to be made to restart your heart (answer \"yes\" for attempt to resuscitate) or would you prefer a natural death (answer \"no\" for do not attempt to resuscitate)? \" yes       [x] Yes   [] No   Educated Patient /

## 2024-01-16 ENCOUNTER — OFFICE VISIT (OUTPATIENT)
Facility: CLINIC | Age: 69
End: 2024-01-16

## 2024-01-16 VITALS
OXYGEN SATURATION: 99 % | BODY MASS INDEX: 34.57 KG/M2 | HEART RATE: 96 BPM | HEIGHT: 75 IN | WEIGHT: 278 LBS | DIASTOLIC BLOOD PRESSURE: 64 MMHG | TEMPERATURE: 97.5 F | SYSTOLIC BLOOD PRESSURE: 142 MMHG

## 2024-01-16 DIAGNOSIS — H26.9 CATARACT OF RIGHT EYE, UNSPECIFIED CATARACT TYPE: ICD-10-CM

## 2024-01-16 DIAGNOSIS — E11.3513 TYPE 2 DIABETES MELLITUS WITH BOTH EYES AFFECTED BY PROLIFERATIVE RETINOPATHY AND MACULAR EDEMA, WITH LONG-TERM CURRENT USE OF INSULIN (HCC): ICD-10-CM

## 2024-01-16 DIAGNOSIS — I69.30 HISTORY OF CVA WITH RESIDUAL DEFICIT: ICD-10-CM

## 2024-01-16 DIAGNOSIS — E11.3213 BOTH EYES AFFECTED BY MILD NONPROLIFERATIVE DIABETIC RETINOPATHY WITH MACULAR EDEMA, ASSOCIATED WITH TYPE 2 DIABETES MELLITUS (HCC): ICD-10-CM

## 2024-01-16 DIAGNOSIS — Z79.4 TYPE 2 DIABETES MELLITUS WITH BOTH EYES AFFECTED BY PROLIFERATIVE RETINOPATHY AND MACULAR EDEMA, WITH LONG-TERM CURRENT USE OF INSULIN (HCC): ICD-10-CM

## 2024-01-16 DIAGNOSIS — G81.94 LEFT HEMIPARESIS (HCC): ICD-10-CM

## 2024-01-16 DIAGNOSIS — I10 ESSENTIAL HYPERTENSION: ICD-10-CM

## 2024-01-16 DIAGNOSIS — Z01.818 PRE-OP EVALUATION: Primary | ICD-10-CM

## 2024-01-16 ASSESSMENT — PATIENT HEALTH QUESTIONNAIRE - PHQ9
SUM OF ALL RESPONSES TO PHQ9 QUESTIONS 1 & 2: 0
SUM OF ALL RESPONSES TO PHQ QUESTIONS 1-9: 0
2. FEELING DOWN, DEPRESSED OR HOPELESS: 0
1. LITTLE INTEREST OR PLEASURE IN DOING THINGS: 0
SUM OF ALL RESPONSES TO PHQ QUESTIONS 1-9: 0

## 2024-01-16 NOTE — PROGRESS NOTES
Chief Complaint   Patient presents with    Pre-op Exam       \"Have you been to the ER, urgent care clinic since your last visit?  Hospitalized since your last visit?\"    NO    “Have you seen or consulted any other health care providers outside of [facility] since your last visit?”    NO           
Laterality Date    CAROTID ENDARTERECTOMY Left 2023    Santiago Diaz MD    PROSTATE SURGERY  2010    0.03    UROLOGICAL SURGERY  2010    Prostate seed implant - Dr. Calloway & Dr. aWters at [facility].     History reviewed. No pertinent family history.  Social History     Socioeconomic History    Marital status: Single     Spouse name: Not on file    Number of children: Not on file    Years of education: Not on file    Highest education level: Not on file   Occupational History    Not on file   Tobacco Use    Smoking status: Former     Current packs/day: 0.00     Average packs/day: 0.3 packs/day for 30.0 years (7.5 ttl pk-yrs)     Types: Cigarettes     Start date: 1965     Quit date: 1995     Years since quittin.0    Smokeless tobacco: Never   Substance and Sexual Activity    Alcohol use: No    Drug use: No    Sexual activity: Not Currently     Comment:    Other Topics Concern    Not on file   Social History Narrative    Not on file     Social Determinants of Health     Financial Resource Strain: Patient Declined (2023)    Overall Financial Resource Strain (CARDIA)     Difficulty of Paying Living Expenses: Patient declined   Food Insecurity: Not on file (2023)   Transportation Needs: Unknown (2023)    PRAPARE - Transportation     Lack of Transportation (Medical): Not on file     Lack of Transportation (Non-Medical): Patient declined   Physical Activity: Insufficiently Active (2023)    Exercise Vital Sign     Days of Exercise per Week: 2 days     Minutes of Exercise per Session: 10 min   Stress: Not on file   Social Connections: Not on file   Intimate Partner Violence: Not on file   Housing Stability: Unknown (2023)    Housing Stability Vital Sign     Unable to Pay for Housing in the Last Year: Not on file     Number of Places Lived in the Last Year: Not on file     Unstable Housing in the Last Year: Patient refused       Review of Systems  Feeling well. No

## 2024-01-16 NOTE — ASSESSMENT & PLAN NOTE
Uncontrolled. Not a barrier to proceeding with cataract extraction. Will follow up with me for further management

## 2024-01-16 NOTE — ASSESSMENT & PLAN NOTE
Also with polyneuropathy and nephropathy. Has been well controlled. Scheduled with me for regular surveillance

## 2024-07-02 ENCOUNTER — HOSPITAL ENCOUNTER (OUTPATIENT)
Facility: HOSPITAL | Age: 69
Setting detail: SPECIMEN
Discharge: HOME | End: 2024-07-05
Payer: MEDICARE

## 2024-07-02 ENCOUNTER — OFFICE VISIT (OUTPATIENT)
Facility: CLINIC | Age: 69
End: 2024-07-02
Payer: MEDICARE

## 2024-07-02 VITALS
WEIGHT: 281 LBS | TEMPERATURE: 97 F | HEART RATE: 88 BPM | HEIGHT: 75 IN | SYSTOLIC BLOOD PRESSURE: 170 MMHG | OXYGEN SATURATION: 96 % | BODY MASS INDEX: 34.94 KG/M2 | DIASTOLIC BLOOD PRESSURE: 86 MMHG

## 2024-07-02 DIAGNOSIS — I10 ESSENTIAL HYPERTENSION: ICD-10-CM

## 2024-07-02 DIAGNOSIS — Z79.4 TYPE 2 DIABETES MELLITUS WITH BOTH EYES AFFECTED BY PROLIFERATIVE RETINOPATHY AND MACULAR EDEMA, WITH LONG-TERM CURRENT USE OF INSULIN (HCC): ICD-10-CM

## 2024-07-02 DIAGNOSIS — G81.94 LEFT HEMIPARESIS (HCC): ICD-10-CM

## 2024-07-02 DIAGNOSIS — E11.3513 TYPE 2 DIABETES MELLITUS WITH BOTH EYES AFFECTED BY PROLIFERATIVE RETINOPATHY AND MACULAR EDEMA, WITH LONG-TERM CURRENT USE OF INSULIN (HCC): ICD-10-CM

## 2024-07-02 DIAGNOSIS — H26.9 CATARACT OF RIGHT EYE, UNSPECIFIED CATARACT TYPE: ICD-10-CM

## 2024-07-02 DIAGNOSIS — Z01.818 PRE-OP EVALUATION: Primary | ICD-10-CM

## 2024-07-02 DIAGNOSIS — I69.30 HISTORY OF CVA WITH RESIDUAL DEFICIT: ICD-10-CM

## 2024-07-02 DIAGNOSIS — E66.01 SEVERE OBESITY (BMI 35.0-39.9) WITH COMORBIDITY (HCC): ICD-10-CM

## 2024-07-02 LAB
ALBUMIN SERPL-MCNC: 3.4 G/DL (ref 3.4–5)
ALBUMIN/GLOB SERPL: 1 (ref 0.8–1.7)
ALP SERPL-CCNC: 57 U/L (ref 45–117)
ALT SERPL-CCNC: 37 U/L (ref 16–61)
ANION GAP SERPL CALC-SCNC: 5 MMOL/L (ref 3–18)
AST SERPL-CCNC: 27 U/L (ref 10–38)
BILIRUB SERPL-MCNC: 0.4 MG/DL (ref 0.2–1)
BUN SERPL-MCNC: 19 MG/DL (ref 7–18)
BUN/CREAT SERPL: 15 (ref 12–20)
CALCIUM SERPL-MCNC: 9.3 MG/DL (ref 8.5–10.1)
CHLORIDE SERPL-SCNC: 105 MMOL/L (ref 100–111)
CHOLEST SERPL-MCNC: 159 MG/DL
CO2 SERPL-SCNC: 28 MMOL/L (ref 21–32)
CREAT SERPL-MCNC: 1.26 MG/DL (ref 0.6–1.3)
CREAT UR-MCNC: 136 MG/DL (ref 30–125)
EST. AVERAGE GLUCOSE BLD GHB EST-MCNC: 160 MG/DL
GLOBULIN SER CALC-MCNC: 3.4 G/DL (ref 2–4)
GLUCOSE SERPL-MCNC: 289 MG/DL (ref 74–99)
HBA1C MFR BLD: 7.2 % (ref 4.2–5.6)
HDLC SERPL-MCNC: 31 MG/DL (ref 40–60)
HDLC SERPL: 5.1 (ref 0–5)
LDLC SERPL CALC-MCNC: 83.4 MG/DL (ref 0–100)
LIPID PANEL: ABNORMAL
MICROALBUMIN UR-MCNC: 163 MG/DL (ref 0–3)
MICROALBUMIN/CREAT UR-RTO: 1199 MG/G (ref 0–30)
POTASSIUM SERPL-SCNC: 4.5 MMOL/L (ref 3.5–5.5)
PROT SERPL-MCNC: 6.8 G/DL (ref 6.4–8.2)
SODIUM SERPL-SCNC: 138 MMOL/L (ref 136–145)
TRIGL SERPL-MCNC: 223 MG/DL
VLDLC SERPL CALC-MCNC: 44.6 MG/DL

## 2024-07-02 PROCEDURE — G8417 CALC BMI ABV UP PARAM F/U: HCPCS | Performed by: FAMILY MEDICINE

## 2024-07-02 PROCEDURE — 80053 COMPREHEN METABOLIC PANEL: CPT

## 2024-07-02 PROCEDURE — 99214 OFFICE O/P EST MOD 30 MIN: CPT | Performed by: FAMILY MEDICINE

## 2024-07-02 PROCEDURE — 82570 ASSAY OF URINE CREATININE: CPT

## 2024-07-02 PROCEDURE — 3077F SYST BP >= 140 MM HG: CPT | Performed by: FAMILY MEDICINE

## 2024-07-02 PROCEDURE — 3079F DIAST BP 80-89 MM HG: CPT | Performed by: FAMILY MEDICINE

## 2024-07-02 PROCEDURE — 3046F HEMOGLOBIN A1C LEVEL >9.0%: CPT | Performed by: FAMILY MEDICINE

## 2024-07-02 PROCEDURE — 3017F COLORECTAL CA SCREEN DOC REV: CPT | Performed by: FAMILY MEDICINE

## 2024-07-02 PROCEDURE — 1036F TOBACCO NON-USER: CPT | Performed by: FAMILY MEDICINE

## 2024-07-02 PROCEDURE — 82043 UR ALBUMIN QUANTITATIVE: CPT

## 2024-07-02 PROCEDURE — G8427 DOCREV CUR MEDS BY ELIG CLIN: HCPCS | Performed by: FAMILY MEDICINE

## 2024-07-02 PROCEDURE — 83036 HEMOGLOBIN GLYCOSYLATED A1C: CPT

## 2024-07-02 PROCEDURE — 1123F ACP DISCUSS/DSCN MKR DOCD: CPT | Performed by: FAMILY MEDICINE

## 2024-07-02 PROCEDURE — 80061 LIPID PANEL: CPT

## 2024-07-02 PROCEDURE — 36415 COLL VENOUS BLD VENIPUNCTURE: CPT

## 2024-07-02 PROCEDURE — 2022F DILAT RTA XM EVC RTNOPTHY: CPT | Performed by: FAMILY MEDICINE

## 2024-07-02 SDOH — ECONOMIC STABILITY: FOOD INSECURITY: WITHIN THE PAST 12 MONTHS, THE FOOD YOU BOUGHT JUST DIDN'T LAST AND YOU DIDN'T HAVE MONEY TO GET MORE.: NEVER TRUE

## 2024-07-02 SDOH — ECONOMIC STABILITY: FOOD INSECURITY: WITHIN THE PAST 12 MONTHS, YOU WORRIED THAT YOUR FOOD WOULD RUN OUT BEFORE YOU GOT MONEY TO BUY MORE.: NEVER TRUE

## 2024-07-02 SDOH — ECONOMIC STABILITY: HOUSING INSECURITY
IN THE LAST 12 MONTHS, WAS THERE A TIME WHEN YOU DID NOT HAVE A STEADY PLACE TO SLEEP OR SLEPT IN A SHELTER (INCLUDING NOW)?: NO

## 2024-07-02 SDOH — ECONOMIC STABILITY: INCOME INSECURITY: HOW HARD IS IT FOR YOU TO PAY FOR THE VERY BASICS LIKE FOOD, HOUSING, MEDICAL CARE, AND HEATING?: NOT HARD AT ALL

## 2024-07-02 NOTE — PROGRESS NOTES
Preoperative Consultation      Yovanny Coelho Jr.  YOB: 1955    Date of Service:  7/2/2024    Vitals:    07/02/24 1342   BP: (!) 168/86   Site: Left Upper Arm   Position: Sitting   Pulse: 93   Temp: 97 °F (36.1 °C)   TempSrc: Tympanic   SpO2: 96%   Weight: 127.5 kg (281 lb)   Height: 1.905 m (6' 3\")      Wt Readings from Last 2 Encounters:   07/02/24 127.5 kg (281 lb)   01/16/24 126.1 kg (278 lb)     BP Readings from Last 3 Encounters:   07/02/24 (!) 168/86   01/16/24 (!) 142/64   11/20/23 130/68        Chief Complaint   Patient presents with    Pre-op Exam       History of Present Illness      The patient is scheduled for right eye cataract extraction on 07/17/2023. He denies experiencing any chest heaviness or shortness of breath during exertion. He reports occasional dizziness upon standing too quickly. Hedenies any recent fevers.    The patient's blood pressure has been elevated episodically recently, with a reading of 200 the last night. He is currently on Losartan 100 mg and Amlodipine 5 mg. His home blood pressure readings are typically between 120 and 134.     Typically takes BP meds in the morning. Recently started working at the [employer] and recognizes he is sometimes missing his morning meds.      Allergies   Allergen Reactions    Exenatide Other (See Comments)     Pancreatits  Other reaction(s): Abdominal pain    Lisinopril Cough    Rosuvastatin Itching     Outpatient Medications Marked as Taking for the 7/2/24 encounter (Office Visit) with Carito Norman MD   Medication Sig Dispense Refill    terbinafine (LAMISIL) 250 MG tablet Take 1 tablet by mouth daily 90 tablet 0    metFORMIN (GLUCOPHAGE) 500 MG tablet Take 1 tablet by mouth 2 times daily (with meals) 180 tablet 3    vitamin D (CHOLECALCIFEROL) 25 MCG (1000 UT) TABS tablet Take 2 tablets by mouth daily      sildenafil (VIAGRA) 100 MG tablet Take 1 tablet by mouth      aspirin 325 MG tablet Take 1 tablet by mouth daily

## 2024-07-02 NOTE — ASSESSMENT & PLAN NOTE
Uncontrolled today due to missing medications. Reportedly normal with adherence. Move daily meds to evening to promote adherence. Schedule follow up next week. Not a barrier to proceeding with cataract extraction if home reports are correct. Planning follow up next week

## 2024-07-02 NOTE — PROGRESS NOTES
Chief Complaint   Patient presents with    Pre-op Exam           \"Have you been to the ER, urgent care clinic since your last visit?  Hospitalized since your last visit?\"    NO    “Have you seen or consulted any other health care providers outside of [facility] since your last visit?”    NO            Click Here for Release of Records Request

## 2024-07-09 ENCOUNTER — OFFICE VISIT (OUTPATIENT)
Facility: CLINIC | Age: 69
End: 2024-07-09
Payer: MEDICARE

## 2024-07-09 VITALS
WEIGHT: 280 LBS | BODY MASS INDEX: 34.82 KG/M2 | SYSTOLIC BLOOD PRESSURE: 130 MMHG | TEMPERATURE: 97.8 F | DIASTOLIC BLOOD PRESSURE: 68 MMHG | OXYGEN SATURATION: 96 % | HEIGHT: 75 IN | HEART RATE: 87 BPM

## 2024-07-09 DIAGNOSIS — Z79.4 TYPE 2 DIABETES MELLITUS WITH BOTH EYES AFFECTED BY PROLIFERATIVE RETINOPATHY AND MACULAR EDEMA, WITH LONG-TERM CURRENT USE OF INSULIN (HCC): Primary | ICD-10-CM

## 2024-07-09 DIAGNOSIS — E11.3513 TYPE 2 DIABETES MELLITUS WITH BOTH EYES AFFECTED BY PROLIFERATIVE RETINOPATHY AND MACULAR EDEMA, WITH LONG-TERM CURRENT USE OF INSULIN (HCC): Primary | ICD-10-CM

## 2024-07-09 DIAGNOSIS — E11.21 TYPE 2 DIABETES WITH NEPHROPATHY (HCC): ICD-10-CM

## 2024-07-09 DIAGNOSIS — E11.21 DIABETIC NEPHROPATHY ASSOCIATED WITH TYPE 2 DIABETES MELLITUS (HCC): ICD-10-CM

## 2024-07-09 DIAGNOSIS — E11.3213 BOTH EYES AFFECTED BY MILD NONPROLIFERATIVE DIABETIC RETINOPATHY WITH MACULAR EDEMA, ASSOCIATED WITH TYPE 2 DIABETES MELLITUS (HCC): ICD-10-CM

## 2024-07-09 DIAGNOSIS — I10 ESSENTIAL HYPERTENSION: ICD-10-CM

## 2024-07-09 DIAGNOSIS — R80.1 PERSISTENT PROTEINURIA: ICD-10-CM

## 2024-07-09 PROCEDURE — 3051F HG A1C>EQUAL 7.0%<8.0%: CPT | Performed by: FAMILY MEDICINE

## 2024-07-09 PROCEDURE — 3078F DIAST BP <80 MM HG: CPT | Performed by: FAMILY MEDICINE

## 2024-07-09 PROCEDURE — G8427 DOCREV CUR MEDS BY ELIG CLIN: HCPCS | Performed by: FAMILY MEDICINE

## 2024-07-09 PROCEDURE — 3075F SYST BP GE 130 - 139MM HG: CPT | Performed by: FAMILY MEDICINE

## 2024-07-09 PROCEDURE — G2211 COMPLEX E/M VISIT ADD ON: HCPCS | Performed by: FAMILY MEDICINE

## 2024-07-09 PROCEDURE — G8417 CALC BMI ABV UP PARAM F/U: HCPCS | Performed by: FAMILY MEDICINE

## 2024-07-09 PROCEDURE — 99214 OFFICE O/P EST MOD 30 MIN: CPT | Performed by: FAMILY MEDICINE

## 2024-07-09 PROCEDURE — 2022F DILAT RTA XM EVC RTNOPTHY: CPT | Performed by: FAMILY MEDICINE

## 2024-07-09 PROCEDURE — 1123F ACP DISCUSS/DSCN MKR DOCD: CPT | Performed by: FAMILY MEDICINE

## 2024-07-09 PROCEDURE — 1036F TOBACCO NON-USER: CPT | Performed by: FAMILY MEDICINE

## 2024-07-09 PROCEDURE — 3017F COLORECTAL CA SCREEN DOC REV: CPT | Performed by: FAMILY MEDICINE

## 2024-07-09 RX ORDER — INSULIN GLARGINE 100 [IU]/ML
52 INJECTION, SOLUTION SUBCUTANEOUS DAILY
Qty: 5 ADJUSTABLE DOSE PRE-FILLED PEN SYRINGE | Refills: 4 | Status: SHIPPED | OUTPATIENT
Start: 2024-07-09

## 2024-07-09 RX ORDER — FUROSEMIDE 40 MG/1
40 TABLET ORAL DAILY
Qty: 90 TABLET | Refills: 1 | Status: SHIPPED | OUTPATIENT
Start: 2024-07-09

## 2024-07-09 RX ORDER — LOSARTAN POTASSIUM 100 MG/1
100 TABLET ORAL DAILY
Qty: 90 TABLET | Refills: 1 | Status: SHIPPED | OUTPATIENT
Start: 2024-07-09

## 2024-07-09 RX ORDER — AMLODIPINE BESYLATE 10 MG/1
10 TABLET ORAL DAILY
Qty: 90 TABLET | Refills: 1 | Status: SHIPPED | OUTPATIENT
Start: 2024-07-09

## 2024-07-09 NOTE — ASSESSMENT & PLAN NOTE
Uncontrolled but improving. Asymptomatic. Increase amlodipine to 10 mg. Continue losartan 100 mg and furosemide 40 mg. normal K without replacement. follow up 1 month

## 2024-07-09 NOTE — ASSESSMENT & PLAN NOTE
Also with nephropathy, vascular disease and neuropathy. Uncontrolled with A1c 7.2. GLP1 RA contraindicated by prior hx pancreatitis. Continue basal insulin 52 units and mealtime 14 units. Resume empagliflozin 25 mg tabs 1/2 daily

## 2024-07-09 NOTE — ASSESSMENT & PLAN NOTE
Significant proteinuria with microalb/cr 1199 mg/g. Continue losartan 100 mg. Referring to nephrology colleagues.

## 2024-07-09 NOTE — PROGRESS NOTES
Chief Complaint   Patient presents with    Diabetes    Hypertension         \"Have you been to the ER, urgent care clinic since your last visit?  Hospitalized since your last visit?\"    NO    “Have you seen or consulted any other health care providers outside of [facility] since your last visit?”    NO            Click Here for Release of Records Request'  
20   Final    Est Glogurjit Filt Rate 07/02/2024 63  >60 ml/min/1.73m2 Final    Comment:    Pediatric calculator link: https://www.kidney.org/professionals/kdoqi/gfr_calculatorped     These results are not intended for use in patients <18 years of age.     eGFR results are calculated without a race factor using  the 2021 CKD-EPI equation. Careful clinical correlation is recommended, particularly when comparing to results calculated using previous equations.  The CKD-EPI equation is less accurate in patients with extremes of muscle mass, extra-renal metabolism of creatinine, excessive creatine ingestion, or following therapy that affects renal tubular secretion.      Calcium 07/02/2024 9.3  8.5 - 10.1 MG/DL Final    Total Bilirubin 07/02/2024 0.4  0.2 - 1.0 MG/DL Final    ALT 07/02/2024 37  16 - 61 U/L Final    AST 07/02/2024 27  10 - 38 U/L Final    Alk Phosphatase 07/02/2024 57  45 - 117 U/L Final    Total Protein 07/02/2024 6.8  6.4 - 8.2 g/dL Final    Albumin 07/02/2024 3.4  3.4 - 5.0 g/dL Final    Globulin 07/02/2024 3.4  2.0 - 4.0 g/dL Final    Albumin/Globulin Ratio 07/02/2024 1.0  0.8 - 1.7   Final    LIPID PANEL 07/02/2024      Final    Cholesterol, Total 07/02/2024 159  <200 MG/DL Final    Triglycerides 07/02/2024 223 (H)  <150 MG/DL Final    Comment: The drugs N-acetylcysteine (NAC) and  Metamiszole have been found to cause falsely  low results in this chemical assay. Please  be sure to submit blood samples obtained  BEFORE administration of either of these  drugs to assure correct results.      HDL 07/02/2024 31 (L)  40 - 60 MG/DL Final    LDL Cholesterol 07/02/2024 83.4  0 - 100 MG/DL Final    VLDL Cholesterol Calculated 07/02/2024 44.6  MG/DL Final    Chol/HDL Ratio 07/02/2024 5.1 (H)  0 - 5.0   Final    Microalb, Ur 07/02/2024 163.00 (H)  0 - 3.0 MG/DL Final    Creatinine, Ur 07/02/2024 136.00 (H)  30 - 125 mg/dL Final    Microalb/Creat Ratio 07/02/2024 1199 (H)  0 - 30 mg/g Final         /68 (Site:

## 2024-07-10 ENCOUNTER — TELEPHONE (OUTPATIENT)
Facility: CLINIC | Age: 69
End: 2024-07-10

## 2024-07-11 ENCOUNTER — TELEPHONE (OUTPATIENT)
Facility: CLINIC | Age: 69
End: 2024-07-11

## 2024-07-11 NOTE — TELEPHONE ENCOUNTER
Received call from [facility] concerning the follow up appointment on 7/9/24 concerning releasing patient for cataract surgery. Office notes has been faxed over to [phone].

## 2024-09-24 ENCOUNTER — HOSPITAL ENCOUNTER (OUTPATIENT)
Facility: HOSPITAL | Age: 69
Discharge: HOME | End: 2024-09-27
Attending: INTERNAL MEDICINE
Payer: MEDICARE

## 2024-09-24 DIAGNOSIS — N18.31 STAGE 3A CHRONIC KIDNEY DISEASE (HCC): ICD-10-CM

## 2024-09-24 PROCEDURE — 76770 US EXAM ABDO BACK WALL COMP: CPT

## 2024-10-15 DIAGNOSIS — Z79.4 TYPE 2 DIABETES MELLITUS WITH BOTH EYES AFFECTED BY PROLIFERATIVE RETINOPATHY AND MACULAR EDEMA, WITH LONG-TERM CURRENT USE OF INSULIN (HCC): ICD-10-CM

## 2024-10-15 DIAGNOSIS — E11.21 TYPE 2 DIABETES WITH NEPHROPATHY (HCC): ICD-10-CM

## 2024-10-15 DIAGNOSIS — N18.31 STAGE 3A CHRONIC KIDNEY DISEASE (HCC): ICD-10-CM

## 2024-10-15 DIAGNOSIS — E11.3513 TYPE 2 DIABETES MELLITUS WITH BOTH EYES AFFECTED BY PROLIFERATIVE RETINOPATHY AND MACULAR EDEMA, WITH LONG-TERM CURRENT USE OF INSULIN (HCC): ICD-10-CM

## 2024-10-15 NOTE — TELEPHONE ENCOUNTER
This patient contacted office for the following prescriptions to be filled:    Medication requested :   metFORMIN (GLUCOPHAGE) 500 MG tablet     insulin glargine (LANTUS SOLOSTAR) 100 UNIT/ML injection pen     insulin aspart (NOVOLOG) 100 UNIT/ML injection pen   PCP: [facility]  Pharmacy or Print: PHARMACY   Mail order or Local pharmacy LOCAL     Scheduled appointment if not seen by current providers in office: LOV: 7/9/2024 UPCOMING: Attempted to call patient back to schedule a follow up appt. No answer LMOV.

## 2024-10-18 NOTE — TELEPHONE ENCOUNTER
Both insulins were last sent in on 7/9/24 5 with 3 refills, metformin was sent in last on 8/11/23 180 with 3 refills. Pt has follow up scheduled and is request meds prior.

## 2024-10-23 RX ORDER — INSULIN GLARGINE 100 [IU]/ML
52 INJECTION, SOLUTION SUBCUTANEOUS DAILY
Qty: 5 ADJUSTABLE DOSE PRE-FILLED PEN SYRINGE | Refills: 1 | Status: SHIPPED | OUTPATIENT
Start: 2024-10-23

## 2024-10-23 NOTE — TELEPHONE ENCOUNTER
He missed his appt for August. No labs were needed for that visit, but he's now due for A1c and chemistry for this visit. Thanks, OBEY

## 2024-10-24 ENCOUNTER — TELEPHONE (OUTPATIENT)
Facility: CLINIC | Age: 69
End: 2024-10-24

## 2024-10-24 NOTE — TELEPHONE ENCOUNTER
Pt called stating that he spoke to his pharmacy who instructed him to reach out to his primary care physician in regards to his blood pressure and diabetic medication. They informed pt that a form would have to be completed and sent to the pharmacy before this medication can be filled. Pt states they did not give him any other specifics other than to contact his pcp's office. Please review and advise as needed.

## 2024-10-25 NOTE — TELEPHONE ENCOUNTER
Spoke to patient who states he needed a [facility] to [facility] with Dr. Norman before they will refill his medications. A request was faxed to the [facility] in September and patient states at that time he was told he could [facility] [place] however when he got there he was told there was not spot for him. Spoke with [facility] asked if a new form needs to be completed or if the form from September could be processed and was told the from from September would be able to be processed so this has been re-faxed.

## 2024-10-25 NOTE — TELEPHONE ENCOUNTER
Called patient back who states he was told by the [facility] a continuity of care form needs to be completed for patent to continue to see Dr. Norman before his medications can be filled. Made patient aware a form was sent in for this in September. Patient states he was contacted by the [facility] and was told at that time \"they had a spot for him in [place]\" but when he got there they did not have a spot for him. Told patient I will see if we need to submit a new form or if the one from September will be able to be processed. He has also been made aware that Dr. Norman is not back in the office until Tuesday to sign the form if a new one will be required. Called over to [facility] and was told we do not need to submit a new request they have the request that was submitted in September will be reviewed. Form that was completed in September has been re-faxed. Confirmation was received.

## 2024-10-29 NOTE — TELEPHONE ENCOUNTER
Patient called the office stating he received a call from the [facility] stating his referral to continue care with Dr. Norman has been approved. He was made aware I had also received a fax from the [facility] today on his Nephrology referral that was sent to them back in July and they had denied the services however I will reach out to the [facility] to discuss this since the referral has been renewed for him to continue to see Dr. Norman we may be able to get the Nephrology referral approved.

## 2024-11-01 LAB
ANION GAP SERPL CALCULATED.3IONS-SCNC: 10 MMOL/L (ref 3–15)
BUN BLDV-MCNC: 18 MG/DL (ref 6–22)
CALCIUM SERPL-MCNC: 9.2 MG/DL (ref 8.4–10.5)
CHLORIDE BLD-SCNC: 97 MMOL/L (ref 98–110)
CO2: 27 MMOL/L (ref 20–32)
CREAT SERPL-MCNC: 1.2 MG/DL (ref 0.8–1.6)
ESTIMATED AVERAGE GLUCOSE: 231 MG/DL (ref 91–123)
GFR, ESTIMATED: >60 ML/MIN/1.73 SQ.M.
GLUCOSE: 344 MG/DL (ref 70–99)
HBA1C MFR BLD: 9.7 % (ref 4.8–5.6)
POTASSIUM SERPL-SCNC: 4.2 MMOL/L (ref 3.5–5.5)
SODIUM BLD-SCNC: 134 MMOL/L (ref 133–145)

## 2024-11-19 NOTE — TELEPHONE ENCOUNTER
Patient bought a letter to the office that he received from [facility]. Called patient to get clarification on letter. Patient states the letter is stating he can continue to see Dr. Norman. He has been scheduled on 12/20/24. Letter scanned to patient chart.

## 2024-12-20 ENCOUNTER — OFFICE VISIT (OUTPATIENT)
Facility: CLINIC | Age: 69
End: 2024-12-20

## 2024-12-20 VITALS
DIASTOLIC BLOOD PRESSURE: 72 MMHG | OXYGEN SATURATION: 96 % | TEMPERATURE: 97.8 F | HEART RATE: 71 BPM | BODY MASS INDEX: 34.14 KG/M2 | HEIGHT: 75 IN | SYSTOLIC BLOOD PRESSURE: 136 MMHG | WEIGHT: 274.6 LBS

## 2024-12-20 DIAGNOSIS — Z79.4 TYPE 2 DIABETES MELLITUS WITH BOTH EYES AFFECTED BY PROLIFERATIVE RETINOPATHY AND MACULAR EDEMA, WITH LONG-TERM CURRENT USE OF INSULIN (HCC): Primary | ICD-10-CM

## 2024-12-20 DIAGNOSIS — I10 ESSENTIAL HYPERTENSION: ICD-10-CM

## 2024-12-20 DIAGNOSIS — G47.33 OSA ON CPAP: ICD-10-CM

## 2024-12-20 DIAGNOSIS — E11.3513 TYPE 2 DIABETES MELLITUS WITH BOTH EYES AFFECTED BY PROLIFERATIVE RETINOPATHY AND MACULAR EDEMA, WITH LONG-TERM CURRENT USE OF INSULIN (HCC): Primary | ICD-10-CM

## 2024-12-20 DIAGNOSIS — E11.21 TYPE 2 DIABETES WITH NEPHROPATHY (HCC): ICD-10-CM

## 2024-12-20 DIAGNOSIS — Z85.46 HISTORY OF PROSTATE CANCER: ICD-10-CM

## 2024-12-20 DIAGNOSIS — E55.9 VITAMIN D DEFICIENCY: ICD-10-CM

## 2024-12-20 DIAGNOSIS — E78.5 HYPERLIPIDEMIA, UNSPECIFIED HYPERLIPIDEMIA TYPE: ICD-10-CM

## 2024-12-20 RX ORDER — LOSARTAN POTASSIUM 100 MG/1
100 TABLET ORAL DAILY
Qty: 90 TABLET | Refills: 3 | Status: SHIPPED | OUTPATIENT
Start: 2024-12-20

## 2024-12-20 RX ORDER — INSULIN GLARGINE 100 [IU]/ML
52 INJECTION, SOLUTION SUBCUTANEOUS DAILY
Qty: 5 ADJUSTABLE DOSE PRE-FILLED PEN SYRINGE | Refills: 1 | Status: SHIPPED | OUTPATIENT
Start: 2024-12-20

## 2024-12-20 RX ORDER — FUROSEMIDE 40 MG/1
40 TABLET ORAL DAILY
Qty: 90 TABLET | Refills: 3 | Status: SHIPPED | OUTPATIENT
Start: 2024-12-20

## 2024-12-20 RX ORDER — ROSUVASTATIN CALCIUM 20 MG/1
20 TABLET, COATED ORAL DAILY
Qty: 90 TABLET | Refills: 3 | Status: SHIPPED | OUTPATIENT
Start: 2024-12-20

## 2024-12-20 RX ORDER — AMLODIPINE BESYLATE 10 MG/1
10 TABLET ORAL DAILY
Qty: 90 TABLET | Refills: 3 | Status: SHIPPED | OUTPATIENT
Start: 2024-12-20

## 2024-12-20 NOTE — ASSESSMENT & PLAN NOTE
Not adherent. Intolerant of atorvastatin   - Transition to rosuvastatin   - Assess vitamin D levels during the next lab check

## 2024-12-20 NOTE — PROGRESS NOTES
Chief Complaint   Patient presents with    Diabetes    Hypertension     Patient here to be seen for his chronic condition follow up and lab review. He is also requesting refills.     Cramps     Patient c/o having severe cramps at night and is concerned this may be due to his medications    Referral - General     He would be like be referred to Pulmonary for CPAP maintenance        \"Have you been to the ER, urgent care clinic since your last visit?  Hospitalized since your last visit?\"    NO    “Have you seen or consulted any other health care providers outside our system since your last visit?”    NO      “Have you had a diabetic eye exam?”    NO     Date of last diabetic eye exam: 11/7/2023            
Sodium 11/01/2024 134  133 - 145 mmol/L Final    Chloride 11/01/2024 97 (L)  98 - 110 mmol/L Final    Glucose 11/01/2024 344 (H)  70 - 99 mg/dL Final    Calcium 11/01/2024 9.2  8.4 - 10.5 mg/dL Final    BUN 11/01/2024 18  6 - 22 mg/dL Final    Creatinine 11/01/2024 1.2  0.8 - 1.6 mg/dL Final    CO2 11/01/2024 27  20 - 32 mmol/L Final    Est, Glom Filt Rate 11/01/2024 >60.0  >60.0 mL/min/1.73 sq.m. Final    Comment: eGFR calculation based on the Chronic Kidney Disease Epidemiology   Collaboration  (CKD-EPI) equation refit without adjustment for race.    This eGFR is validated for stable chronic renal failure patients. This   equation  is unreliable in acute illness or patients with normal renal function.        Anion Gap 11/01/2024 10.0  3.0 - 15.0 mmol/L Final    Comment: Anion Gap calculation based on electrolyte reference ranges.  Test includes BUN, CO2, Chloride, Creatinine, Glucose, Potassium, Calcium and  Sodium.         Lab Results   Component Value Date/Time    VITD25 42.7 10/18/2022 10:03 AM      Lab Results   Component Value Date    CHOL 159 07/02/2024    TRIG 223 (H) 07/02/2024    HDL 31 (L) 07/02/2024    LDL 83.4 07/02/2024    VLDL 44.6 07/02/2024    CHOLHDLRATIO 5.1 (H) 07/02/2024         Current Outpatient Medications   Medication Sig Dispense Refill    vitamin D (CHOLECALCIFEROL) 25 MCG (1000 UT) TABS tablet Take 2 tablets by mouth daily 180 tablet 3    amLODIPine (NORVASC) 10 MG tablet Take 1 tablet by mouth daily 90 tablet 3    furosemide (LASIX) 40 MG tablet Take 1 tablet by mouth daily 90 tablet 3    losartan (COZAAR) 100 MG tablet Take 1 tablet by mouth daily 90 tablet 3    metFORMIN (GLUCOPHAGE) 1000 MG tablet Take 1 tablet by mouth 2 times daily (with meals) 180 tablet 1    empagliflozin (JARDIANCE) 25 MG tablet Take 0.5 tablets by mouth daily 45 tablet 1    insulin aspart (NOVOLOG) 100 UNIT/ML injection pen Inject 14 Units into the skin 2 times daily (with meals) 5 Adjustable Dose Pre-filled

## 2024-12-20 NOTE — ASSESSMENT & PLAN NOTE
Also with nephropathy, vascular disease and neuropathy. GLP1 RA contraindicated by prior hx pancreatitis. Worse with A1c 9.7   Some confusion over home insulins. Unclear why didn't receive empagliflozin Bring meds to next visit.  -Continue basal insulin 52 units and mealtime 14 units.   -Resume empagliflozin 25 mg tabs 1/2 daily  -Increase metformin to 1000 mg bid   - Follow up with ophthalmologist regarding diabetic retinopathy (scheduled Jan)  - Follow up with nephrologist

## 2024-12-20 NOTE — ASSESSMENT & PLAN NOTE
Uncontrolled but improving. BRYSON uncontrolled.   -Continue present management with amlodipine to 10 mg, losartan 100 mg and furosemide 40 mg. normal K without replacement.  -Reassess after BRYSON controlled.

## 2025-01-06 ENCOUNTER — TELEPHONE (OUTPATIENT)
Facility: CLINIC | Age: 70
End: 2025-01-06

## 2025-01-06 NOTE — TELEPHONE ENCOUNTER
Patient is requesting (New) referral to the following office:    Speciality: Sleep Specialist   Specialist Name:   Office Location:   Phone (if available):   Fax (if available):   Diagnosis: Sleep Apnea   Date of appointment (if scheduled):

## 2025-01-06 NOTE — TELEPHONE ENCOUNTER
[facility]. Form has been completed and placed in Dr. Norman's office to be signed.

## 2025-02-04 ASSESSMENT — SLEEP AND FATIGUE QUESTIONNAIRES
ESS TOTAL SCORE: 14
HOW LIKELY ARE YOU TO NOD OFF OR FALL ASLEEP WHILE SITTING AND READING: MODERATE CHANCE OF DOZING
HOW LIKELY ARE YOU TO NOD OFF OR FALL ASLEEP WHEN YOU ARE A PASSENGER IN A CAR FOR AN HOUR WITHOUT A BREAK: MODERATE CHANCE OF DOZING
HOW LIKELY ARE YOU TO NOD OFF OR FALL ASLEEP WHILE SITTING INACTIVE IN A PUBLIC PLACE: SLIGHT CHANCE OF DOZING
HOW LIKELY ARE YOU TO NOD OFF OR FALL ASLEEP WHILE WATCHING TV: MODERATE CHANCE OF DOZING
HOW LIKELY ARE YOU TO NOD OFF OR FALL ASLEEP WHILE SITTING QUIETLY AFTER LUNCH WITHOUT ALCOHOL: HIGH CHANCE OF DOZING
HOW LIKELY ARE YOU TO NOD OFF OR FALL ASLEEP WHILE LYING DOWN TO REST IN THE AFTERNOON WHEN CIRCUMSTANCES PERMIT: HIGH CHANCE OF DOZING
HOW LIKELY ARE YOU TO NOD OFF OR FALL ASLEEP IN A CAR, WHILE STOPPED FOR A FEW MINUTES IN TRAFFIC: WOULD NEVER DOZE
HOW LIKELY ARE YOU TO NOD OFF OR FALL ASLEEP WHILE SITTING AND TALKING TO SOMEONE: SLIGHT CHANCE OF DOZING

## 2025-02-05 ENCOUNTER — TELEPHONE (OUTPATIENT)
Facility: CLINIC | Age: 70
End: 2025-02-05

## 2025-02-05 NOTE — TELEPHONE ENCOUNTER
Spoke with patient who was made aware the referral was faxed to [facility] on 1/7/25 for sleep med.

## 2025-02-05 NOTE — TELEPHONE ENCOUNTER
Patient called stating that he has an appoint with sleep study on Tomorrow, and is requesting a call back from clinical regarding information they are requesting. Please review and advise. He can be reached at [phone] .

## 2025-03-21 ENCOUNTER — HOSPITAL ENCOUNTER (OUTPATIENT)
Facility: HOSPITAL | Age: 70
Setting detail: SPECIMEN
Discharge: HOME | End: 2025-03-24
Payer: MEDICARE

## 2025-03-21 ENCOUNTER — OFFICE VISIT (OUTPATIENT)
Facility: CLINIC | Age: 70
End: 2025-03-21
Payer: OTHER GOVERNMENT

## 2025-03-21 VITALS
WEIGHT: 272 LBS | HEART RATE: 88 BPM | TEMPERATURE: 97.6 F | DIASTOLIC BLOOD PRESSURE: 88 MMHG | SYSTOLIC BLOOD PRESSURE: 144 MMHG | HEIGHT: 75 IN | OXYGEN SATURATION: 95 % | BODY MASS INDEX: 33.82 KG/M2

## 2025-03-21 DIAGNOSIS — E55.9 VITAMIN D DEFICIENCY: ICD-10-CM

## 2025-03-21 DIAGNOSIS — E11.3513 TYPE 2 DIABETES MELLITUS WITH BOTH EYES AFFECTED BY PROLIFERATIVE RETINOPATHY AND MACULAR EDEMA, WITH LONG-TERM CURRENT USE OF INSULIN (HCC): ICD-10-CM

## 2025-03-21 DIAGNOSIS — M17.11 ARTHRITIS OF RIGHT KNEE: Primary | ICD-10-CM

## 2025-03-21 DIAGNOSIS — G81.94 LEFT HEMIPARESIS (HCC): ICD-10-CM

## 2025-03-21 DIAGNOSIS — M25.461 EFFUSION OF RIGHT KNEE: ICD-10-CM

## 2025-03-21 DIAGNOSIS — Z79.4 TYPE 2 DIABETES MELLITUS WITH BOTH EYES AFFECTED BY PROLIFERATIVE RETINOPATHY AND MACULAR EDEMA, WITH LONG-TERM CURRENT USE OF INSULIN (HCC): ICD-10-CM

## 2025-03-21 DIAGNOSIS — M25.361 INSTABILITY OF KNEE JOINT, RIGHT: ICD-10-CM

## 2025-03-21 DIAGNOSIS — N18.2 CKD STAGE G2/A3, GFR 60-89 AND ALBUMIN CREATININE RATIO >300 MG/G: ICD-10-CM

## 2025-03-21 DIAGNOSIS — E11.21 TYPE 2 DIABETES WITH NEPHROPATHY (HCC): ICD-10-CM

## 2025-03-21 PROBLEM — N18.31 STAGE 3A CHRONIC KIDNEY DISEASE (HCC): Status: ACTIVE | Noted: 2025-03-21

## 2025-03-21 PROBLEM — N18.31 STAGE 3A CHRONIC KIDNEY DISEASE (HCC): Status: RESOLVED | Noted: 2025-03-21 | Resolved: 2025-03-21

## 2025-03-21 LAB
25(OH)D3 SERPL-MCNC: 46 NG/ML (ref 30–100)
ALBUMIN SERPL-MCNC: 3.7 G/DL (ref 3.4–5)
ALBUMIN/GLOB SERPL: 0.9 (ref 0.8–1.7)
ALP SERPL-CCNC: 77 U/L (ref 45–117)
ALT SERPL-CCNC: 26 U/L (ref 16–61)
ANION GAP SERPL CALC-SCNC: 5 MMOL/L (ref 3–18)
AST SERPL-CCNC: 18 U/L (ref 10–38)
BILIRUB SERPL-MCNC: 0.5 MG/DL (ref 0.2–1)
BUN SERPL-MCNC: 17 MG/DL (ref 7–18)
BUN/CREAT SERPL: 13 (ref 12–20)
CALCIUM SERPL-MCNC: 8.9 MG/DL (ref 8.5–10.1)
CHLORIDE SERPL-SCNC: 101 MMOL/L (ref 100–111)
CHOLEST SERPL-MCNC: 236 MG/DL
CO2 SERPL-SCNC: 27 MMOL/L (ref 21–32)
CREAT SERPL-MCNC: 1.36 MG/DL (ref 0.6–1.3)
CREAT UR-MCNC: 180 MG/DL (ref 30–125)
EST. AVERAGE GLUCOSE BLD GHB EST-MCNC: 249 MG/DL
GLOBULIN SER CALC-MCNC: 4 G/DL (ref 2–4)
GLUCOSE SERPL-MCNC: 264 MG/DL (ref 74–99)
HBA1C MFR BLD: 10.3 % (ref 4.2–5.6)
HDLC SERPL-MCNC: 34 MG/DL (ref 40–60)
HDLC SERPL: 6.9 (ref 0–5)
LDLC SERPL CALC-MCNC: 150.2 MG/DL (ref 0–100)
LIPID PANEL: ABNORMAL
MICROALBUMIN UR-MCNC: 81.7 MG/DL (ref 0–3)
MICROALBUMIN/CREAT UR-RTO: 454 MG/G (ref 0–30)
POTASSIUM SERPL-SCNC: 4.7 MMOL/L (ref 3.5–5.5)
PROT SERPL-MCNC: 7.7 G/DL (ref 6.4–8.2)
SODIUM SERPL-SCNC: 133 MMOL/L (ref 136–145)
TRIGL SERPL-MCNC: 259 MG/DL
VLDLC SERPL CALC-MCNC: 51.8 MG/DL

## 2025-03-21 PROCEDURE — 3077F SYST BP >= 140 MM HG: CPT | Performed by: FAMILY MEDICINE

## 2025-03-21 PROCEDURE — 1123F ACP DISCUSS/DSCN MKR DOCD: CPT | Performed by: FAMILY MEDICINE

## 2025-03-21 PROCEDURE — 99214 OFFICE O/P EST MOD 30 MIN: CPT | Performed by: FAMILY MEDICINE

## 2025-03-21 PROCEDURE — G2211 COMPLEX E/M VISIT ADD ON: HCPCS | Performed by: FAMILY MEDICINE

## 2025-03-21 PROCEDURE — 82306 VITAMIN D 25 HYDROXY: CPT

## 2025-03-21 PROCEDURE — 83036 HEMOGLOBIN GLYCOSYLATED A1C: CPT

## 2025-03-21 PROCEDURE — 80053 COMPREHEN METABOLIC PANEL: CPT

## 2025-03-21 PROCEDURE — 82570 ASSAY OF URINE CREATININE: CPT

## 2025-03-21 PROCEDURE — 3079F DIAST BP 80-89 MM HG: CPT | Performed by: FAMILY MEDICINE

## 2025-03-21 PROCEDURE — 80061 LIPID PANEL: CPT

## 2025-03-21 PROCEDURE — 82043 UR ALBUMIN QUANTITATIVE: CPT

## 2025-03-21 RX ORDER — INSULIN GLARGINE 100 [IU]/ML
52 INJECTION, SOLUTION SUBCUTANEOUS DAILY
Qty: 5 ADJUSTABLE DOSE PRE-FILLED PEN SYRINGE | Refills: 0 | Status: SHIPPED | OUTPATIENT
Start: 2025-03-21

## 2025-03-21 RX ORDER — TRAMADOL HYDROCHLORIDE 50 MG/1
50 TABLET ORAL EVERY 6 HOURS PRN
Qty: 60 TABLET | Refills: 1 | Status: SHIPPED | OUTPATIENT
Start: 2025-03-21 | End: 2025-04-20

## 2025-03-21 SDOH — ECONOMIC STABILITY: FOOD INSECURITY: WITHIN THE PAST 12 MONTHS, THE FOOD YOU BOUGHT JUST DIDN'T LAST AND YOU DIDN'T HAVE MONEY TO GET MORE.: NEVER TRUE

## 2025-03-21 SDOH — ECONOMIC STABILITY: FOOD INSECURITY: WITHIN THE PAST 12 MONTHS, YOU WORRIED THAT YOUR FOOD WOULD RUN OUT BEFORE YOU GOT MONEY TO BUY MORE.: NEVER TRUE

## 2025-03-21 ASSESSMENT — PATIENT HEALTH QUESTIONNAIRE - PHQ9
1. LITTLE INTEREST OR PLEASURE IN DOING THINGS: NOT AT ALL
2. FEELING DOWN, DEPRESSED OR HOPELESS: NOT AT ALL
SUM OF ALL RESPONSES TO PHQ QUESTIONS 1-9: 0

## 2025-03-21 NOTE — PROGRESS NOTES
Chief Complaint   Patient presents with    Knee Pain     Patient c/o right knee pain. He states he has had knee pain in the past that had been treated with Tramadol and injections. He states he noticed increased pain and swelling on and off x 3 weeks.          \"Have you been to the ER, urgent care clinic since your last visit?  Hospitalized since your last visit?\"    NO    “Have you seen or consulted any other health care providers outside our system since your last visit?”    NO      “Have you had a diabetic eye exam?”    NO     Date of last diabetic eye exam: 11/7/2023

## 2025-03-21 NOTE — ASSESSMENT & PLAN NOTE
Chronic right knee pain. Prior orthopedic care with injections. Worsening, with swelling and instability  -  Voltaren gel 4 times daily.  - Tramadol as needed, up to 4 times daily.  - X-ray and MRI of the right knee ordered.  - Referral to an orthopedic specialist for further evaluation and potential surgical intervention.

## 2025-03-21 NOTE — PROGRESS NOTES
Yovanny Coelho Jr. (: 1955) is a 69 y.o. male, established patient, here for:    ASSESSMENT/PLAN:  Arthritis of right knee  Assessment & Plan:  Chronic right knee pain. Prior orthopedic care with injections. Worsening, with swelling and instability  -  Voltaren gel 4 times daily.  - Tramadol as needed, up to 4 times daily.  - X-ray and MRI of the right knee ordered.  - Referral to an orthopedic specialist for further evaluation and potential surgical intervention.    Orders:  -     XR KNEE RIGHT (3 VIEWS); Future  -     MRI KNEE RIGHT WO CONTRAST; Future  -     Calixto Duncan MD, Orthopedic Surgery(General/Total Joint)[facility] ([facility])  -     diclofenac sodium (VOLTAREN) 1 % GEL; Apply 4 g topically 4 times daily, Topical, 4 TIMES DAILY Starting Fri 3/21/2025, Disp-350 g, R-1, Normal  -     traMADol (ULTRAM) 50 MG tablet; Take 1 tablet by mouth every 6 hours as needed for Pain for up to 30 days. Intended supply: 3 days. Take lowest dose possible to manage pain Max Daily Amount: 200 mg, Disp-60 tablet, R-1Normal  Instability of knee joint, right  -     XR KNEE RIGHT (3 VIEWS); Future  -     MRI KNEE RIGHT WO CONTRAST; Future  -     Calixto Duncan MD, Orthopedic Surgery(General/Total Joint)[facility] ([facility])  Effusion of right knee  -     XR KNEE RIGHT (3 VIEWS); Future  -     MRI KNEE RIGHT WO CONTRAST; Future  -     Calixto Duncan MD, Orthopedic Surgery(General/Total Joint)[facility] ([facility])  Type 2 diabetes mellitus with both eyes affected by proliferative retinopathy and macular edema, with long-term current use of insulin ([facility])  Assessment & Plan:  Over due reassessment. Continue present management with labs today. Scheduling dedicated visit   Orders:  -     insulin glargine (LANTUS SOLOSTAR) 100 UNIT/ML injection pen; Inject 52 Units into the skin daily, Disp-5 Adjustable Dose Pre-filled Pen Syringe, R-0Normal  Type 2 diabetes with nephropathy

## 2025-03-31 ENCOUNTER — HOSPITAL ENCOUNTER (OUTPATIENT)
Facility: HOSPITAL | Age: 70
Discharge: HOME | End: 2025-04-03
Attending: FAMILY MEDICINE
Payer: OTHER GOVERNMENT

## 2025-03-31 DIAGNOSIS — M17.11 ARTHRITIS OF RIGHT KNEE: ICD-10-CM

## 2025-03-31 DIAGNOSIS — M25.461 EFFUSION OF RIGHT KNEE: ICD-10-CM

## 2025-03-31 DIAGNOSIS — M25.361 INSTABILITY OF KNEE JOINT, RIGHT: ICD-10-CM

## 2025-03-31 PROCEDURE — 73562 X-RAY EXAM OF KNEE 3: CPT

## 2025-03-31 PROCEDURE — 73721 MRI JNT OF LWR EXTRE W/O DYE: CPT

## 2025-04-01 ENCOUNTER — RESULTS FOLLOW-UP (OUTPATIENT)
Facility: CLINIC | Age: 70
End: 2025-04-01

## 2025-04-04 ENCOUNTER — RESULTS FOLLOW-UP (OUTPATIENT)
Facility: CLINIC | Age: 70
End: 2025-04-04

## 2025-04-14 ENCOUNTER — TRANSCRIBE ORDERS (OUTPATIENT)
Facility: HOSPITAL | Age: 70
End: 2025-04-14

## 2025-04-14 DIAGNOSIS — M16.11 PRIMARY OSTEOARTHRITIS OF RIGHT HIP: Primary | ICD-10-CM

## 2025-04-14 DIAGNOSIS — M47.816 LUMBAR SPONDYLOSIS: ICD-10-CM

## 2025-04-15 NOTE — TELEPHONE ENCOUNTER
----- Message from [address] sent at 11/29/2022  1:17 PM EST -----  Subject: Message to Provider    QUESTIONS  Information for Provider? Patient went to the [facility] on 11/29 to see   a vascular Dr and was also referred to a vascular Dr. through Dr. Yeny Copeland. Patient would like Dr. Yeny Copeland to get the reports from both the [place]  and   [facility] regarding the blood clots in his neck. He wants to make sure she   has everyone's input on what is really going on with his health  ---------------------------------------------------------------------------  --------------  [address] [facility]  [phone]; OK to leave message on voicemail  ---------------------------------------------------------------------------  --------------  SCRIPT ANSWERS  Relationship to Patient?  Self weight-bearing as tolerated

## 2025-04-21 ENCOUNTER — TELEPHONE (OUTPATIENT)
Facility: CLINIC | Age: 70
End: 2025-04-21

## 2025-04-21 NOTE — TELEPHONE ENCOUNTER
Pt called to speak to Edelmira, informed pt that Edelmira was preoccupied at the moment and that a message could be submitted requesting a call back. Pt would like to speak to her in regards to his Urologist. Please review and advise as needed.

## 2025-04-21 NOTE — TELEPHONE ENCOUNTER
Called patient no answer left him a message letting him know I was returning his call and asked that he call me back at his convenience.

## 2025-04-23 ENCOUNTER — HOSPITAL ENCOUNTER (OUTPATIENT)
Facility: HOSPITAL | Age: 70
Discharge: HOME | End: 2025-04-26
Attending: ORTHOPAEDIC SURGERY
Payer: MEDICARE

## 2025-04-23 DIAGNOSIS — M47.816 LUMBAR SPONDYLOSIS: ICD-10-CM

## 2025-04-23 DIAGNOSIS — M16.11 PRIMARY OSTEOARTHRITIS OF RIGHT HIP: ICD-10-CM

## 2025-04-23 PROCEDURE — 72148 MRI LUMBAR SPINE W/O DYE: CPT

## 2025-04-23 PROCEDURE — 73721 MRI JNT OF LWR EXTRE W/O DYE: CPT

## 2025-05-09 SDOH — HEALTH STABILITY: PHYSICAL HEALTH: ON AVERAGE, HOW MANY DAYS PER WEEK DO YOU ENGAGE IN MODERATE TO STRENUOUS EXERCISE (LIKE A BRISK WALK)?: 1 DAY

## 2025-05-09 SDOH — HEALTH STABILITY: PHYSICAL HEALTH: ON AVERAGE, HOW MANY MINUTES DO YOU ENGAGE IN EXERCISE AT THIS LEVEL?: 10 MIN

## 2025-05-09 ASSESSMENT — PATIENT HEALTH QUESTIONNAIRE - PHQ9
2. FEELING DOWN, DEPRESSED OR HOPELESS: MORE THAN HALF THE DAYS
4. FEELING TIRED OR HAVING LITTLE ENERGY: SEVERAL DAYS
7. TROUBLE CONCENTRATING ON THINGS, SUCH AS READING THE NEWSPAPER OR WATCHING TELEVISION: SEVERAL DAYS
SUM OF ALL RESPONSES TO PHQ QUESTIONS 1-9: 10
5. POOR APPETITE OR OVEREATING: SEVERAL DAYS
SUM OF ALL RESPONSES TO PHQ QUESTIONS 1-9: 10
10. IF YOU CHECKED OFF ANY PROBLEMS, HOW DIFFICULT HAVE THESE PROBLEMS MADE IT FOR YOU TO DO YOUR WORK, TAKE CARE OF THINGS AT HOME, OR GET ALONG WITH OTHER PEOPLE: NOT DIFFICULT AT ALL
SUM OF ALL RESPONSES TO PHQ QUESTIONS 1-9: 10
3. TROUBLE FALLING OR STAYING ASLEEP: MORE THAN HALF THE DAYS
8. MOVING OR SPEAKING SO SLOWLY THAT OTHER PEOPLE COULD HAVE NOTICED. OR THE OPPOSITE, BEING SO FIGETY OR RESTLESS THAT YOU HAVE BEEN MOVING AROUND A LOT MORE THAN USUAL: NOT AT ALL
SUM OF ALL RESPONSES TO PHQ QUESTIONS 1-9: 10
6. FEELING BAD ABOUT YOURSELF - OR THAT YOU ARE A FAILURE OR HAVE LET YOURSELF OR YOUR FAMILY DOWN: SEVERAL DAYS
1. LITTLE INTEREST OR PLEASURE IN DOING THINGS: MORE THAN HALF THE DAYS
9. THOUGHTS THAT YOU WOULD BE BETTER OFF DEAD, OR OF HURTING YOURSELF: NOT AT ALL

## 2025-05-09 ASSESSMENT — LIFESTYLE VARIABLES
HOW OFTEN DO YOU HAVE A DRINK CONTAINING ALCOHOL: NEVER
HOW MANY STANDARD DRINKS CONTAINING ALCOHOL DO YOU HAVE ON A TYPICAL DAY: PATIENT DOES NOT DRINK

## 2025-05-27 DIAGNOSIS — E11.3513 TYPE 2 DIABETES MELLITUS WITH BOTH EYES AFFECTED BY PROLIFERATIVE RETINOPATHY AND MACULAR EDEMA, WITH LONG-TERM CURRENT USE OF INSULIN (HCC): ICD-10-CM

## 2025-05-27 DIAGNOSIS — E11.21 TYPE 2 DIABETES WITH NEPHROPATHY (HCC): ICD-10-CM

## 2025-05-27 DIAGNOSIS — Z79.4 TYPE 2 DIABETES MELLITUS WITH BOTH EYES AFFECTED BY PROLIFERATIVE RETINOPATHY AND MACULAR EDEMA, WITH LONG-TERM CURRENT USE OF INSULIN (HCC): ICD-10-CM

## 2025-05-27 NOTE — TELEPHONE ENCOUNTER
This patient contacted office for the following prescriptions to be filled:    Medication requested :   insulin glargine (LANTUS SOLOSTAR) 100 UNIT/ML injection pen     traMADol (ULTRAM) 50 MG tablet     amLODIPine (NORVASC) 10 MG tablet     losartan (COZAAR) 100 MG tablet     metFORMIN (GLUCOPHAGE) 1000 MG tablet   PCP: [facility]  Pharmacy or Print: Pharmacy   Mail order or Local pharmacy Local ([facility] [address])    Scheduled appointment if not seen by current providers in office: LOV: 3/21/2025 UPCOMING: NONE

## 2025-05-28 ENCOUNTER — TELEPHONE (OUTPATIENT)
Age: 70
End: 2025-05-28

## 2025-05-28 RX ORDER — PREDNISOLONE ACETATE 10 MG/ML
1 SUSPENSION/ DROPS OPHTHALMIC 4 TIMES DAILY
COMMUNITY
Start: 2024-07-26

## 2025-05-28 RX ORDER — INSULIN ASPART 100 [IU]/ML
14 INJECTION, SUSPENSION SUBCUTANEOUS
COMMUNITY

## 2025-05-28 NOTE — TELEPHONE ENCOUNTER
Patient no-showed his follow up on 5/16 and will need to reschedule. Tramadol will not be able to refilled until he is seen however if he needs his other medications I can send to Dr. Norman,

## 2025-05-28 NOTE — TELEPHONE ENCOUNTER
Spoke with patient's sister in regrads to new patient appointment. Informed sister to have patient call the office. Office phone number was given.

## 2025-06-06 ENCOUNTER — TELEPHONE (OUTPATIENT)
Age: 70
End: 2025-06-06

## 2025-06-06 RX ORDER — INSULIN GLARGINE 100 [IU]/ML
52 INJECTION, SOLUTION SUBCUTANEOUS DAILY
Qty: 5 ADJUSTABLE DOSE PRE-FILLED PEN SYRINGE | Refills: 0 | Status: SHIPPED | OUTPATIENT
Start: 2025-06-06

## 2025-06-06 ASSESSMENT — LIFESTYLE VARIABLES
HOW MANY STANDARD DRINKS CONTAINING ALCOHOL DO YOU HAVE ON A TYPICAL DAY: 0
HOW OFTEN DO YOU HAVE SIX OR MORE DRINKS ON ONE OCCASION: 1
HOW OFTEN DO YOU HAVE A DRINK CONTAINING ALCOHOL: 1

## 2025-06-06 NOTE — TELEPHONE ENCOUNTER
Spoke with patient's sister again due to not being able to contact patient for upcoming appointment.

## 2025-06-06 NOTE — TELEPHONE ENCOUNTER
Verified with patient pharmacy the only new prescription he needs sent in is on his Lantus. Patient has been made aware he will need to call [facility] pharmacy to have his refills activated. Prescription pended please review.

## 2025-06-06 NOTE — TELEPHONE ENCOUNTER
Pt has been scheduled for Dr.Stones dawnest available on 7/15. Pt would like to know if all medications outside of tramadol can be sent over to his pharmacy today as he is completely out. Please review and advise as soon as possible.

## 2025-06-09 ENCOUNTER — OFFICE VISIT (OUTPATIENT)
Age: 70
End: 2025-06-09
Payer: OTHER GOVERNMENT

## 2025-06-09 VITALS
DIASTOLIC BLOOD PRESSURE: 86 MMHG | SYSTOLIC BLOOD PRESSURE: 146 MMHG | HEART RATE: 90 BPM | HEIGHT: 76 IN | OXYGEN SATURATION: 96 % | WEIGHT: 269 LBS | TEMPERATURE: 98.3 F | RESPIRATION RATE: 18 BRPM | BODY MASS INDEX: 32.76 KG/M2

## 2025-06-09 DIAGNOSIS — I65.22 STENOSIS OF LEFT CAROTID ARTERY: ICD-10-CM

## 2025-06-09 DIAGNOSIS — I10 ESSENTIAL HYPERTENSION: ICD-10-CM

## 2025-06-09 DIAGNOSIS — G47.33 OSA (OBSTRUCTIVE SLEEP APNEA): Primary | ICD-10-CM

## 2025-06-09 PROCEDURE — 3077F SYST BP >= 140 MM HG: CPT | Performed by: INTERNAL MEDICINE

## 2025-06-09 PROCEDURE — 99204 OFFICE O/P NEW MOD 45 MIN: CPT | Performed by: INTERNAL MEDICINE

## 2025-06-09 PROCEDURE — 1123F ACP DISCUSS/DSCN MKR DOCD: CPT | Performed by: INTERNAL MEDICINE

## 2025-06-09 PROCEDURE — 3079F DIAST BP 80-89 MM HG: CPT | Performed by: INTERNAL MEDICINE

## 2025-06-09 ASSESSMENT — PATIENT HEALTH QUESTIONNAIRE - PHQ9
SUM OF ALL RESPONSES TO PHQ QUESTIONS 1-9: 0
SUM OF ALL RESPONSES TO PHQ QUESTIONS 1-9: 0
2. FEELING DOWN, DEPRESSED OR HOPELESS: NOT AT ALL
SUM OF ALL RESPONSES TO PHQ QUESTIONS 1-9: 0
1. LITTLE INTEREST OR PLEASURE IN DOING THINGS: NOT AT ALL
SUM OF ALL RESPONSES TO PHQ QUESTIONS 1-9: 0

## 2025-06-09 ASSESSMENT — SLEEP AND FATIGUE QUESTIONNAIRES
HOW LIKELY ARE YOU TO NOD OFF OR FALL ASLEEP WHILE WATCHING TV: MODERATE CHANCE OF DOZING
HOW LIKELY ARE YOU TO NOD OFF OR FALL ASLEEP WHILE LYING DOWN TO REST IN THE AFTERNOON WHEN CIRCUMSTANCES PERMIT: HIGH CHANCE OF DOZING
HOW LIKELY ARE YOU TO NOD OFF OR FALL ASLEEP IN A CAR, WHILE STOPPED FOR A FEW MINUTES IN TRAFFIC: WOULD NEVER DOZE
HOW LIKELY ARE YOU TO NOD OFF OR FALL ASLEEP WHILE SITTING AND READING: MODERATE CHANCE OF DOZING
HOW LIKELY ARE YOU TO NOD OFF OR FALL ASLEEP WHILE SITTING AND TALKING TO SOMEONE: SLIGHT CHANCE OF DOZING
HOW LIKELY ARE YOU TO NOD OFF OR FALL ASLEEP WHILE SITTING INACTIVE IN A PUBLIC PLACE: SLIGHT CHANCE OF DOZING
HOW LIKELY ARE YOU TO NOD OFF OR FALL ASLEEP WHEN YOU ARE A PASSENGER IN A CAR FOR AN HOUR WITHOUT A BREAK: MODERATE CHANCE OF DOZING
HOW LIKELY ARE YOU TO NOD OFF OR FALL ASLEEP IN A CAR, WHILE STOPPED FOR A FEW MINUTES IN TRAFFIC: WOULD NEVER DOZE
HOW LIKELY ARE YOU TO NOD OFF OR FALL ASLEEP WHEN YOU ARE A PASSENGER IN A CAR FOR AN HOUR WITHOUT A BREAK: MODERATE CHANCE OF DOZING
HOW LIKELY ARE YOU TO NOD OFF OR FALL ASLEEP WHILE SITTING AND TALKING TO SOMEONE: SLIGHT CHANCE OF DOZING
HOW LIKELY ARE YOU TO NOD OFF OR FALL ASLEEP WHILE SITTING QUIETLY AFTER LUNCH WITHOUT ALCOHOL: HIGH CHANCE OF DOZING
ESS TOTAL SCORE: 14
HOW LIKELY ARE YOU TO NOD OFF OR FALL ASLEEP WHILE WATCHING TV: MODERATE CHANCE OF DOZING
HOW LIKELY ARE YOU TO NOD OFF OR FALL ASLEEP WHILE SITTING INACTIVE IN A PUBLIC PLACE: SLIGHT CHANCE OF DOZING
HOW LIKELY ARE YOU TO NOD OFF OR FALL ASLEEP WHILE SITTING QUIETLY AFTER LUNCH WITHOUT ALCOHOL: HIGH CHANCE OF DOZING
HOW LIKELY ARE YOU TO NOD OFF OR FALL ASLEEP WHILE SITTING AND READING: MODERATE CHANCE OF DOZING
HOW LIKELY ARE YOU TO NOD OFF OR FALL ASLEEP WHILE LYING DOWN TO REST IN THE AFTERNOON WHEN CIRCUMSTANCES PERMIT: HIGH CHANCE OF DOZING

## 2025-06-09 NOTE — PROGRESS NOTES
Yovanny Coelho Jr. presents today for   Chief Complaint   Patient presents with    Sleep Apnea       Is someone accompanying this pt? no    Is the patient using any DME equipment during OV? no    [facility]    Have you ever had a sleep study done before? yes,     Depression Screenin/9/2025     9:24 AM   PHQ-9    Little interest or pleasure in doing things 0   Feeling down, depressed, or hopeless 0   PHQ-2 Score 0   PHQ-9 Total Score 0        Glady Sleepiness Scale:      2025     7:06 AM   Sleep Medicine   Sitting and reading 2   Watching TV 2   Sitting, inactive in a public place (e.g. a theatre or a meeting) 1   As a passenger in a car for an hour without a break 2   Lying down to rest in the afternoon when circumstances permit 3   Sitting and talking to someone 1   Sitting quietly after a lunch without alcohol 3   In a car, while stopped for a few minutes in traffic 0   Glady Sleepiness Score 14    Neck (Inches) 17       Patient-reported       Stop-Ban/9/2025     9:00 AM   STOP-BANG QUESTIONNAIRE   Are you a loud and/or regular snorer? 1   Do you often feel tired or groggy upon awakening or do you awaken with a headache? 1   Have you been observed to gasp or stop breathing during sleep? 1   Are you often tired or fatigued during wake time hours?  0   Do you fall asleep sitting, reading, watching TV or driving? 0   Do you often have problems with memory or concentration? 0   Do you have or are you being treated for high blood pressure? 1   Recent BMI (Calculated) 0   Age older than 50 years old? 1=Yes   Is your neck circumference greater than 17 inches (Male) or 16 inches (Female)? 1   Gender - Male 1=Yes         Coordination of Care:  1. Have you been to the ER, urgent care clinic since your last visit? Hospitalized since your last visit? no    2. Have you seen or consulted any other health care providers outside of the [facility] since your last visit? Include any 
(Inches): 17     Malampati 3  Chin: No retrognathia  General: comfortable, no acute distress  HEENT: pupils reactive, sclera anicteric, EOM intact by inspection  Neck: No visible adenopathy or JVD  CVS: S1S2 no murmurs  RS: Mod AE bilaterally, no tactile fremitus or egophony, no accessory muscle use  Neuro: non focal, awake, alert  Extrm: no leg edema,   Skin: No visible skin rash    Data review:   Prior sleep study: Unknown severity, [facility]    Prior CPAP: 12 with AHI remaining 14, extremely poor compliance    CBC w/Diff No results for input(s): \"WBC\", \"RBC\", \"HGB\", \"HCT\", \"PLT\" in the last 72 hours.    Invalid input(s): \"GRANS\", \"LYMPH\", \"EOS\"        Chemistry No results for input(s): \"GLU\", \"NA\", \"K\", \"CL\", \"CO2\", \"BUN\", \"MG\", \"PHOS\", \"TP\", \"GLOB\" in the last 72 hours.    Invalid input(s): \"CREA\", \"CA\", \"AGAP\", \"BUCR\", \"TBIL\", \"GPT\", \"AP\", \"ALB\", \"AGRAT\"     Thyroid  No results found for: \"T4\", \"T3RU\", \"TSH\"       EKG No results found for this or any previous visit (from the past 4464 hours).       ECHO No results found for this or any previous visit.

## 2025-07-15 ENCOUNTER — OFFICE VISIT (OUTPATIENT)
Facility: CLINIC | Age: 70
End: 2025-07-15
Payer: MEDICARE

## 2025-07-15 VITALS
TEMPERATURE: 98.1 F | OXYGEN SATURATION: 97 % | BODY MASS INDEX: 33.63 KG/M2 | DIASTOLIC BLOOD PRESSURE: 86 MMHG | WEIGHT: 276.2 LBS | HEIGHT: 76 IN | HEART RATE: 83 BPM | SYSTOLIC BLOOD PRESSURE: 128 MMHG

## 2025-07-15 VITALS
TEMPERATURE: 98.2 F | HEIGHT: 76 IN | SYSTOLIC BLOOD PRESSURE: 148 MMHG | WEIGHT: 276 LBS | BODY MASS INDEX: 33.61 KG/M2 | OXYGEN SATURATION: 97 % | HEART RATE: 80 BPM | DIASTOLIC BLOOD PRESSURE: 60 MMHG

## 2025-07-15 DIAGNOSIS — R45.89 DEPRESSED MOOD: ICD-10-CM

## 2025-07-15 DIAGNOSIS — G47.33 OSA ON CPAP: ICD-10-CM

## 2025-07-15 DIAGNOSIS — Z00.00 MEDICARE ANNUAL WELLNESS VISIT, SUBSEQUENT: Primary | ICD-10-CM

## 2025-07-15 DIAGNOSIS — E55.9 VITAMIN D DEFICIENCY: ICD-10-CM

## 2025-07-15 DIAGNOSIS — Z79.4 TYPE 2 DIABETES MELLITUS WITH DIABETIC NEUROPATHY, WITH LONG-TERM CURRENT USE OF INSULIN (HCC): ICD-10-CM

## 2025-07-15 DIAGNOSIS — Z85.46 HISTORY OF PROSTATE CANCER: ICD-10-CM

## 2025-07-15 DIAGNOSIS — E83.42 HYPOMAGNESEMIA: ICD-10-CM

## 2025-07-15 DIAGNOSIS — E11.3513 TYPE 2 DIABETES MELLITUS WITH BOTH EYES AFFECTED BY PROLIFERATIVE RETINOPATHY AND MACULAR EDEMA, WITH LONG-TERM CURRENT USE OF INSULIN (HCC): Primary | Chronic | ICD-10-CM

## 2025-07-15 DIAGNOSIS — Z79.4 TYPE 2 DIABETES MELLITUS WITH BOTH EYES AFFECTED BY PROLIFERATIVE RETINOPATHY AND MACULAR EDEMA, WITH LONG-TERM CURRENT USE OF INSULIN (HCC): Primary | Chronic | ICD-10-CM

## 2025-07-15 DIAGNOSIS — E11.21 TYPE 2 DIABETES WITH NEPHROPATHY (HCC): ICD-10-CM

## 2025-07-15 DIAGNOSIS — E11.40 TYPE 2 DIABETES MELLITUS WITH DIABETIC NEUROPATHY, WITH LONG-TERM CURRENT USE OF INSULIN (HCC): ICD-10-CM

## 2025-07-15 DIAGNOSIS — N52.9 VASCULOGENIC ERECTILE DYSFUNCTION, UNSPECIFIED VASCULOGENIC ERECTILE DYSFUNCTION TYPE: ICD-10-CM

## 2025-07-15 DIAGNOSIS — E78.5 HYPERLIPIDEMIA, UNSPECIFIED HYPERLIPIDEMIA TYPE: ICD-10-CM

## 2025-07-15 DIAGNOSIS — I69.30 HISTORY OF CVA WITH RESIDUAL DEFICIT: ICD-10-CM

## 2025-07-15 PROBLEM — R33.9 URINARY RETENTION: Status: RESOLVED | Noted: 2023-04-11 | Resolved: 2025-07-15

## 2025-07-15 PROCEDURE — 1036F TOBACCO NON-USER: CPT | Performed by: FAMILY MEDICINE

## 2025-07-15 PROCEDURE — 1123F ACP DISCUSS/DSCN MKR DOCD: CPT | Performed by: FAMILY MEDICINE

## 2025-07-15 PROCEDURE — 3046F HEMOGLOBIN A1C LEVEL >9.0%: CPT | Performed by: FAMILY MEDICINE

## 2025-07-15 PROCEDURE — 99215 OFFICE O/P EST HI 40 MIN: CPT | Performed by: FAMILY MEDICINE

## 2025-07-15 PROCEDURE — 3077F SYST BP >= 140 MM HG: CPT | Performed by: FAMILY MEDICINE

## 2025-07-15 PROCEDURE — G8417 CALC BMI ABV UP PARAM F/U: HCPCS | Performed by: FAMILY MEDICINE

## 2025-07-15 PROCEDURE — 3017F COLORECTAL CA SCREEN DOC REV: CPT | Performed by: FAMILY MEDICINE

## 2025-07-15 PROCEDURE — 3074F SYST BP LT 130 MM HG: CPT | Performed by: FAMILY MEDICINE

## 2025-07-15 PROCEDURE — 3078F DIAST BP <80 MM HG: CPT | Performed by: FAMILY MEDICINE

## 2025-07-15 PROCEDURE — G8427 DOCREV CUR MEDS BY ELIG CLIN: HCPCS | Performed by: FAMILY MEDICINE

## 2025-07-15 PROCEDURE — G0439 PPPS, SUBSEQ VISIT: HCPCS | Performed by: FAMILY MEDICINE

## 2025-07-15 PROCEDURE — G2211 COMPLEX E/M VISIT ADD ON: HCPCS | Performed by: FAMILY MEDICINE

## 2025-07-15 PROCEDURE — 2022F DILAT RTA XM EVC RTNOPTHY: CPT | Performed by: FAMILY MEDICINE

## 2025-07-15 PROCEDURE — 3079F DIAST BP 80-89 MM HG: CPT | Performed by: FAMILY MEDICINE

## 2025-07-15 RX ORDER — ROSUVASTATIN CALCIUM 20 MG/1
20 TABLET, COATED ORAL DAILY
Qty: 90 TABLET | Refills: 3 | Status: SHIPPED | OUTPATIENT
Start: 2025-07-15

## 2025-07-15 SDOH — ECONOMIC STABILITY: FOOD INSECURITY: WITHIN THE PAST 12 MONTHS, THE FOOD YOU BOUGHT JUST DIDN'T LAST AND YOU DIDN'T HAVE MONEY TO GET MORE.: NEVER TRUE

## 2025-07-15 SDOH — ECONOMIC STABILITY: FOOD INSECURITY: WITHIN THE PAST 12 MONTHS, YOU WORRIED THAT YOUR FOOD WOULD RUN OUT BEFORE YOU GOT MONEY TO BUY MORE.: NEVER TRUE

## 2025-07-15 ASSESSMENT — PATIENT HEALTH QUESTIONNAIRE - PHQ9
SUM OF ALL RESPONSES TO PHQ QUESTIONS 1-9: 8
7. TROUBLE CONCENTRATING ON THINGS, SUCH AS READING THE NEWSPAPER OR WATCHING TELEVISION: SEVERAL DAYS
SUM OF ALL RESPONSES TO PHQ QUESTIONS 1-9: 8
SUM OF ALL RESPONSES TO PHQ QUESTIONS 1-9: 8
2. FEELING DOWN, DEPRESSED OR HOPELESS: SEVERAL DAYS
8. MOVING OR SPEAKING SO SLOWLY THAT OTHER PEOPLE COULD HAVE NOTICED. OR THE OPPOSITE, BEING SO FIGETY OR RESTLESS THAT YOU HAVE BEEN MOVING AROUND A LOT MORE THAN USUAL: NOT AT ALL
6. FEELING BAD ABOUT YOURSELF - OR THAT YOU ARE A FAILURE OR HAVE LET YOURSELF OR YOUR FAMILY DOWN: NOT AT ALL
4. FEELING TIRED OR HAVING LITTLE ENERGY: SEVERAL DAYS
1. LITTLE INTEREST OR PLEASURE IN DOING THINGS: SEVERAL DAYS
9. THOUGHTS THAT YOU WOULD BE BETTER OFF DEAD, OR OF HURTING YOURSELF: NOT AT ALL
5. POOR APPETITE OR OVEREATING: SEVERAL DAYS
3. TROUBLE FALLING OR STAYING ASLEEP: NEARLY EVERY DAY
10. IF YOU CHECKED OFF ANY PROBLEMS, HOW DIFFICULT HAVE THESE PROBLEMS MADE IT FOR YOU TO DO YOUR WORK, TAKE CARE OF THINGS AT HOME, OR GET ALONG WITH OTHER PEOPLE: SOMEWHAT DIFFICULT
SUM OF ALL RESPONSES TO PHQ QUESTIONS 1-9: 8

## 2025-07-15 NOTE — PROGRESS NOTES
Chief Complaint   Patient presents with    Diabetes    Sleep Apnea    Hypertension    Cholesterol Problem    Vitamin D def     Patient here today to be seen for his routine follow up.      Have you been to the ER, urgent care clinic since your last visit?  Hospitalized since your last visit?   NO    Have you seen or consulted any other health care providers outside our system since your last visit?   YES - When: approximately 4 months ago.  Where and Why: [facility].    “Have you had a diabetic eye exam?”    YES - Where: Feb 2025, [facility] to request most recent records if not in the chart     Date of last diabetic eye exam: 11/7/2023

## 2025-07-15 NOTE — ASSESSMENT & PLAN NOTE
Affirms no plan for self harm. Referring as requested. 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 Currently under investigation

## 2025-07-15 NOTE — ASSESSMENT & PLAN NOTE
Uncontrolled. Requesting referral back to urology. Highlighted relationship between uncontrolled DM and severity of ED.

## 2025-07-15 NOTE — ACP (ADVANCE CARE PLANNING)
Advance Care Planning   General Advance Care Planning (ACP) Conversation    Date of Conversation: 7/15/2025  Conducted with: Patient with Decision Making Capacity  Other persons present: None    Healthcare Decision Maker:    Primary Decision Maker: Josy Lomas - Next of Kin - [phone]      Content/Action Overview:  Has ACP document(s) on file - reflects the patient's care preferences    Length of Voluntary ACP Conversation in minutes:  <16 minutes (Non-Billable)    Carito Norman MD

## 2025-07-15 NOTE — ASSESSMENT & PLAN NOTE
Also with nephropathy, vascular disease and neuropathy. Uncontrolled with A1c 10.3 in March.   - Discontinued Jardiance after approx 30 days due to sluggishness and lethargy.  - Has remained on long and short acting insulins and metformin  - Blood sugar readings at home averaging 135, occasional highs of 155 and lows of 90, inconsistent with A1c. Nocturnal? Validity of FS data?  - Prior hx pancreatitis with exanatide but requesting retrial of semaglutide, which had been effective without side effects. He'd stopped it due to understandable worry over repeat pancreatitis.  - Initiating semaglutide ramp  - Would benefit from CGM and collaborative care with PharmD for more intensive med management - referring  -Continue basal insulin 52 units and mealtime 14 units for  now  -Continue metformin 1000 mg bid   - Update labs  - follow up with me 3 months, labs prior

## 2025-07-15 NOTE — PROGRESS NOTES
Medicare Annual Wellness Visit    Yovanny Coelho Jr. is here for Medicare AWV    Assessment & Plan    Medicare annual wellness visit, subsequent    Results       No follow-ups on file.     Subjective     History of Present Illness      Patient's complete Health Risk Assessment and screening values have been reviewed and are found in Flowsheets. The following problems were reviewed today and where indicated follow up appointments were made and/or referrals ordered.    Positive Risk Factor Screenings with Interventions:    Fall Risk:  Do you feel unsteady or are you worried about falling? : (!) yes  2 or more falls in past year?: no  Fall with injury in past year?: no  Interventions:    Working with orthopedics     Depression:  PHQ-2 Score: 2  PHQ-9 Total Score: 8  Total Score Interpretation: 5-9 = mild depression  Interventions:  Referred to Psychology           Inactivity:  On average, how many days per week do you engage in moderate to strenuous exercise (like a brisk walk)?: 2 days (!) Abnormal  On average, how many minutes do you engage in exercise at this level?: 20 min  Interventions:  See AVS for additional education material     Abnormal BMI (obese):  Body mass index is 33.62 kg/m². (!) Abnormal  Interventions:  Patient advised to follow-up in this office for further evaluation and treatment        Dentist Screen:  Have you seen the dentist within the past year?: (!) No    Intervention:  Advised to schedule with their dentist         Advanced Directives:  Do you have a Living Will?: (!) No    Intervention:  see ACP note                     Objective   Vitals:    07/15/25 1615   BP: 128/86   BP Site: Left Upper Arm   Patient Position: Sitting   Pulse: 83   Temp: 98.1 °F (36.7 °C)   TempSrc: Tympanic   SpO2: 97%   Weight: 125.3 kg (276 lb 3.2 oz)   Height: 1.93 m (6' 4\")      Body mass index is 33.62 kg/m².      Physical Exam                Allergies   Allergen Reactions    Exenatide Other (See Comments)

## 2025-07-15 NOTE — ASSESSMENT & PLAN NOTE
Uncontrolled. Very high risk for CV event. Didn't try rosuvastatin, overwhelmed  - reassured may add to either am or pm meds  - rosuvastatin 20 mg daily

## 2025-07-15 NOTE — PROGRESS NOTES
Chief Complaint   Patient presents with    Medicare AWV     Have you been to the ER, urgent care clinic since your last visit?  Hospitalized since your last visit?   NO    Have you seen or consulted any other health care providers outside our system since your last visit?   NO    “Have you had a diabetic eye exam?”    YES - Where: Feb 2025 [facility] to request most recent records if not in the chart     Date of last diabetic eye exam: 11/7/2023

## 2025-07-15 NOTE — PROGRESS NOTES
Yovanny Coelho Jr. (: 1955) is a 69 y.o. male, established patient, here for:    ASSESSMENT/PLAN:  Type 2 diabetes mellitus with both eyes affected by proliferative retinopathy and macular edema, with long-term current use of insulin ([facility])  Assessment & Plan:  Also with nephropathy, vascular disease and neuropathy. Uncontrolled with A1c 10.3 in March.   - Discontinued Jardiance after approx 30 days due to sluggishness and lethargy.  - Has remained on long and short acting insulins and metformin  - Blood sugar readings at home averaging 135, occasional highs of 155 and lows of 90, inconsistent with A1c. Nocturnal? Validity of FS data?  - Prior hx pancreatitis with exanatide but requesting retrial of semaglutide, which had been effective without side effects. He'd stopped it due to understandable worry over repeat pancreatitis.  - Initiating semaglutide ramp  - Would benefit from CGM and collaborative care with PharmD for more intensive med management - referring  -Continue basal insulin 52 units and mealtime 14 units for  now  -Continue metformin 1000 mg bid   - Update labs  - follow up with me 3 months, labs prior  Orders:  -     Semaglutide,0.25 or 0.5MG/DOS, 2 MG/3ML SOPN; Inject 0.25 mg into the skin every 7 days for 28 days, THEN 0.5 mg every 7 days for 28 days., Disp-6 mL, R-1Normal  -     Semaglutide, 1 MG/DOSE, (OZEMPIC) 4 MG/3ML SOPN sc injection; Inject 1 mg into the skin every 7 days, Disp-6 mL, R-1Normal  -     [facility] Referral to Office-Based Clinical Pharmacy - Jose Leonard ([facility])  -     Comprehensive Metabolic Panel; Future  -     Albumin/Creatinine Ratio, Urine; Future  -     Lipid Panel; Future  -     Hemoglobin A1C; Future  -     rosuvastatin (CRESTOR) 20 MG tablet; Take 1 tablet by mouth daily, Disp-90 tablet, R-3Normal  Type 2 diabetes mellitus with diabetic neuropathy, with long-term current use of insulin ([facility])  Assessment & Plan:  See DM with retinopathy   Orders:  -

## 2025-07-16 ENCOUNTER — TELEPHONE (OUTPATIENT)
Facility: CLINIC | Age: 70
End: 2025-07-16

## 2025-07-16 NOTE — TELEPHONE ENCOUNTER
**[facility]**    Attempt made to contact patient at the home number.    Spoke to patient at home number and advised them of the previous message.    Patient verified understanding and scheduled a in person appointment .  Appointment scheduled for 8/15/25 at 10:30 am.    Oksana Decker [facility]   Ambulatory Pharmacy Technician Clinical   [phone]  Department, toll free: [phone], option 2    For Pharmacy Admin Tracking Only    Program: Medical Group  Recommendation Provided To: Patient/Caregiver: 1 via Telephone  Intervention Detail: Scheduled Appointment  Intervention Accepted By: Patient/Caregiver: 1  Gap Closed?: Yes   Time Spent (min): 10

## 2025-07-16 NOTE — TELEPHONE ENCOUNTER
Reason for referral: DM  Referring provider: Dr Norman  Referring provider office: [facility]   Referred to: Jose Leonard, PharmD, BCACP, BC-ADM  Status of Patient: New Patient  Length of Appt: 60 minutes  Type of Appt: In Person   Patient need address: [address] [place], [place] [id number]

## 2025-07-22 ENCOUNTER — HOSPITAL ENCOUNTER (OUTPATIENT)
Dept: SLEEP MEDICINE | Facility: HOSPITAL | Age: 70
Discharge: HOME | End: 2025-07-25
Attending: INTERNAL MEDICINE
Payer: MEDICARE

## 2025-07-22 DIAGNOSIS — G47.33 OSA (OBSTRUCTIVE SLEEP APNEA): ICD-10-CM

## 2025-07-22 DIAGNOSIS — I10 ESSENTIAL HYPERTENSION: ICD-10-CM

## 2025-07-22 DIAGNOSIS — I65.22 STENOSIS OF LEFT CAROTID ARTERY: ICD-10-CM

## 2025-07-22 PROCEDURE — 95800 SLP STDY UNATTENDED: CPT

## 2025-08-11 PROBLEM — G47.33 OSA (OBSTRUCTIVE SLEEP APNEA): Status: ACTIVE | Noted: 2018-05-11

## 2025-08-21 ENCOUNTER — OFFICE VISIT (OUTPATIENT)
Age: 70
End: 2025-08-21
Payer: MEDICARE

## 2025-08-21 VITALS
WEIGHT: 274 LBS | HEART RATE: 83 BPM | HEIGHT: 76 IN | BODY MASS INDEX: 33.36 KG/M2 | SYSTOLIC BLOOD PRESSURE: 129 MMHG | OXYGEN SATURATION: 98 % | DIASTOLIC BLOOD PRESSURE: 69 MMHG

## 2025-08-21 DIAGNOSIS — G47.33 OSA (OBSTRUCTIVE SLEEP APNEA): Primary | ICD-10-CM

## 2025-08-21 DIAGNOSIS — I10 ESSENTIAL HYPERTENSION: ICD-10-CM

## 2025-08-21 PROCEDURE — 1123F ACP DISCUSS/DSCN MKR DOCD: CPT | Performed by: INTERNAL MEDICINE

## 2025-08-21 PROCEDURE — G8417 CALC BMI ABV UP PARAM F/U: HCPCS | Performed by: INTERNAL MEDICINE

## 2025-08-21 PROCEDURE — 99214 OFFICE O/P EST MOD 30 MIN: CPT | Performed by: INTERNAL MEDICINE

## 2025-08-21 PROCEDURE — 3074F SYST BP LT 130 MM HG: CPT | Performed by: INTERNAL MEDICINE

## 2025-08-21 PROCEDURE — 1036F TOBACCO NON-USER: CPT | Performed by: INTERNAL MEDICINE

## 2025-08-21 PROCEDURE — 3078F DIAST BP <80 MM HG: CPT | Performed by: INTERNAL MEDICINE

## 2025-08-21 PROCEDURE — 3017F COLORECTAL CA SCREEN DOC REV: CPT | Performed by: INTERNAL MEDICINE

## 2025-08-21 PROCEDURE — G8427 DOCREV CUR MEDS BY ELIG CLIN: HCPCS | Performed by: INTERNAL MEDICINE

## 2025-08-21 ASSESSMENT — PATIENT HEALTH QUESTIONNAIRE - PHQ9
SUM OF ALL RESPONSES TO PHQ QUESTIONS 1-9: 0
1. LITTLE INTEREST OR PLEASURE IN DOING THINGS: NOT AT ALL
2. FEELING DOWN, DEPRESSED OR HOPELESS: NOT AT ALL
SUM OF ALL RESPONSES TO PHQ QUESTIONS 1-9: 0

## 2025-08-21 ASSESSMENT — SLEEP AND FATIGUE QUESTIONNAIRES
HOW LIKELY ARE YOU TO NOD OFF OR FALL ASLEEP WHILE WATCHING TV: WOULD NEVER DOZE
HOW LIKELY ARE YOU TO NOD OFF OR FALL ASLEEP WHILE SITTING AND READING: WOULD NEVER DOZE
ESS TOTAL SCORE: 1
HOW LIKELY ARE YOU TO NOD OFF OR FALL ASLEEP IN A CAR, WHILE STOPPED FOR A FEW MINUTES IN TRAFFIC: WOULD NEVER DOZE
HOW LIKELY ARE YOU TO NOD OFF OR FALL ASLEEP WHILE SITTING AND TALKING TO SOMEONE: WOULD NEVER DOZE
HOW LIKELY ARE YOU TO NOD OFF OR FALL ASLEEP WHEN YOU ARE A PASSENGER IN A CAR FOR AN HOUR WITHOUT A BREAK: WOULD NEVER DOZE
HOW LIKELY ARE YOU TO NOD OFF OR FALL ASLEEP WHILE SITTING QUIETLY AFTER LUNCH WITHOUT ALCOHOL: WOULD NEVER DOZE
HOW LIKELY ARE YOU TO NOD OFF OR FALL ASLEEP WHILE SITTING INACTIVE IN A PUBLIC PLACE: WOULD NEVER DOZE
HOW LIKELY ARE YOU TO NOD OFF OR FALL ASLEEP WHILE LYING DOWN TO REST IN THE AFTERNOON WHEN CIRCUMSTANCES PERMIT: SLIGHT CHANCE OF DOZING

## 2025-08-22 ENCOUNTER — CLINICAL DOCUMENTATION (OUTPATIENT)
Age: 70
End: 2025-08-22